# Patient Record
Sex: FEMALE | Race: WHITE | NOT HISPANIC OR LATINO | Employment: FULL TIME | URBAN - METROPOLITAN AREA
[De-identification: names, ages, dates, MRNs, and addresses within clinical notes are randomized per-mention and may not be internally consistent; named-entity substitution may affect disease eponyms.]

---

## 2017-01-17 ENCOUNTER — OFFICE VISIT (OUTPATIENT)
Dept: INTERNAL MEDICINE | Facility: CLINIC | Age: 52
End: 2017-01-17
Payer: COMMERCIAL

## 2017-01-17 ENCOUNTER — HOSPITAL ENCOUNTER (OUTPATIENT)
Dept: RADIOLOGY | Facility: HOSPITAL | Age: 52
Discharge: HOME | End: 2017-01-17
Attending: INTERNAL MEDICINE
Payer: COMMERCIAL

## 2017-01-17 ENCOUNTER — HOSPITAL ENCOUNTER (OUTPATIENT)
Dept: CARDIOLOGY | Facility: HOSPITAL | Age: 52
Discharge: HOME | End: 2017-01-17
Attending: INTERNAL MEDICINE
Payer: COMMERCIAL

## 2017-01-17 VITALS
BODY MASS INDEX: 22.76 KG/M2 | TEMPERATURE: 98 F | OXYGEN SATURATION: 97 % | DIASTOLIC BLOOD PRESSURE: 77 MMHG | WEIGHT: 123.69 LBS | HEART RATE: 79 BPM | SYSTOLIC BLOOD PRESSURE: 119 MMHG | HEIGHT: 62 IN

## 2017-01-17 DIAGNOSIS — Z72.0 TOBACCO ABUSE: ICD-10-CM

## 2017-01-17 DIAGNOSIS — G25.81 RESTLESS LEG SYNDROME: Primary | ICD-10-CM

## 2017-01-17 DIAGNOSIS — G47.00 INSOMNIA, UNSPECIFIED TYPE: ICD-10-CM

## 2017-01-17 DIAGNOSIS — Z00.00 ANNUAL PHYSICAL EXAM: ICD-10-CM

## 2017-01-17 DIAGNOSIS — R53.83 FATIGUE, UNSPECIFIED TYPE: ICD-10-CM

## 2017-01-17 PROCEDURE — 99396 PREV VISIT EST AGE 40-64: CPT | Mod: 25,S$GLB,, | Performed by: INTERNAL MEDICINE

## 2017-01-17 PROCEDURE — 99999 PR PBB SHADOW E&M-EST. PATIENT-LVL III: CPT | Mod: PBBFAC,,, | Performed by: INTERNAL MEDICINE

## 2017-01-17 PROCEDURE — 93005 ELECTROCARDIOGRAM TRACING: CPT

## 2017-01-17 PROCEDURE — 90686 IIV4 VACC NO PRSV 0.5 ML IM: CPT | Mod: S$GLB,,, | Performed by: INTERNAL MEDICINE

## 2017-01-17 PROCEDURE — 90471 IMMUNIZATION ADMIN: CPT | Mod: S$GLB,,, | Performed by: INTERNAL MEDICINE

## 2017-01-17 PROCEDURE — 71020 XR CHEST PA AND LATERAL: CPT | Mod: 26,,, | Performed by: RADIOLOGY

## 2017-01-17 PROCEDURE — 71020 XR CHEST PA AND LATERAL: CPT | Mod: TC

## 2017-01-17 PROCEDURE — 93010 ELECTROCARDIOGRAM REPORT: CPT | Mod: ,,, | Performed by: INTERNAL MEDICINE

## 2017-01-17 RX ORDER — FERROUS SULFATE 325(65) MG
325 TABLET, DELAYED RELEASE (ENTERIC COATED) ORAL
Refills: 0 | COMMUNITY
Start: 2017-01-17 | End: 2017-02-20

## 2017-01-17 RX ORDER — GABAPENTIN 300 MG/1
300 CAPSULE ORAL NIGHTLY
Qty: 30 CAPSULE | Refills: 2 | Status: SHIPPED | OUTPATIENT
Start: 2017-01-17 | End: 2017-05-10 | Stop reason: SDUPTHER

## 2017-01-17 NOTE — MR AVS SNAPSHOT
[facility] Internal Medicine  [address]  [place] [address]  Phone: [phone]  Fax: [phone]                  Carmen Gray   2017 10:20 AM   Office Visit    Description:  Female : 1965   Provider:  Carmen Phan MD   Department:  [facility] Internal Medicine           Reason for Visit     Annual Exam           Diagnoses this Visit        Comments    Restless leg syndrome    -  Primary     Annual physical exam         Insomnia, unspecified type         Fatigue, unspecified type         Tobacco abuse                To Do List           Future Appointments        Provider Department Dept Phone    2017 11:30 AM [facility] XR1 [facility] [phone]    2017 11:45 AM EKG, [facility] [phone]    2017 7:50 AM APPOINTMENT LAB, [facility] [phone]    2017 8:30 AM [facility] MAMMO1 [facility] [phone]      Goals (5 Years of Data)     None      Follow-Up and Disposition     Return in about 2 months (around 3/17/2017).       These Medications        Disp Refills Start End    gabapentin (NEURONTIN) 300 MG capsule 30 capsule 2 2017    Take 1 capsule (300 mg total) by mouth every evening. - Oral    Pharmacy: [facility] [address] [place] [address] [place] [address] [address] AT [facility] [place] Ph #: [phone]         PURCHASE these Medications (No prescription required)        Start End    ferrous sulfate 325 (65 FE) MG EC tablet 2017     Sig - Route: Take 1 tablet (325 mg total) by mouth 3 (three) times daily with meals. - Oral    Class: OTC      [facility]     [facility] Nurse Care Line -  Assistance  Registered nurses in the [facility] provide clinical advisement, health education, appointment booking, and other advisory services.  Call for this free service at [phone].             Medications     "       Message regarding Medications     Verify the changes and/or additions to your medication regime listed below are the same as discussed with your clinician today.  If any of these changes or additions are incorrect, please notify your healthcare provider.        START taking these NEW medications        Refills    gabapentin (NEURONTIN) 300 MG capsule 2    Sig: Take 1 capsule (300 mg total) by mouth every evening.    Class: Normal    Route: Oral    ferrous sulfate 325 (65 FE) MG EC tablet 0    Sig: Take 1 tablet (325 mg total) by mouth 3 (three) times daily with meals.    Class: OTC    Route: Oral      STOP taking these medications     venlafaxine (EFFEXOR-XR) 37.5 MG 24 hr capsule Take 1 capsule (37.5 mg total) by mouth once daily.           Verify that the below list of medications is an accurate representation of the medications you are currently taking.  If none reported, the list may be blank. If incorrect, please contact your healthcare provider. Carry this list with you in case of emergency.           Current Medications     ferrous sulfate 325 (65 FE) MG EC tablet Take 1 tablet (325 mg total) by mouth 3 (three) times daily with meals.    gabapentin (NEURONTIN) 300 MG capsule Take 1 capsule (300 mg total) by mouth every evening.           Clinical Reference Information           Vital Signs - Last Recorded  Most recent update: 1/17/2017 10:31 AM by Yvonne Ureña LPN    BP Pulse Temp Ht Wt SpO2    119/77 (BP Location: Left arm, Patient Position: Sitting, BP Method: Automatic) 79 98.4 °F (36.9 °C) (Oral) 5' 2" (1.575 m) 56.1 kg (123 lb 10.9 oz) 97%    BMI                22.62 kg/m2          Blood Pressure          Most Recent Value    BP  119/77      Allergies as of 1/17/2017     No Known Allergies      Immunizations Administered on Date of Encounter - 1/17/2017     Name Date Dose VIS Date Route    influenza - Quadrivalent - PF (ADULT)  Incomplete 0.5 mL 8/7/2015 Intramuscular      Orders Placed During " Today's Visit      Normal Orders This Visit    Case request GI: COLONOSCOPY     Influenza - Quadrivalent (3 years & older) (PF)     Future Labs/Procedures Expected by Expires    CBC auto differential  1/17/2017 3/18/2018    Comprehensive metabolic panel  1/17/2017 3/18/2018    Lipid panel  1/17/2017 1/17/2018    Mammo Digital Screening Bilat with CAD  1/17/2017 3/20/2018    T4, free  1/17/2017 3/18/2018    TSH  1/17/2017 3/18/2018    X-Ray Chest PA And Lateral  1/17/2017 1/17/2018    EKG 12-lead  As directed 1/17/2018    Hemoglobin A1c  As directed 1/17/2018      [facility] Sign-Up     Activating your [facility] account is as easy as 1-2-3!     1) Visit [email or web address], select Sign Up Now, enter this activation code and your date of birth, then select Next.  [email or web address]  Expires: 3/3/2017 11:19 AM      2) Create a username and password to use when you visit [facility] in the future and select a security question in case you lose your password and select Next.    3) Enter your e-mail address and click Sign Up!    Additional Information  If you have questions, please e-mail [email or web address] or call [phone] to talk to our [facility] staff. Remember, [facility] is NOT to be used for urgent needs. For medical emergencies, dial 911.         Smoking Cessation     If you would like to quit smoking:   You may be eligible for free services if you are a [place] resident and started smoking cigarettes before September 1, 1988.  Call the [employer]oking [employer]essation [employer] (SCT) toll free at (816) 237-5130 or (740) 650-9801.   Call 9-913-QUIT-NOW if you do not meet the above criteria.

## 2017-01-17 NOTE — PROGRESS NOTES
"Portions of this note are generated with voice recognition software. Typographical errors may exist.     Subjective:      Patient ID: Carmen Gray is a 51 y.o. female.    Chief Complaint: Annual Exam    HPI: Patient is a 51-year-old lady here to establish care with me.  She is here for her annual physical examination.  She is just about recovered from her upper respiratory tract illness.  She denies any symptoms of cough or fever or chills at present.  Patient complains of fatigue which has been going on for the past several months.  She also has insomnia.  The insomnia is secondary to restless leg that she has been at night.  The patient says that she has cramping pain and the sensations that are relieved by constant moving movement of her legs.  This disturbs her sleep and also her partner's sleep.  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  Patient is postmenopausal and has had normal Pap smear and is due for her Pap smear in 2 years.  She is status post gastric sleeve surgery for obesity and she's lost about 60 pounds over 2 years.  She denies any history of nausea vomiting or diarrhea.  She has to take multiple small meals 0.2 early satiety.  She is on multivitamin supplements but she is not regular with the intake.  She is status post cholecystectomy and hiatal hernia repair in June 2016 at [facility].  She is a smoker and smokes about half a pack of cigarettes per day for the past 20+ years.  She expresses her intention to quit smoking.  She is anxious about malignancy.  She has occasional alcohol intake.  Patient claims that her mood is mostly ".  She has had depression in the past treated with Effexor at the time of a difficult divorce.  She has no longer taking the medication and denies any complaints of low mood.  She has adequate interest in her day-to-day activities.  She has normal appetite.  Family history is significant for " coronary artery disease in her father who  at the age of 56 from an MI.  History of coronary artery disease in a brother at the age of 65, renal cancer in her brother.    Immunization : She is received DTaP in the last 2 years.  She intends to receive a flu vaccination today.  Screening Tests: Last mammogram was about 5 years back.  She has never had a colonoscopy.      Eye/dental she is due for an eye checkup.  She is myopic and uses glasses for the same.:  The patient denies any chest pain or shortness of breath.  No history of hematemesis or melena.  No history of sore throat or swollen glands.  No history of seizures or syncope.  No history of seizure in any extremity or loss of strength in any extremity.  No history of nausea or vomiting.  No history of recent changes in appetite.  The patient has a history of intended weight loss through gastric sleeve sleeve surgery.  No history of vertigo or ear pain.  No history of acute blurring of vision or diplopia.    Review of patient's allergies indicates:  No Known Allergies  Past Medical History   Diagnosis Date    Anxiety     Depression     Insomnia      Past Surgical History   Procedure Laterality Date    Tubal ligation      Gastric sleeve  2015     Family History   Problem Relation Age of Onset    Hypertension Mother     Hypertension Father     Hypertension Brother     Thyroid disease       Social History     Social History    Marital status:      Spouse name: N/A    Number of children: N/A    Years of education: N/A     Occupational History    Not on file.     Social History Main Topics    Smoking status: Current Every Day Smoker    Smokeless tobacco: Never Used    Alcohol use Yes      Comment: social (history of heavy drinking around the time of her divorce)    Drug use: No    Sexual activity: Not on file     Other Topics Concern    Not on file     Social History Narrative         Review of SystemsAs above   Objective:      Physical Exam   Constitutional: She is oriented to person, place, and time. She appears well-developed and well-nourished. No distress.   HENT:   Head: Normocephalic and atraumatic.   Right Ear: External ear normal.   Left Ear: External ear normal.   Nose: Nose normal.   Mouth/Throat: Oropharynx is clear and moist. No oropharyngeal exudate.   Eyes: EOM are normal. Pupils are equal, round, and reactive to light. Right eye exhibits no discharge. Left eye exhibits no discharge.   Neck: Normal range of motion. Neck supple. No JVD present. No tracheal deviation present. No thyromegaly present.   Cardiovascular: Normal rate and regular rhythm.  Exam reveals no gallop and no friction rub.    No murmur heard.  Pulmonary/Chest: Breath sounds normal. No respiratory distress. She has no wheezes. She has no rales.   Abdominal: Soft. Bowel sounds are normal. She exhibits no distension and no mass. There is no tenderness.   Musculoskeletal: She exhibits no edema or deformity.   Neurological: She is alert and oriented to person, place, and time. She has normal reflexes. She displays normal reflexes. No cranial nerve deficit.   Skin: Skin is warm and dry. She is not diaphoretic.     Assessment:       Labs:  No results found for this or any previous visit (from the past 1008 hour(s)).  1. Restless leg syndrome    2. Annual physical exam    3. Insomnia, unspecified type    4. Fatigue, unspecified type    5. Tobacco abuse      Taught her stretching exercises for restless leg syndrome.  Recommended that she do by cycling and regular walking.  Avoidance of aggravating activities.  Avoidance of caffeine, smoking cessation.  Warm baths before sleeping.  Started her on gabapentin 300 mg daily at bedtime to be advanced to 2 tablets daily at bedtime.  Watch for dizziness/drowsiness.  Also start her on ferrous sulfate  Insomnia secondary to restless leg syndrome.  Will address restless 6 syndrome if required we'll start her on clonazepam  0.1 mg next visit.    Fatigue Will check her lab is.  She had mild anemia with previous years and probably secondary to her surgery.  We'll check her CBC CMP TSH to further evaluate.  We'll start her on iron replacement.  Recommended that she take a multivitamin.     tobacco abuse: The patient said she would call the smoking cessation clinic to help quit smoking.  We'll order a chest x-ray  Annual physical examination: Ordered the following labs.  Will get the mammogram, colonoscopy for screening for cancers.  Will administer flu shot  Follow-up in 2 months or earlier if required    Orders Placed This Encounter   Procedures    Mammo Digital Screening Bilat with CAD    X-Ray Chest PA And Lateral    Influenza - Quadrivalent (3 years & older) (PF)    CBC auto differential    Comprehensive metabolic panel    Lipid panel    Hemoglobin A1c    TSH    T4, free    EKG 12-lead     50 minutes spent during this visit of which greater than 50% devoted to face-face counseling and coordination of care regarding diagnosis and management plan

## 2017-01-21 ENCOUNTER — TELEPHONE (OUTPATIENT)
Dept: GASTROENTEROLOGY | Facility: CLINIC | Age: 52
End: 2017-01-21

## 2017-01-21 DIAGNOSIS — Z12.11 SPECIAL SCREENING FOR MALIGNANT NEOPLASMS, COLON: Primary | ICD-10-CM

## 2017-01-21 NOTE — TELEPHONE ENCOUNTER
Colonoscopy Referral    Referring Physician: Dr. Phan  Date: 1/21/2017    Reason for Referral: screening    Family History of:   Colon polyp: No  Relationship/Age of Onset:     Colon cancer: No  Relationship/Age of Onset:     Patient with:   Hemoccults Done: No  Iron deficient: No  On Blood Thinner: No  Valvular heart disease/valve replacement: No  Anemia Present: No  On NSAID: No  Lung disease: No  Kidney disease: No  Hx of polyps: No  Hx of colon cancer: No    Previous colon evalations: No   None  When:   Where:   Pertinent symptoms:     Current Outpatient Prescriptions   Medication Sig Dispense Refill    ferrous sulfate 325 (65 FE) MG EC tablet Take 1 tablet (325 mg total) by mouth 3 (three) times daily with meals.  0    gabapentin (NEURONTIN) 300 MG capsule Take 1 capsule (300 mg total) by mouth every evening. 30 capsule 2     No current facility-administered medications for this visit.        Review of patient's allergies indicates:  No Known Allergies    Patient was scheduled for colonoscopy on 1/31/2017 with Dr. Mar at [facility]. Split dose instructions were reviewed with patient.

## 2017-01-24 ENCOUNTER — TELEPHONE (OUTPATIENT)
Dept: GASTROENTEROLOGY | Facility: CLINIC | Age: 52
End: 2017-01-24

## 2017-01-24 ENCOUNTER — HOSPITAL ENCOUNTER (OUTPATIENT)
Dept: RADIOLOGY | Facility: HOSPITAL | Age: 52
Discharge: HOME | End: 2017-01-24
Attending: INTERNAL MEDICINE
Payer: COMMERCIAL

## 2017-01-24 ENCOUNTER — TELEPHONE (OUTPATIENT)
Dept: FAMILY MEDICINE | Facility: CLINIC | Age: 52
End: 2017-01-24

## 2017-01-24 DIAGNOSIS — Z00.00 ANNUAL PHYSICAL EXAM: ICD-10-CM

## 2017-01-24 PROCEDURE — 77067 SCR MAMMO BI INCL CAD: CPT | Mod: 26,,, | Performed by: RADIOLOGY

## 2017-01-24 PROCEDURE — 77067 SCR MAMMO BI INCL CAD: CPT | Mod: TC

## 2017-01-24 NOTE — TELEPHONE ENCOUNTER
----- Message from Cheyanne Lemus sent at 1/24/2017  9:16 AM CST -----  Contact: Self [phone]  Patient is calling to reschedule her colonoscopy. Please advice

## 2017-01-24 NOTE — TELEPHONE ENCOUNTER
Pt rescheduled Colonoscopy for Tuesday 2/7/17 at [facility] with Dr Mar. Pt will change dates on instructions accordingly.

## 2017-02-06 ENCOUNTER — TELEPHONE (OUTPATIENT)
Dept: GASTROENTEROLOGY | Facility: CLINIC | Age: 52
End: 2017-02-06

## 2017-02-06 NOTE — TELEPHONE ENCOUNTER
----- Message from Tiki Urbano sent at 2/6/2017  8:46 AM CST -----  Contact: [phone]/self  Pt would like to speak with the nurse about upcoming procedure.   Please advise

## 2017-02-07 ENCOUNTER — ANESTHESIA (OUTPATIENT)
Dept: ENDOSCOPY | Facility: HOSPITAL | Age: 52
End: 2017-02-07
Payer: COMMERCIAL

## 2017-02-07 ENCOUNTER — ANESTHESIA EVENT (OUTPATIENT)
Dept: ENDOSCOPY | Facility: HOSPITAL | Age: 52
End: 2017-02-07
Payer: COMMERCIAL

## 2017-02-07 ENCOUNTER — SURGERY (OUTPATIENT)
Age: 52
End: 2017-02-07

## 2017-02-07 ENCOUNTER — HOSPITAL ENCOUNTER (OUTPATIENT)
Facility: HOSPITAL | Age: 52
Discharge: HOME | End: 2017-02-07
Attending: INTERNAL MEDICINE | Admitting: INTERNAL MEDICINE
Payer: COMMERCIAL

## 2017-02-07 DIAGNOSIS — Z12.11 SCREEN FOR COLON CANCER: Primary | ICD-10-CM

## 2017-02-07 PROCEDURE — 45385 COLONOSCOPY W/LESION REMOVAL: CPT | Mod: 33,,, | Performed by: INTERNAL MEDICINE

## 2017-02-07 PROCEDURE — 25000003 PHARM REV CODE 250: Performed by: INTERNAL MEDICINE

## 2017-02-07 PROCEDURE — 27201089 HC SNARE, DISP (ANY): Performed by: INTERNAL MEDICINE

## 2017-02-07 PROCEDURE — 45390 COLONOSCOPY W/RESECTION: CPT | Performed by: INTERNAL MEDICINE

## 2017-02-07 PROCEDURE — 63600175 PHARM REV CODE 636 W HCPCS: Performed by: NURSE ANESTHETIST, CERTIFIED REGISTERED

## 2017-02-07 PROCEDURE — 25000003 PHARM REV CODE 250: Performed by: NURSE ANESTHETIST, CERTIFIED REGISTERED

## 2017-02-07 PROCEDURE — 88305 TISSUE EXAM BY PATHOLOGIST: CPT | Performed by: PATHOLOGY

## 2017-02-07 PROCEDURE — 88341 IMHCHEM/IMCYTCHM EA ADD ANTB: CPT | Performed by: PATHOLOGY

## 2017-02-07 PROCEDURE — 45381 COLONOSCOPY SUBMUCOUS NJX: CPT | Mod: ,,, | Performed by: INTERNAL MEDICINE

## 2017-02-07 PROCEDURE — 27201028 HC NEEDLE, SCLERO: Performed by: INTERNAL MEDICINE

## 2017-02-07 PROCEDURE — 37000008 HC ANESTHESIA 1ST 15 MINUTES: Performed by: INTERNAL MEDICINE

## 2017-02-07 PROCEDURE — 45385 COLONOSCOPY W/LESION REMOVAL: CPT | Performed by: INTERNAL MEDICINE

## 2017-02-07 PROCEDURE — 88305 TISSUE EXAM BY PATHOLOGIST: CPT | Mod: 26,,, | Performed by: PATHOLOGY

## 2017-02-07 PROCEDURE — 37000009 HC ANESTHESIA EA ADD 15 MINS: Performed by: INTERNAL MEDICINE

## 2017-02-07 RX ORDER — LIDOCAINE HCL/PF 100 MG/5ML
SYRINGE (ML) INTRAVENOUS
Status: DISCONTINUED | OUTPATIENT
Start: 2017-02-07 | End: 2017-02-07

## 2017-02-07 RX ORDER — PROPOFOL 10 MG/ML
VIAL (ML) INTRAVENOUS
Status: DISCONTINUED | OUTPATIENT
Start: 2017-02-07 | End: 2017-02-07

## 2017-02-07 RX ORDER — SODIUM CHLORIDE 9 MG/ML
INJECTION, SOLUTION INTRAVENOUS CONTINUOUS
Status: DISCONTINUED | OUTPATIENT
Start: 2017-02-07 | End: 2017-02-07 | Stop reason: HOSPADM

## 2017-02-07 RX ORDER — PROPOFOL 10 MG/ML
VIAL (ML) INTRAVENOUS CONTINUOUS PRN
Status: DISCONTINUED | OUTPATIENT
Start: 2017-02-07 | End: 2017-02-07

## 2017-02-07 RX ADMIN — PROPOFOL 130 MCG/KG/MIN: 10 INJECTION, EMULSION INTRAVENOUS at 11:02

## 2017-02-07 RX ADMIN — PROPOFOL 100 MG: 10 INJECTION, EMULSION INTRAVENOUS at 11:02

## 2017-02-07 RX ADMIN — LIDOCAINE HYDROCHLORIDE 75 MG: 20 INJECTION, SOLUTION INTRAVENOUS at 11:02

## 2017-02-07 RX ADMIN — SODIUM CHLORIDE: 0.9 INJECTION, SOLUTION INTRAVENOUS at 11:02

## 2017-02-07 RX ADMIN — SODIUM CHLORIDE: 0.9 INJECTION, SOLUTION INTRAVENOUS at 09:02

## 2017-02-07 NOTE — ANESTHESIA POSTPROCEDURE EVALUATION
"Anesthesia Post Evaluation    Patient: Carmen Gray    Procedure(s) Performed: Procedure(s) (LRB):  COLONOSCOPY  split (N/A)    Final Anesthesia Type: MAC  Patient location during evaluation: GI PACU  Patient participation: Yes- Able to Participate  Level of consciousness: awake and alert and oriented  Post-procedure vital signs: reviewed and stable  Pain management: adequate  Airway patency: patent  PONV status at discharge: No PONV  Anesthetic complications: no      Cardiovascular status: blood pressure returned to baseline, hemodynamically stable and stable  Respiratory status: room air, unassisted and spontaneous ventilation  Hydration status: euvolemic  Follow-up not needed.        Visit Vitals    /69 (BP Location: Right arm, Patient Position: Lying, BP Method: Automatic)    Pulse (!) 55    Temp 36.8 °C (98.2 °F) (Oral)    Resp 17    Ht 5' 2" (1.575 m)    Wt 55.8 kg (123 lb)    SpO2 100%    Breastfeeding No    BMI 22.5 kg/m2       Pain/Rafal Score: Pain Assessment Performed: Yes (2/7/2017  9:30 AM)  Presence of Pain: denies (2/7/2017  9:30 AM)      "

## 2017-02-07 NOTE — INTERVAL H&P NOTE
The patient has been examined and the H&P has been reviewed:    I concur with the findings and changes have been noted since the H&P was written: NO prior colonoscopy; NO FH colon polyps / cancer; Screening    Anesthesia/Surgery risks, benefits and alternative options discussed and understood by patient/family.          Active Hospital Problems    Diagnosis  POA    Screen for colon cancer [Z12.11]  Not Applicable      Resolved Hospital Problems    Diagnosis Date Resolved POA   No resolved problems to display.

## 2017-02-07 NOTE — IP AVS SNAPSHOT
[facility]  [address]  [place] [address]  Phone: [phone]           Patient Discharge Instructions     Our goal is to set you up for success. This packet includes information on your condition, medications, and your home care. It will help you to care for yourself so you don't get sicker and need to go back to the hospital.     Please ask your nurse if you have any questions.        There are many details to remember when preparing to leave the hospital. Here is what you will need to do:    1. Take your medicine. If you are prescribed medications, review your Medication List in the following pages. You may have new medications to  at the pharmacy and others that you'll need to stop taking. Review the instructions for how and when to take your medications. Talk with your doctor or nurses if you are unsure of what to do.     2. Go to your follow-up appointments. Specific follow-up information is listed in the following pages. Your may be contacted by a transition nurse or clinical provider about future appointments. Be sure we have all of the phone numbers to reach you, if needed. Please contact your provider's office if you are unable to make an appointment.     3. Watch for warning signs. Your doctor or nurse will give you detailed warning signs to watch for and when to call for assistance. These instructions may also include educational information about your condition. If you experience any of warning signs to your health, call your doctor.               ** Verify the list of medication(s) below is accurate and up to date. Carry this with you in case of emergency. If your medications have changed, please notify your healthcare provider.             Medication List      CONTINUE taking these medications        Additional Info                      gabapentin 300 MG capsule   Commonly known as:  NEURONTIN   Quantity:  30 capsule   Refills:  2   Dose:  300 mg    Instructions:  Take  1 capsule (300 mg total) by mouth every evening.     Begin Date    AM    Noon    PM    Bedtime         ASK your doctor about these medications        Additional Info                      ferrous sulfate 325 (65 FE) MG EC tablet   Refills:  0   Dose:  325 mg    Instructions:  Take 1 tablet (325 mg total) by mouth 3 (three) times daily with meals.     Begin Date    AM    Noon    PM    Bedtime                  Please bring to all follow up appointments:    1. A copy of your discharge instructions.  2. All medicines you are currently taking in their original bottles.  3. Identification and insurance card.    Please arrive 15 minutes ahead of scheduled appointment time.    Please call 24 hours in advance if you must reschedule your appointment and/or time.        Follow-up Information     Follow up with Carmen Phan MD.    Specialty:  Internal Medicine    Why:  As needed    Contact information:    [address]  [address]  [place] [address]  [phone]          Follow up with Dallas Mar MD.    Specialty:  Gastroenterology    Why:  Office will call with biopsy / pathology report, As needed    Contact information:    [address]  [address]  [place] [id number] [phone]          Discharge Instructions     Future Orders    Diet general     Questions:    Total calories:      Fat restriction, if any:      Protein restriction, if any:      Na restriction, if any:      Fluid restriction:      Additional restrictions:          Discharge Instructions       Discharge Summary/Instructions for after Colonoscopy with Biopsy/Polypectomy    Carmen Marina  2/7/2017  Dallas Mar MD    Restrictions on Activity:    - Do not drive car or operate machinery until the day after the procedure.  - The following day: return to full activity including work.  - For 3 days: No heavy lifting, straining or running.  - Diet: Eat and drink normally unless instructed otherwise.    Treatment for Common Side  "Effects:  - Mild abdominal pain and bloating or excessive gas: rest, eat lightly and use a heating pad.     Symptoms to watch for and report to your physician:  1. Severe abdominal pain.  2. Fever within 24 hours after a procedure.  3. A large amount of rectal bleeding. (A small amount of blood from the rectum is not serious, especially if hemorrhoids are present.)  4. Because air was put into your colon during the procedure, expelling large amount of air from your rectum is normal.  5. You may not have a bowel movement for 1-3 days because of the colonoscopy prep. This is normal.  6. Do not take any products containing aspirin for 10 days.  7. Go directly to the emergency room if you notice any of the following:     Chills and/or fever over 101   Persistent vomiting   Severe abdominal pain, other than gas cramps   Severe chest pain   Black, tarry stools   Any bleeding - exceeding one tablespoon    If you have any questions or problems, please call your Physician:    Dallas Mar MD      Lab Results: Contact Physician's Office      If a complication or emergency situation arises and you are unable to reach your Physician - GO TO THE EMERGENCY ROOM.          Primary Diagnosis     Your primary diagnosis was:  Screen For Colon Cancer      Admission Information     Date & Time Provider Department CSN    2/7/2017  8:27 AM Dallas Mar MD [facility] [id number]      Care Providers     Provider Role Specialty Primary office phone    Dallas Mar MD Attending Provider Gastroenterology [phone]    Dallas Mar MD Surgeon  Gastroenterology [phone]      Your Vitals Were     BP Pulse Temp Resp Height Weight    129/83 58 98.2 °F (36.8 °C) (Oral) 18 5' 2" (1.575 m) 55.8 kg (123 lb)    SpO2 BMI             100% 22.5 kg/m2         Recent Lab Values        1/5/2015 1/24/2017                        1:55 PM  7:51 AM          A1C 5.4 5.3          " Comment for A1C at  7:51 AM on 1/24/2017:  According to ADA guidelines, hemoglobin A1C <7.0% represents  optimal control in non-pregnant diabetic patients.  Different  metrics may apply to specific populations.   Standards of Medical Care in Diabetes - 2016.  For the purpose of screening for the presence of diabetes:  <5.7%     Consistent with the absence of diabetes  5.7-6.4%  Consistent with increasing risk for diabetes   (prediabetes)  >or=6.5%  Consistent with diabetes  Currently no consensus exists for use of hemoglobin A1C  for diagnosis of diabetes for children.        Pending Labs     Order Current Status    Specimen to Pathology - Surgery Collected (02/07/17 1207)      Allergies as of 2/7/2017     No Known Allergies      [facility]     [facility] Nurse Care Line - 24/7 Assistance  Unless otherwise directed by your provider, please contact [facility], our nurse care line that is available for 24/7 assistance.     Registered nurses in the [facility] provide clinical advisement, health education, appointment booking, and other advisory services.  Call for this free service at [phone].        Advance Directives     An advance directive is a document which, in the event you are no longer able to make decisions for yourself, tells your healthcare team what kind of treatment you do or do not want to receive, or who you would like to make those decisions for you.  If you do not currently have an advance directive, [facility] encourages you to create one.  For more information call:  [phone]), [phone] ([phone]),  or log on to [email or web address].        Smoking Cessation     If you would like to quit smoking:   You may be eligible for free services if you are a [place] resident and started smoking cigarettes before September 1, 1988.  Call the Smoking Cessation Trust (SCT) toll free at (579) 701-1180 or (681) 424-7720.   Call 3-947-QUIT-NOW if you  do not meet the above criteria.            Language Assistance Services     ATTENTION: Language assistance services are available, free of charge. Please call [phone].      ATENCIÓN: Si raz theodore, tiene a chin disposición servicios gratuitos de asistencia lingüística. [place] al [phone].     CHÚ Ý: N?u b?n nói Ti?ng Vi?t, có các d?ch v? h? tr? ngôn ng? mi?n phí dành cho b?n. G?i s? [phone].        [facility] Sign-Up     Activating your [facility] account is as easy as 1-2-3!     1) Visit [email or web address], select Sign Up Now, enter this activation code and your date of birth, then select Next.  [email or web address]  Expires: 3/3/2017 11:19 AM      2) Create a username and password to use when you visit [facility] in the future and select a security question in case you lose your password and select Next.    3) Enter your e-mail address and click Sign Up!    Additional Information  If you have questions, please e-mail [email or web address] or call [phone] to talk to our [facility] staff. Remember, [facility] is NOT to be used for urgent needs. For medical emergencies, dial 911.          [facility] complies with applicable Federal civil rights laws and does not discriminate on the basis of race, color, national origin, age, disability, or sex.

## 2017-02-07 NOTE — H&P (VIEW-ONLY)
"Portions of this note are generated with voice recognition software. Typographical errors may exist.     Subjective:      Patient ID: Carmen Gray is a 51 y.o. female.    Chief Complaint: Annual Exam    HPI: Patient is a 51-year-old lady here to establish care with me.  She is here for her annual physical examination.  She is just about recovered from her upper respiratory tract illness.  She denies any symptoms of cough or fever or chills at present.  Patient complains of fatigue which has been going on for the past several months.  She also has insomnia.  The insomnia is secondary to restless leg that she has been at night.  The patient says that she has cramping pain and the sensations that are relieved by constant moving movement of her legs.  This disturbs her sleep and also her partner's sleep.  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  Patient is postmenopausal and has had normal Pap smear and is due for her Pap smear in 2 years.  She is status post gastric sleeve surgery for obesity and she's lost about 60 pounds over 2 years.  She denies any history of nausea vomiting or diarrhea.  She has to take multiple small meals 0.2 early satiety.  She is on multivitamin supplements but she is not regular with the intake.  She is status post cholecystectomy and hiatal hernia repair in June 2016 at [facility].  She is a smoker and smokes about half a pack of cigarettes per day for the past 20+ years.  She expresses her intention to quit smoking.  She is anxious about malignancy.  She has occasional alcohol intake.  Patient claims that her mood is mostly ".  She has had depression in the past treated with Effexor at the time of a difficult divorce.  She has no longer taking the medication and denies any complaints of low mood.  She has adequate interest in her day-to-day activities.  She has normal appetite.  Family history is significant for " coronary artery disease in her father who  at the age of 56 from an MI.  History of coronary artery disease in a brother at the age of 65, renal cancer in her brother.    Immunization : She is received DTaP in the last 2 years.  She intends to receive a flu vaccination today.  Screening Tests: Last mammogram was about 5 years back.  She has never had a colonoscopy.      Eye/dental she is due for an eye checkup.  She is myopic and uses glasses for the same.:  The patient denies any chest pain or shortness of breath.  No history of hematemesis or melena.  No history of sore throat or swollen glands.  No history of seizures or syncope.  No history of seizure in any extremity or loss of strength in any extremity.  No history of nausea or vomiting.  No history of recent changes in appetite.  The patient has a history of intended weight loss through gastric sleeve sleeve surgery.  No history of vertigo or ear pain.  No history of acute blurring of vision or diplopia.    Review of patient's allergies indicates:  No Known Allergies  Past Medical History   Diagnosis Date    Anxiety     Depression     Insomnia      Past Surgical History   Procedure Laterality Date    Tubal ligation      Gastric sleeve  2015     Family History   Problem Relation Age of Onset    Hypertension Mother     Hypertension Father     Hypertension Brother     Thyroid disease       Social History     Social History    Marital status:      Spouse name: N/A    Number of children: N/A    Years of education: N/A     Occupational History    Not on file.     Social History Main Topics    Smoking status: Current Every Day Smoker    Smokeless tobacco: Never Used    Alcohol use Yes      Comment: social (history of heavy drinking around the time of her divorce)    Drug use: No    Sexual activity: Not on file     Other Topics Concern    Not on file     Social History Narrative         Review of SystemsAs above   Objective:      Physical Exam   Constitutional: She is oriented to person, place, and time. She appears well-developed and well-nourished. No distress.   HENT:   Head: Normocephalic and atraumatic.   Right Ear: External ear normal.   Left Ear: External ear normal.   Nose: Nose normal.   Mouth/Throat: Oropharynx is clear and moist. No oropharyngeal exudate.   Eyes: EOM are normal. Pupils are equal, round, and reactive to light. Right eye exhibits no discharge. Left eye exhibits no discharge.   Neck: Normal range of motion. Neck supple. No JVD present. No tracheal deviation present. No thyromegaly present.   Cardiovascular: Normal rate and regular rhythm.  Exam reveals no gallop and no friction rub.    No murmur heard.  Pulmonary/Chest: Breath sounds normal. No respiratory distress. She has no wheezes. She has no rales.   Abdominal: Soft. Bowel sounds are normal. She exhibits no distension and no mass. There is no tenderness.   Musculoskeletal: She exhibits no edema or deformity.   Neurological: She is alert and oriented to person, place, and time. She has normal reflexes. She displays normal reflexes. No cranial nerve deficit.   Skin: Skin is warm and dry. She is not diaphoretic.     Assessment:       Labs:  No results found for this or any previous visit (from the past 1008 hour(s)).  1. Restless leg syndrome    2. Annual physical exam    3. Insomnia, unspecified type    4. Fatigue, unspecified type    5. Tobacco abuse      Taught her stretching exercises for restless leg syndrome.  Recommended that she do by cycling and regular walking.  Avoidance of aggravating activities.  Avoidance of caffeine, smoking cessation.  Warm baths before sleeping.  Started her on gabapentin 300 mg daily at bedtime to be advanced to 2 tablets daily at bedtime.  Watch for dizziness/drowsiness.  Also start her on ferrous sulfate  Insomnia secondary to restless leg syndrome.  Will address restless 6 syndrome if required we'll start her on clonazepam  0.1 mg next visit.    Fatigue Will check her lab is.  She had mild anemia with previous years and probably secondary to her surgery.  We'll check her CBC CMP TSH to further evaluate.  We'll start her on iron replacement.  Recommended that she take a multivitamin.     tobacco abuse: The patient said she would call the smoking cessation clinic to help quit smoking.  We'll order a chest x-ray  Annual physical examination: Ordered the following labs.  Will get the mammogram, colonoscopy for screening for cancers.  Will administer flu shot  Follow-up in 2 months or earlier if required    Orders Placed This Encounter   Procedures    Mammo Digital Screening Bilat with CAD    X-Ray Chest PA And Lateral    Influenza - Quadrivalent (3 years & older) (PF)    CBC auto differential    Comprehensive metabolic panel    Lipid panel    Hemoglobin A1c    TSH    T4, free    EKG 12-lead     50 minutes spent during this visit of which greater than 50% devoted to face-face counseling and coordination of care regarding diagnosis and management plan

## 2017-02-07 NOTE — ANESTHESIA PREPROCEDURE EVALUATION
02/07/2017  Carmen Gray is a 51 y.o., female.    [facility] Anesthesia Evaluation     I have reviewed the Nursing Notes.   I have reviewed the Medications.     Review of Systems  Anesthesia Hx:  No problems with previous Anesthesia Denies Hx of Anesthetic complications Denies Family Hx of Anesthesia complications.    Social:  Non-Smoker, No Alcohol Use    Hematology/Oncology:  Hematology Normal   Oncology Normal     EENT/Dental:EENT/Dental Normal   Cardiovascular:  Cardiovascular Normal Exercise tolerance: good   Functional Capacity good / => 4 METS    Pulmonary:  Pulmonary Normal    Renal/:  Renal/ Normal     Hepatic/GI:  Hepatic/GI Normal    Musculoskeletal:  Musculoskeletal Normal    Neurological:  Neurology Normal    Endocrine:  Endocrine Normal    Psych:   Psychiatric History          Physical Exam  General:  Well nourished    Airway/Jaw/Neck:  Airway Findings: Mouth Opening: Normal Tongue: Normal  General Airway Assessment: Adult  Mallampati: II  TM Distance: Normal, at least 6 cm  Jaw/Neck Findings:     Neck ROM: Normal ROM      Dental:  Dental Findings: In tact   Chest/Lungs:  Chest/Lungs Findings: Clear to auscultation     Heart/Vascular:  Heart Findings: Rate: Normal  Rhythm: Regular Rhythm        Mental Status:  Mental Status Findings:  Cooperative, Alert and Oriented         Anesthesia Plan  Type of Anesthesia, risks & benefits discussed:  Anesthesia Type:  MAC  Patient's Preference: MAC  Intra-op Monitoring Plan:   Intra-op Monitoring Plan Comments:   Post Op Pain Control Plan:   Post Op Pain Control Plan Comments:   Induction:   IV  Beta Blocker:         Informed Consent: Patient understands risks and agrees with Anesthesia plan.  Questions answered. Anesthesia consent signed with patient.  ASA Score: 2     Day of Surgery Review of History & Physical:            Ready For Surgery From Anesthesia  Perspective.

## 2017-02-07 NOTE — TRANSFER OF CARE
"Anesthesia Transfer of Care Note    Patient: Carmen Gray    Procedure(s) Performed: Procedure(s) (LRB):  COLONOSCOPY  split (N/A)    Patient location: GI    Anesthesia Type: MAC    Transport from OR: Transported from OR on room air with adequate spontaneous ventilation    Post pain: adequate analgesia    Post assessment: no apparent anesthetic complications    Post vital signs: stable    Level of consciousness: awake, alert and oriented    Nausea/Vomiting: no nausea/vomiting    Complications: none          Last vitals:   Visit Vitals    /69 (BP Location: Right arm, Patient Position: Lying, BP Method: Automatic)    Pulse (!) 55    Temp 36.8 °C (98.2 °F) (Oral)    Resp 17    Ht 5' 2" (1.575 m)    Wt 55.8 kg (123 lb)    SpO2 100%    Breastfeeding No    BMI 22.5 kg/m2     "

## 2017-02-09 ENCOUNTER — TELEPHONE (OUTPATIENT)
Dept: FAMILY MEDICINE | Facility: CLINIC | Age: 52
End: 2017-02-09

## 2017-02-09 VITALS
WEIGHT: 123 LBS | RESPIRATION RATE: 12 BRPM | DIASTOLIC BLOOD PRESSURE: 79 MMHG | BODY MASS INDEX: 22.63 KG/M2 | OXYGEN SATURATION: 100 % | HEART RATE: 56 BPM | SYSTOLIC BLOOD PRESSURE: 129 MMHG | HEIGHT: 62 IN | TEMPERATURE: 98 F

## 2017-02-09 NOTE — TELEPHONE ENCOUNTER
----- Message from Cheyanne Lemus sent at 2/9/2017  8:52 AM CST -----  Contact: Self [phone]  Patient Returning Your Phone Call

## 2017-02-15 ENCOUNTER — TELEPHONE (OUTPATIENT)
Dept: FAMILY MEDICINE | Facility: CLINIC | Age: 52
End: 2017-02-15

## 2017-02-15 DIAGNOSIS — C80.1 ADENOCARCINOMA IN A POLYP: Primary | ICD-10-CM

## 2017-02-15 NOTE — TELEPHONE ENCOUNTER
Spoke with patient and confirmed that she received Dr. Phan's message and will stop by the office tomorrow at about 12:30. Lab scheduled and I informed the patient that someone will call her to schedule the CT once authorization is received, patient verbalized understanding.

## 2017-02-15 NOTE — TELEPHONE ENCOUNTER
----- Message from Abby Torres sent at 2/15/2017  1:33 PM CST -----  Contact: self/[phone]  Patient is returning your call.  Please advise

## 2017-02-16 ENCOUNTER — LAB VISIT (OUTPATIENT)
Dept: LAB | Facility: HOSPITAL | Age: 52
End: 2017-02-16
Attending: INTERNAL MEDICINE
Payer: COMMERCIAL

## 2017-02-16 ENCOUNTER — PATIENT MESSAGE (OUTPATIENT)
Dept: INTERNAL MEDICINE | Facility: CLINIC | Age: 52
End: 2017-02-16

## 2017-02-16 DIAGNOSIS — C80.1 ADENOCARCINOMA IN A POLYP: ICD-10-CM

## 2017-02-16 LAB — CEA SERPL-MCNC: 2.4 NG/ML

## 2017-02-16 PROCEDURE — 36415 COLL VENOUS BLD VENIPUNCTURE: CPT

## 2017-02-16 PROCEDURE — 82378 CARCINOEMBRYONIC ANTIGEN: CPT

## 2017-02-17 ENCOUNTER — TELEPHONE (OUTPATIENT)
Dept: FAMILY MEDICINE | Facility: CLINIC | Age: 52
End: 2017-02-17

## 2017-02-20 ENCOUNTER — OFFICE VISIT (OUTPATIENT)
Dept: SURGERY | Facility: CLINIC | Age: 52
End: 2017-02-20
Payer: COMMERCIAL

## 2017-02-20 VITALS
HEART RATE: 54 BPM | DIASTOLIC BLOOD PRESSURE: 73 MMHG | HEIGHT: 62 IN | SYSTOLIC BLOOD PRESSURE: 111 MMHG | WEIGHT: 126.31 LBS | BODY MASS INDEX: 23.24 KG/M2

## 2017-02-20 DIAGNOSIS — C19 CANCER OF RECTOSIGMOID (COLON): Primary | ICD-10-CM

## 2017-02-20 PROCEDURE — 99999 PR PBB SHADOW E&M-EST. PATIENT-LVL III: CPT | Mod: PBBFAC,,, | Performed by: COLON & RECTAL SURGERY

## 2017-02-20 PROCEDURE — 1160F RVW MEDS BY RX/DR IN RCRD: CPT | Mod: S$GLB,,, | Performed by: COLON & RECTAL SURGERY

## 2017-02-20 PROCEDURE — 45330 DIAGNOSTIC SIGMOIDOSCOPY: CPT | Mod: S$GLB,,, | Performed by: COLON & RECTAL SURGERY

## 2017-02-20 PROCEDURE — 99205 OFFICE O/P NEW HI 60 MIN: CPT | Mod: 25,S$GLB,, | Performed by: COLON & RECTAL SURGERY

## 2017-02-20 RX ORDER — CYCLOSPORINE 0.5 MG/ML
1 EMULSION OPHTHALMIC NIGHTLY
COMMUNITY
Start: 2017-02-14

## 2017-02-21 ENCOUNTER — HOSPITAL ENCOUNTER (OUTPATIENT)
Dept: RADIOLOGY | Facility: HOSPITAL | Age: 52
Discharge: HOME | End: 2017-02-21
Attending: INTERNAL MEDICINE
Payer: COMMERCIAL

## 2017-02-21 DIAGNOSIS — C80.1 ADENOCARCINOMA IN A POLYP: ICD-10-CM

## 2017-02-21 PROCEDURE — 71260 CT THORAX DX C+: CPT | Mod: 26,,, | Performed by: RADIOLOGY

## 2017-02-21 PROCEDURE — 71260 CT THORAX DX C+: CPT | Mod: TC

## 2017-02-21 PROCEDURE — 74178 CT ABD&PLV WO CNTR FLWD CNTR: CPT | Mod: 26,,, | Performed by: RADIOLOGY

## 2017-02-21 PROCEDURE — 74178 CT ABD&PLV WO CNTR FLWD CNTR: CPT | Mod: TC

## 2017-02-21 PROCEDURE — 25500020 PHARM REV CODE 255: Performed by: INTERNAL MEDICINE

## 2017-02-21 RX ADMIN — IOHEXOL 30 ML: 350 INJECTION, SOLUTION INTRAVENOUS at 09:02

## 2017-02-21 RX ADMIN — IOHEXOL 75 ML: 350 INJECTION, SOLUTION INTRAVENOUS at 11:02

## 2017-02-21 NOTE — PROGRESS NOTES
Subjective:       Patient ID: Carmen Gray is a 51 y.o. female.    Chief Complaint: Colon Cancer    HPI New pt.  Underwent screening colonoscopy on 2/ 7/2017 - ([facility]rotter); 2 flat polyps in ascending colon, 7 and 12mminf -path tubular adenoma and hyperplastic.  Another sigmoid colon polyp -  Inflammatory polyp - and a polypoid lesion at about 21 cm - path invasive cancer with positive margin.  No lower GI symptoms. CEA = 2.4    Past Medical History   Diagnosis Date    Anxiety     Depression     Insomnia      Past Surgical History   Procedure Laterality Date    Tubal ligation      Gastric sleeve  2015    Cholecystectomy      Hernia repair       hiatal     Colonoscopy N/A 2/7/2017     Procedure: COLONOSCOPY  split;  Surgeon: Dallas Mar MD;  Location: [facility];  Service: Endoscopy;  Laterality: N/A;       Review of Systems   Constitutional: Negative for chills and fever.   Respiratory: Negative for cough and shortness of breath.    Cardiovascular: Negative for chest pain and palpitations.   Genitourinary: Negative for dysuria and urgency.   Neurological: Negative for seizures and numbness.   Psychiatric/Behavioral: Negative for agitation and behavioral problems.       Objective:      Physical Exam   Constitutional: She is oriented to person, place, and time. She appears well-developed and well-nourished.   Eyes: Conjunctivae and EOM are normal.   Pulmonary/Chest: Effort normal. No respiratory distress.   Abdominal: Soft. She exhibits no distension.   Genitourinary:   Genitourinary Comments: Anorectal: normal tone   Musculoskeletal: Normal range of motion. She exhibits no edema.   Neurological: She is alert and oriented to person, place, and time.   Skin: Skin is warm and dry.   Psychiatric: She has a normal mood and affect. Her behavior is normal.     Flexible Sigmoidoscopy Procedure Note    Procedure: Flexible Sigmoidoscopy    Pre-operative Diagnosis: Rectosigmoid colon  cancer    Post-operative Diagnosis: same    Indications: Recent cscope with colon cancer in partially removed polyp    Procedure Details     Informed consent was obtained for the procedure. Risks of perforation and hemorrhage were discussed. The patient was placed in the left lateral decubitus position, the anal region was examined, a rectal performed, then the 60cm flexible sigmoidoscope was inserted and advanced without difficulty to a distance of 30 cm. The prep was excellent. The instrument was withdrawnwith excellent views throughout.    Findings:  Residual mass at 22cm.  Tattoo was placed 5 cm distal in 3 quadrants.     Specimens: none           Complications:  None; patient tolerated the procedure well.           Disposition: PACU - hemodynamically stable.           Condition: stable    Impression:    no biopsies taken, bowel prep was adequate, procedure well tolerated without complications    Recommendations:  Surgery - sigmoid colectomy    Attending Attestation: I performed the procedure.  Assessment:       1. Cancer of rectosigmoid (colon)        Plan:       MSI testing.  CT chest/abd/pelvis

## 2017-02-23 ENCOUNTER — DOCUMENTATION ONLY (OUTPATIENT)
Dept: NEUROLOGY | Facility: HOSPITAL | Age: 52
End: 2017-02-23

## 2017-02-23 DIAGNOSIS — K63.5 POLYP OF COLON, UNSPECIFIED PART OF COLON, UNSPECIFIED TYPE: Primary | ICD-10-CM

## 2017-02-23 NOTE — PATIENT CARE CONFERENCE
General Tumor Board 2/17/17  52 y/o F who initially presented to her PCP for an annual physical exam. Routine workup with MMG, chest XR and labs were ordered. She was referred to Dr. Mar for screening colonoscopy. She underwent colonoscopy on 2/7/17. Multiple polyps were identified in the colon and were resected. Pathology showed invasive, moderately differentiated adenocarcinoma. - Presenter: Robert Mccartney MD / Carmen Phan MD    Recommendations:  1. CT Abdomen/Pelvis  2. PET Scan  3. Refer to surgery

## 2017-02-24 ENCOUNTER — TELEPHONE (OUTPATIENT)
Dept: GENETICS | Facility: CLINIC | Age: 52
End: 2017-02-24

## 2017-02-24 NOTE — TELEPHONE ENCOUNTER
----- Message from Mago Guerra sent at 2/22/2017  1:49 PM CST -----  Needs to be seen for colon cancer per Polo Abel M.D.

## 2017-03-03 ENCOUNTER — TELEPHONE (OUTPATIENT)
Dept: SURGERY | Facility: CLINIC | Age: 52
End: 2017-03-03

## 2017-03-03 NOTE — TELEPHONE ENCOUNTER
----- Message from Candida Burnette sent at 3/3/2017  8:18 AM CST -----  Contact: self  Pt said she need the dr to call her back to discuss a date for surgery ; call her at [phone]

## 2017-03-06 ENCOUNTER — OFFICE VISIT (OUTPATIENT)
Dept: GENETICS | Facility: CLINIC | Age: 52
End: 2017-03-06
Payer: COMMERCIAL

## 2017-03-06 ENCOUNTER — LAB VISIT (OUTPATIENT)
Dept: LAB | Facility: HOSPITAL | Age: 52
End: 2017-03-06
Attending: MEDICAL GENETICS
Payer: COMMERCIAL

## 2017-03-06 VITALS — BODY MASS INDEX: 23.45 KG/M2 | HEIGHT: 62 IN | WEIGHT: 127.44 LBS

## 2017-03-06 DIAGNOSIS — C18.9 MALIGNANT NEOPLASM OF COLON, UNSPECIFIED PART OF COLON: ICD-10-CM

## 2017-03-06 DIAGNOSIS — K63.5 POLYP OF COLON, UNSPECIFIED PART OF COLON, UNSPECIFIED TYPE: ICD-10-CM

## 2017-03-06 DIAGNOSIS — C18.9 MALIGNANT NEOPLASM OF COLON, UNSPECIFIED PART OF COLON: Primary | ICD-10-CM

## 2017-03-06 PROCEDURE — 99999 PR PBB SHADOW E&M-EST. PATIENT-LVL III: CPT | Mod: PBBFAC,,,

## 2017-03-06 PROCEDURE — 36415 COLL VENOUS BLD VENIPUNCTURE: CPT | Mod: PO

## 2017-03-06 PROCEDURE — 96040 PR GENETIC COUNSELING, EACH 30 MIN: CPT | Mod: S$GLB,,, | Performed by: MEDICAL GENETICS

## 2017-03-06 PROCEDURE — 99499 UNLISTED E&M SERVICE: CPT | Mod: S$GLB,,, | Performed by: MEDICAL GENETICS

## 2017-03-06 NOTE — PROGRESS NOTES
Carmen Gray  DOS: 17  : [birth year]  MRN: [id number]      REFERING MD: Polo Abel MD    REASON FOR CONSULT: Our Medical Genetic Service was asked to evaluate this 51-year-old  female for her recent diagnosis of rectosigmoid colon cancer. Ms. Gray presents to this appointment accompanied by her fiancée Ad.     HISTORY OF PRESENT ILLNESS: On 17, Ms. Gray had her first screening colonoscopy which identified 4 colon polyps. One 15 mm polyp was found to be moderately differentiated invasive adenocarcinoma. Microsatellite instability (MSI) was recommended and is pending.    Ms. Gray reported menarche was at age 10. She has about a 30 year history of OCP use. Her son was born when she was 26. She entered menopause in her 40s and has no history of HRT use. Ms. Gray had her last mammogram on 17 which was normal. She has no history of breast biopsies. Ms. Gray is a smoker.       FAMILY HISTORY: At this visit, a 3 generation pedigree was constructed and will be scanned in the patients chart.  Ms. Gray has a healthy 25-year-old son. The patients 64-year-old brother recently had a kidney removed due to a tumor (type unknown). The remainder of the family was noncontributory. Maternal ancestry is [place] and [place].  Paternal ancestry is Norwegian and [place].  Ashkenazi Alevism ancestry was denied.     IMPRESSION: We reviewed the patients medical and family history and discussed the genetics of cancer, cancer risks associated with a hereditary predisposition to cancer, and the benefits, risks, and limitations of genetic testing. We reviewed that MSI/IHC is a screening test for Cherry syndrome. Abnormal tumor testing (MSI-H) is seen in more than 90% of Cherry associated tumors.     Cherry syndrome, also known as hereditary non polyposis colorectal cancer (HNPCC) is an autosomal dominant disorder which predisposes the patient to cancer in the variety of organs. Cherry associated cancers  include colon (general population 5.5% vs 80% in HNPCC), endometrium (2.7% versus 20-60%), stomach (below 1% versus 11-19%), ovary (1.6% versus 9-12%), hepatobiliary (below 1% versus 2-7%), urinary tract (below 1% versus 4-5%), small bowel (below 1% versus 1-4%) and brain (below 1% versus 1-3%). Cherry syndrome is caused by germline mutations in mismatch repair genes (MLH1, MSH2, MSH6, PMS2, EPCAM).    We also discussed that while the family history is not suggestive of Cherry syndrome, germline testing would be helpful to determine cancer treatment and future screening/ prevention protocol.  [facility] offers a 7 gene Cherry/Colorectal High Risk Panel (APC, EPCAM, MLH1, MSH2, MSH6, MUTYH, and PMS2). We reviewed the various risk estimations and emphasized that there is variable expressivity and incomplete penetrance.  We also reviewed that having a mutation in a gene does not mean that the individual will develop the associated types of cancer. It does, however, mean that the chances of developing cancers are significantly increased.  A negative result certainly does not rule out a hereditary predisposition to cancer. We discussed the risk of findings a variant of uncertain significance as well as the potential insurance implications, limitations and provisions of MARJORIE. If a mutation is found in one these genes, we can offer targeted mutation testing to the patients son/ extended family. Ms. Gray expressed understanding and had all her questions answered to her satisfaction.     RECOMMENDATIONS:   1. Cherry/Colorectal High Risk Panel  7 gene Panel     2. If positive, advise the patient for appropriate healthcare management.  3. If positive, offer testing to appropriate relatives.   4. Follow-up when results are complete.      REFERENCE:  Keshav W, Erum SB. Cherry Syndrome. 2004 Feb 5 [Updated 2012 Sep 20]. In: Flower RA, Abram TD, Mango CR, et al., editors. GeneRAppTapws [Internet]. [place] ([place]): [facility]  [facility]; 1993-. Available from: http://www.ncbi.nlm.nih.gov/books/JOU3515/  Time spent: 60 minutes, more than 50% was spent in counseling. The note is in epic.     SARA Andino., BETO Monsalve, LAURA Brooks, SARA Palma, RELL Christian., Holter, SFauzia, ... & KEISHA Samson. (2012). Identification of individuals at risk for Cherry syndrome using targeted evaluations and genetic testing: National Society of Genetic Counselors and the Collaborative Group of the Americas on Inherited Colorectal Cancer joint practice guideline. Journal of genetic counseling, 21(4), 484-493.      Abraham Ramsay M.D.                                                               Mago Guerra MS  Section Head - Medical Genetics                                                  Genetic Counselor           [facility]                                                                                       [email or web address]

## 2017-03-06 NOTE — LETTER
March 6, 2017      Polo Abel MD  [address]  [facility] [address]           [facility] - Genetics  [address]  [facility] [address]  Phone: [phone]          Patient: Carmen Gray   MR Number: [id number]   YOB: 1965   Date of Visit: 3/6/2017       Dear Dr. Polo Abel:    Thank you for referring Carmen Gray to me for evaluation. Attached you will find relevant portions of my assessment and plan of care.    If you have questions, please do not hesitate to call me. I look forward to following Carmen Gray along with you.    Sincerely,    Mago Guerra    Enclosure  CC:  No Recipients    If you would like to receive this communication electronically, please contact [email or web address] or [phone] to request more information on appweevr Link access.    For providers and/or their staff who would like to refer a patient to [facility], please contact us through our one-stop-shop provider referral line, Elsa Yost, at [phone].    If you feel you have received this communication in error or would no longer like to receive these types of communications, please e-mail [email or web address]

## 2017-03-06 NOTE — MR AVS SNAPSHOT
[facility] - Genetics  [address]  [place] [address]  Phone: [phone]                  Carmen Gray   3/6/2017 8:00 AM   Appointment    Description:  Female : 1965   Provider:  Mago Guerra   Department:  [facility] - Genetics                To Do List           Future Appointments        Provider Department Dept Phone    3/10/2017 10:45 AM Polo Abel MD [facility]-Colon and Rectal Surg [phone]      Your Future Surgeries/Procedures     Mar 28, 2017   Surgery with Polo Abel MD   [facility])    [address] [address] [phone]              Goals (5 Years of Data)     None      [facility]     [facility] Nurse Care Line -  Assistance  Registered nurses in the [facility] provide clinical advisement, health education, appointment booking, and other advisory services.  Call for this free service at [phone].             Medications           Message regarding Medications     Verify the changes and/or additions to your medication regime listed below are the same as discussed with your clinician today.  If any of these changes or additions are incorrect, please notify your healthcare provider.             Verify that the below list of medications is an accurate representation of the medications you are currently taking.  If none reported, the list may be blank. If incorrect, please contact your healthcare provider. Carry this list with you in case of emergency.           Current Medications     gabapentin (NEURONTIN) 300 MG capsule Take 1 capsule (300 mg total) by mouth every evening.    RESTASIS 0.05 % ophthalmic emulsion            Clinical Reference Information           Allergies as of 3/6/2017     No Known Allergies      Immunizations Administered on Date of Encounter - 3/6/2017     None      Smoking Cessation     If you would like to quit smoking:   You may be  eligible for free services if you are a [place] resident and started smoking cigarettes before September 1, 1988.  Call the Smoking Cessation Trust ([facility]) toll free at (757) 677-1749 or (933) 740-1547.   Call 1-800-QUIT-NOW if you do not meet the above criteria.            Language Assistance Services     ATTENTION: Language assistance services are available, free of charge. Please call [phone].      ATENCIÓN: Si habla español, tiene a chin disposición servicios gratuitos de asistencia lingüística. Llame al [phone].     [facility] Ý: N?u b?n nói Ti?ng Vi?t, có các d?ch v? h? tr? ngôn ng? mi?n phí dành cho b?n. G?i s? [phone].         [facility] complies with applicable Federal civil rights laws and does not discriminate on the basis of race, color, national origin, age, disability, or sex.

## 2017-03-10 ENCOUNTER — OFFICE VISIT (OUTPATIENT)
Dept: SURGERY | Facility: CLINIC | Age: 52
End: 2017-03-10
Payer: COMMERCIAL

## 2017-03-10 VITALS
SYSTOLIC BLOOD PRESSURE: 120 MMHG | HEART RATE: 66 BPM | DIASTOLIC BLOOD PRESSURE: 86 MMHG | WEIGHT: 125.44 LBS | BODY MASS INDEX: 23.08 KG/M2 | HEIGHT: 62 IN

## 2017-03-10 DIAGNOSIS — C19 RECTOSIGMOID CANCER: Primary | ICD-10-CM

## 2017-03-10 PROCEDURE — 99499 UNLISTED E&M SERVICE: CPT | Mod: S$GLB,,, | Performed by: COLON & RECTAL SURGERY

## 2017-03-10 PROCEDURE — 99999 PR PBB SHADOW E&M-EST. PATIENT-LVL II: CPT | Mod: PBBFAC,,, | Performed by: COLON & RECTAL SURGERY

## 2017-03-10 RX ORDER — NEOMYCIN SULFATE 500 MG/1
TABLET ORAL
Qty: 6 TABLET | Refills: 0 | Status: ON HOLD | OUTPATIENT
Start: 2017-03-10 | End: 2017-03-30 | Stop reason: HOSPADM

## 2017-03-10 RX ORDER — METRONIDAZOLE 500 MG/1
TABLET ORAL
Qty: 3 TABLET | Refills: 0 | Status: ON HOLD | OUTPATIENT
Start: 2017-03-10 | End: 2017-03-30 | Stop reason: HOSPADM

## 2017-03-12 NOTE — PROGRESS NOTES
Subjective:        Patient ID: Carmen Gray is a 51 y.o. female.     Chief Complaint: Colon Cancer     HPI New pt. Underwent screening colonoscopy on 2/ 7/2017 - ([facility] [place]); 2 flat polyps in ascending colon, 7 and 12mminf -path tubular adenoma and hyperplastic. Another sigmoid colon polyp - Inflammatory polyp - and a polypoid lesion at about 21 cm - path invasive cancer with positive margin.  No lower GI symptoms. CEA = 2.4.  CT chest /abd/ pelvis - no metastatic dz.  FFS by me at last clinic visit - residual at 22cm, additional tattoo placed.           Past Medical History   Diagnosis Date    Anxiety      Depression      Insomnia               Past Surgical History   Procedure Laterality Date    Tubal ligation        Gastric sleeve   2015    Cholecystectomy        Hernia repair           hiatal     Colonoscopy N/A 2/7/2017       Procedure: COLONOSCOPY split; Surgeon: Dallas Mar MD; Location: [facility]; Service: Endoscopy; Laterality: N/A;         Review of Systems   Constitutional: Negative for chills and fever.   Respiratory: Negative for cough and shortness of breath.   Cardiovascular: Negative for chest pain and palpitations.   Genitourinary: Negative for dysuria and urgency.   Neurological: Negative for seizures and numbness.   Psychiatric/Behavioral: Negative for agitation and behavioral problems.       Objective:      Physical Exam   Constitutional: She is oriented to person, place, and time. She appears well-developed and well-nourished.   Eyes: Conjunctivae and EOM are normal.   Pulmonary/Chest: Effort normal. No respiratory distress.   Abdominal: Soft. She exhibits no distension.   Genitourinary:   Genitourinary Comments: Anorectal: normal tone   Musculoskeletal: Normal range of motion. She exhibits no edema.   Neurological: She is alert and oriented to person, place, and time.   Skin: Skin is warm and dry.   Psychiatric: She has a normal mood and affect. Her behavior  is normal.         Assessment:       1. Cancer of rectosigmoid (colon)        Plan:     Lap assisted sigmoid colectomy.  I have explained the risks, benefits, and alternatives of the procedure in detail.  The patient voices understanding and all questions have been answered. The patient agrees to proceed as planned.    Preop teaching done to include:    Mechanical bowel prep instruction sheet provided which includes instructions for prep and pre-op abx schedule, NPO after MN, and Hibiclens baths prior to surgery.     Details of planned surgery reviewed:  · Review of procedure  · Expected LOS.   · Preop process- application of DAVID hose and SCD's, IV and IVF  · Different methods of post op pain control (IV and oral)   · Use of and importance of IS and other prophylaxis  · Expected early ambulation  · Slow advancement of diet  · Ostomy and ostomy care if indicated   · Pathology report generally takes 5 or more working days    Goals that need to be met before discharge:  · No fever  · Functional status at baseline   · Tolerating low fiber diet   · Positive bowel function  · Adequate pain control with oral meds  · Vital signs and labs stable    Home instructions begun during this preop visit:   · May shower (no tub bath),   · No heavy lifting, nothing greater then 5 pounds,  · Small frequent meals, low residue,   · Ostomy care if needed,   · Call for fever above 101, nausea/vomiting, no bm and any increase in pain     All questions answered to pt and family satisfaction.   Polo Abel

## 2017-03-13 ENCOUNTER — TELEPHONE (OUTPATIENT)
Dept: SURGERY | Facility: CLINIC | Age: 52
End: 2017-03-13

## 2017-03-13 NOTE — TELEPHONE ENCOUNTER
----- Message from Kait Shelby sent at 3/13/2017  9:17 AM CDT -----  Contact: Pt# [phone]  Pt states she having some issues with Jury Duty and need some type of paper work

## 2017-03-14 ENCOUNTER — ANESTHESIA EVENT (OUTPATIENT)
Dept: SURGERY | Facility: HOSPITAL | Age: 52
DRG: 331 | End: 2017-03-14
Payer: COMMERCIAL
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
## 2017-03-16 ENCOUNTER — TELEPHONE (OUTPATIENT)
Dept: GASTROENTEROLOGY | Facility: CLINIC | Age: 52
End: 2017-03-16

## 2017-03-16 ENCOUNTER — PATIENT MESSAGE (OUTPATIENT)
Dept: INTERNAL MEDICINE | Facility: CLINIC | Age: 52
End: 2017-03-16

## 2017-03-16 ENCOUNTER — TELEPHONE (OUTPATIENT)
Dept: GENETICS | Facility: CLINIC | Age: 52
End: 2017-03-16

## 2017-03-16 RX ORDER — VARENICLINE TARTRATE 0.5 (11)-1
KIT ORAL
Qty: 1 PACKAGE | Refills: 1 | Status: SHIPPED | OUTPATIENT
Start: 2017-03-16 | End: 2017-05-10

## 2017-03-16 NOTE — TELEPHONE ENCOUNTER
Spoke to Ms. Gray to discuss the results of the Cherry high risk panel which was negative. The patient was confused about how this testing was different from MSI/IHC. I explained that MSI/IHC is a screening test for Cherry but germline testing is a diagnostic test. The family history is not suggestive for a hereditary predisposition to colon cancer which is reassured.

## 2017-03-16 NOTE — TELEPHONE ENCOUNTER
Spoke with patient to inform her that the tumor is not associated with Cherry syndrome abnormalities. Advised patient that chances of this being genetic is lowered. Patient verbalized understanding.  ----- Message from Dallas Mar MD sent at 3/15/2017 11:27 AM CDT -----  Review of additional testing performed on the initial tumor biopsies from the procedure dated 7 February 2017:  -Microsatellite instability testing: The combination of normal protein expression and an INDER/MSI-L phenotype suggests the presence of normal DNA mismatch repair function within the tumor.    Impression: Findings indicate that the tumor IS NOT associated with Cherry syndrome abnormalities.  -This mainly decreases potential impact for the patient's related family members    Dallas Mar MD, FACP, FACG, AGAF [facility] [address], [place] [address]  Phone: [phone] Fax: [phone] [address], [address], [place] [address]  Phone: [phone] Fax: [phone]

## 2017-03-16 NOTE — TELEPHONE ENCOUNTER
chantix ordered  Smoking quit date to be set at 1 week from initiation  My recommendation is as follows  0.5 mg daily for three days, then 0.5 mg twice daily for four days, and then 1 mg twice daily for the remainder of a 12-week course

## 2017-03-17 ENCOUNTER — PATIENT MESSAGE (OUTPATIENT)
Dept: SURGERY | Facility: CLINIC | Age: 52
End: 2017-03-17

## 2017-03-17 LAB
MISCELLANEOUS TEST NAME: NORMAL
REFERENCE LAB: NORMAL
SPECIMEN TYPE: NORMAL
TEST RESULT: NORMAL

## 2017-03-27 ENCOUNTER — HOSPITAL ENCOUNTER (OUTPATIENT)
Dept: PREADMISSION TESTING | Facility: HOSPITAL | Age: 52
Discharge: HOME | DRG: 331 | End: 2017-03-27
Attending: ANESTHESIOLOGY
Payer: COMMERCIAL

## 2017-03-27 VITALS
DIASTOLIC BLOOD PRESSURE: 78 MMHG | RESPIRATION RATE: 18 BRPM | TEMPERATURE: 98 F | HEART RATE: 65 BPM | SYSTOLIC BLOOD PRESSURE: 134 MMHG | WEIGHT: 125 LBS | BODY MASS INDEX: 23 KG/M2 | HEIGHT: 62 IN | OXYGEN SATURATION: 100 %

## 2017-03-27 NOTE — DISCHARGE INSTRUCTIONS
Your surgery has been scheduled for:__________________________________________    You should report to:  ____[facility], located on the [place] side of the first floor of the           [facility] ([phone])  ____[facility], located on the [facility] side of the            Second floor of the [facility] ([phone])  ____3rd Floor [facility]U located on the [facility] side of the [facility] [phone]  Please Note   - Tell your doctor if you take Aspirin, products containing Aspirin, herbal medications  or blood thinners, such as Coumadin, Ticlid, or Plavix.  (Consult your provider regarding holding or stopping before surgery).  - Arrange for someone to drive you home following surgery.  You will not be allowed to leave the surgical facility alone or drive yourself home following sedation and anesthesia.  Before Surgery  - Stop taking all herbal medications 14days prior to surgery  - No Motrin/Advil (Ibuprofen) 7 days before surgery  - No Aleve (Naproxen) 7 days before surgery  - Stop Taking Asprin, products containing Asprin _____days before surgery  - Stop taking blood thinners_______days before surgery  - Refrain from drinking alcoholic beverages for 24hours before and after surgery  - Stop or limit smoking _________days before surgery  Night before Surgery  - DO NOT EAT OR DRINK ANYTHING AFTER MIDNIGHT, INCLUDING GUM, HARD CANDY, MINTS, OR CHEWING TOBACCO.  - Take a shower or bath (shower is recommended).  Bathe with Hibiclens soap or an antibacterial soap from the neck down.  If not supplied by your surgeon, hibiclens soap will need to be purchased over the counter in pharmacy.  Rinse soap off thoroughly.  - Shampoo your hair with your regular shampoo  The Day of Surgery  - Take another bath or shower with hibiclens or any antibacterial soap, to reduce the chance of infection.  - Take heart and blood  pressure medications with a small sip of water, as advised by the perioperative team.  - Do not take fluid pills  - You may brush your teeth and rinse your mouth, but do not swall any additional water.   - Do not apply perfumes, powder, body lotions or deodorant on the day of surgery.  - Nail polish should be removed.  - Do not wear makeup or moisturizer  - Wear comfortable clothes, such as a button front shirt and loose fitting pants.  - Leave all jewelry, including body piercings, and valuables at home.    - Bring any devices you will neeed after surgery such as crutches or canes.  - If you have sleep apnea, please bring your CPAP machine  In the event that your physical condition changes including the onset of a cold or respiratory illness, or if you have to delay or cancel your surgery, please notify your surgeon.Anesthesia: General Anesthesia  Youre due to have surgery. During surgery, youll be given medication called anesthesia. (It is also called anesthetic.) This will keep you comfortable and pain-free. Your surgeon will use general anesthesia. This sheet tells you more about it.    What Is General Anesthesia?  General anesthesia puts you into a state like deep sleep. It goes into the bloodstream (IV anesthetics), into the lungs (gas anesthetics), or both. You feel nothing during the procedure. You will not remember it. During the procedure, the anesthesia provider monitors you. He or she checks your heart rate and rhythm, blood pressure, and blood oxygen.  · IV Anesthetics  IV anesthetics are given through an IV line in your arm. Theyre often given first. This is so you are asleep before a gas anesthetic is started. Some kinds of IV anesthetics relieve pain. Others relax you. Your doctor will decide which kind is best in your case.  · Gas Anesthetics  Gas anesthetics are breathed into the lungs. They are often used to keep you asleep. They can be given through a facemask, an endotracheal tube, or a  laryngeal mask airway.  ¨ If you have a facemask, your anesthesia provider will most likely place it over your nose and mouth while youre still awake. Youll breathe oxygen through the mask as your IV anesthetic is started. Gas anesthetic may be added through the mask.  ¨ If you have an endotracheal tube or a laryngeal mask airway, it will be inserted into your throat after youre asleep.  Anesthesia Tools and Medications  You will likely have:  · IV anesthetics sent through an IV line into your bloodstream.  · Gas anesthetics breathed into your lungs, where they pass into your bloodstream.  · A pulse oximeter on the end of your finger. This measures your blood oxygen level.  · Electrocardiography leads (electrodes) on your chest. These record your heart rate and rhythm.  · A blood pressure cuff. This reads your blood pressure.  Risks and Possible Complications  General anesthesia has some risks. These include:  · Breathing problems  · Nausea and vomiting  · Sore throat or hoarseness  · Allergic reaction to the anesthetic  · Ongoing numbness (rare)  · Irregular heartbeat (rare)  · Cardiac arrest (rare)   Anesthesia Safety  · Follow all instructions you are given for how long not to eat or drink before your procedure.  · Be sure your doctor knows what medications you take. This includes over-the-counter medications, herbs, and supplements.  · Have an adult family member or friend drive you home after the procedure.  · For the first 24 hours after your surgery:  ¨ Do not drive or use heavy equipment.  ¨ Do not make important decisions or sign documents.  ¨ Avoid alcohol.  ¨ Have someone stay with you, if possible. They can watch for problems and help keep you safe.  © 6046-5800 Anam Rodriguez, [address] [place] [address], [place] [address]. All rights reserved. This information is not intended as a substitute for professional medical care. Always follow your healthcare professional's instructions.

## 2017-03-27 NOTE — IP AVS SNAPSHOT
[facility]  [address]  [facility] [address]  Phone: [phone]           Patient Discharge Instructions    Our goal is to set you up for success. This packet includes information on your condition, medications, and your home care. It will help you to care for yourself so you don't get sicker.     Please ask your nurse if you have any questions.        There are many details to remember when preparing for your surgery. Here is what you will need to do, please ask your nurse if there are more specific instructions and if you have any questions:    1. 24 hours before procedure Do not smoke or drink alcoholic beverages 24 hours prior to your procedure    2. Eating before procedure Do not eat or drink anything 8 hours before your procedure - this includes gum, mints, and candy.     3. Day of procedure Please remove all jewelry for the procedure. If you wear contact lenses, dentures, hearing aids or glasses, bring a container to put them in during your surgery and give to a family member for safekeeping.  If your doctor has scheduled you for an overnight stay, bring a small overnight bag with any personal items that you need.    4. After procedure Make arrangements in advance for transportation home by a responsible adult. It is not safe to drive a vehicle during the 24 hours following surgery.     PLEASE NOTE: You may be contacted the day before your surgery to confirm your surgery date and arrival time. The Surgery schedule has many variables which may affect the time of your surgery case. Family members should be available if your surgery time changes.                [facility]  Unless otherwise directed by your provider, please contact [facility], our nurse care line that is available for 24/7 assistance.     [phone] (toll-free)    Registered nurses in the [facility] provide clinical advisement, health education, appointment booking, and other  advisory services.                    ** Verify the list of medication(s) below is accurate and up to date. Carry this with you in case of emergency. If your medications have changed, please notify your healthcare provider.             Medication List      TAKE these medications        Additional Info                      gabapentin 300 MG capsule   Commonly known as:  NEURONTIN   Quantity:  30 capsule   Refills:  2   Dose:  300 mg    Instructions:  Take 1 capsule (300 mg total) by mouth every evening.     Begin Date    AM    Noon    PM    Bedtime       metronidazole 500 MG tablet   Commonly known as:  FLAGYL   Quantity:  3 tablet   Refills:  0    Instructions:  On the day before your surgery, take 1 tablet (500 mg) by mouth at 1pm, 2pm and 11pm.     Begin Date    AM    Noon    PM    Bedtime       neomycin 500 mg Tab   Commonly known as:  MYCIFRADIN   Quantity:  6 tablet   Refills:  0    Instructions:  On the day before your surgery, take 2 tablets (1,000 mg) by mouth at 1pm, 2pm and 11pm.     Begin Date    AM    Noon    PM    Bedtime       RESTASIS 0.05 % ophthalmic emulsion   Refills:  0   Generic drug:  cycloSPORINE      Begin Date    AM    Noon    PM    Bedtime       varenicline 0.5 mg (11)- 1 mg (42) tablet   Commonly known as:  CHANTIX STARTING MONTH PAK   Quantity:  1 Package   Refills:  1    Instructions:  Take one 0.5mg tab by mouth once daily X3 days,then increase to one 0.5mg tab twice daily X4 days,then increase to one 1mg tab twice daily     Begin Date    AM    Noon    PM    Bedtime                  Please bring to all follow up appointments:    1. A copy of your discharge instructions.  2. All medicines you are currently taking in their original bottles.  3. Identification and insurance card.    Please arrive 15 minutes ahead of scheduled appointment time.    Please call 24 hours in advance if you must reschedule your appointment and/or time.        Your Future Surgeries/Procedures     Mar 28, 2017    Surgery with Polo Abel MD   [facility] ([facility])    [address] [facility] [address] [phone]                  Discharge Instructions       Your surgery has been scheduled for:__________________________________________    You should report to:  ____[facility], located on the [place] side of the first floor of the           [facility] ([phone])  ____[facility], located on the [facility] side of the            Second floor of the [facility] ([phone])  ____3rd Floor [facility]U located on the [facility] side of the [facility] [phone]  Please Note   - Tell your doctor if you take Aspirin, products containing Aspirin, herbal medications  or blood thinners, such as Coumadin, Ticlid, or Plavix.  (Consult your provider regarding holding or stopping before surgery).  - Arrange for someone to drive you home following surgery.  You will not be allowed to leave the surgical facility alone or drive yourself home following sedation and anesthesia.  Before Surgery  - Stop taking all herbal medications 14days prior to surgery  - No Motrin/Advil (Ibuprofen) 7 days before surgery  - No Aleve (Naproxen) 7 days before surgery  - Stop Taking Asprin, products containing Asprin _____days before surgery  - Stop taking blood thinners_______days before surgery  - Refrain from drinking alcoholic beverages for 24hours before and after surgery  - Stop or limit smoking _________days before surgery  Night before Surgery  - DO NOT EAT OR DRINK ANYTHING AFTER MIDNIGHT, INCLUDING GUM, HARD CANDY, MINTS, OR CHEWING TOBACCO.  - Take a shower or bath (shower is recommended).  Bathe with Hibiclens soap or an antibacterial soap from the neck down.  If not supplied by your surgeon, hibiclens soap will need to be purchased over the counter in pharmacy.  Rinse soap off  thoroughly.  - Shampoo your hair with your regular shampoo  The Day of Surgery  - Take another bath or shower with hibiclens or any antibacterial soap, to reduce the chance of infection.  - Take heart and blood pressure medications with a small sip of water, as advised by the perioperative team.  - Do not take fluid pills  - You may brush your teeth and rinse your mouth, but do not swall any additional water.   - Do not apply perfumes, powder, body lotions or deodorant on the day of surgery.  - Nail polish should be removed.  - Do not wear makeup or moisturizer  - Wear comfortable clothes, such as a button front shirt and loose fitting pants.  - Leave all jewelry, including body piercings, and valuables at home.    - Bring any devices you will neeed after surgery such as crutches or canes.  - If you have sleep apnea, please bring your CPAP machine  In the event that your physical condition changes including the onset of a cold or respiratory illness, or if you have to delay or cancel your surgery, please notify your surgeon.Anesthesia: General Anesthesia  Youre due to have surgery. During surgery, youll be given medication called anesthesia. (It is also called anesthetic.) This will keep you comfortable and pain-free. Your surgeon will use general anesthesia. This sheet tells you more about it.    What Is General Anesthesia?  General anesthesia puts you into a state like deep sleep. It goes into the bloodstream (IV anesthetics), into the lungs (gas anesthetics), or both. You feel nothing during the procedure. You will not remember it. During the procedure, the anesthesia provider monitors you. He or she checks your heart rate and rhythm, blood pressure, and blood oxygen.  · IV Anesthetics  IV anesthetics are given through an IV line in your arm. Theyre often given first. This is so you are asleep before a gas anesthetic is started. Some kinds of IV anesthetics relieve pain. Others relax you. Your doctor will  decide which kind is best in your case.  · Gas Anesthetics  Gas anesthetics are breathed into the lungs. They are often used to keep you asleep. They can be given through a facemask, an endotracheal tube, or a laryngeal mask airway.  ¨ If you have a facemask, your anesthesia provider will most likely place it over your nose and mouth while youre still awake. Youll breathe oxygen through the mask as your IV anesthetic is started. Gas anesthetic may be added through the mask.  ¨ If you have an endotracheal tube or a laryngeal mask airway, it will be inserted into your throat after youre asleep.  Anesthesia Tools and Medications  You will likely have:  · IV anesthetics sent through an IV line into your bloodstream.  · Gas anesthetics breathed into your lungs, where they pass into your bloodstream.  · A pulse oximeter on the end of your finger. This measures your blood oxygen level.  · Electrocardiography leads (electrodes) on your chest. These record your heart rate and rhythm.  · A blood pressure cuff. This reads your blood pressure.  Risks and Possible Complications  General anesthesia has some risks. These include:  · Breathing problems  · Nausea and vomiting  · Sore throat or hoarseness  · Allergic reaction to the anesthetic  · Ongoing numbness (rare)  · Irregular heartbeat (rare)  · Cardiac arrest (rare)   Anesthesia Safety  · Follow all instructions you are given for how long not to eat or drink before your procedure.  · Be sure your doctor knows what medications you take. This includes over-the-counter medications, herbs, and supplements.  · Have an adult family member or friend drive you home after the procedure.  · For the first 24 hours after your surgery:  ¨ Do not drive or use heavy equipment.  ¨ Do not make important decisions or sign documents.  ¨ Avoid alcohol.  ¨ Have someone stay with you, if possible. They can watch for problems and help keep you safe.  © 8193-5259 Anam Rodriguez, [address] [place] [address]  [place] [address]. All rights reserved. This information is not intended as a substitute for professional medical care. Always follow your healthcare professional's instructions.      Admission Information     Date & Time Provider Department CSN    3/27/2017 11:00 AM Rhonda G Leopold, MD [facility] [id number]      Care Providers     Provider Role Specialty Primary office phone    Rhonda G Leopold, MD Attending Provider Anesthesiology [phone]      Recent Lab Values        1/5/2015 1/24/2017                        1:55 PM  7:51 AM          A1C 5.4 5.3          Comment for A1C at  7:51 AM on 1/24/2017:  According to ADA guidelines, hemoglobin A1C <7.0% represents  optimal control in non-pregnant diabetic patients.  Different  metrics may apply to specific populations.   Standards of Medical Care in Diabetes - 2016.  For the purpose of screening for the presence of diabetes:  <5.7%     Consistent with the absence of diabetes  5.7-6.4%  Consistent with increasing risk for diabetes   (prediabetes)  >or=6.5%  Consistent with diabetes  Currently no consensus exists for use of hemoglobin A1C  for diagnosis of diabetes for children.        Allergies as of 3/27/2017        Reactions    Codeine Itching    Hydrocodone Itching      Advance Directives     An advance directive is a document which, in the event you are no longer able to make decisions for yourself, tells your healthcare team what kind of treatment you do or do not want to receive, or who you would like to make those decisions for you.  If you do not currently have an advance directive, [facility] encourages you to create one.  For more information call:  [phone]), [phone] ([phone]),  or log on to [email or web address].        Language Assistance Services     ATTENTION: Language assistance services are available, free of charge. Please call [phone].      ATENCIÓN: krsitina Gaffney  disposición servicios gratuitos de asistencia lingüística. Chadwick al [phone].     IBRAHIMA Ý: N?u b?n nói Ti?ng Vi?t, có các d?ch v? h? tr? ngôn ng? mi?n phí dành cho b?n. G?i s? [phone].         [facility] complies with applicable Federal civil rights laws and does not discriminate on the basis of race, color, national origin, age, disability, or sex.

## 2017-03-28 ENCOUNTER — ANESTHESIA (OUTPATIENT)
Dept: SURGERY | Facility: HOSPITAL | Age: 52
DRG: 331 | End: 2017-03-28
Payer: COMMERCIAL

## 2017-03-28 ENCOUNTER — HOSPITAL ENCOUNTER (INPATIENT)
Facility: HOSPITAL | Age: 52
LOS: 2 days | Discharge: HOME | DRG: 331 | End: 2017-03-30
Attending: COLON & RECTAL SURGERY | Admitting: COLON & RECTAL SURGERY
Payer: COMMERCIAL

## 2017-03-28 DIAGNOSIS — Z12.11 SCREEN FOR COLON CANCER: ICD-10-CM

## 2017-03-28 DIAGNOSIS — G25.81 RESTLESS LEG SYNDROME: ICD-10-CM

## 2017-03-28 DIAGNOSIS — Z72.0 TOBACCO ABUSE: ICD-10-CM

## 2017-03-28 DIAGNOSIS — K63.5 COLON POLYP: ICD-10-CM

## 2017-03-28 DIAGNOSIS — G47.00 INSOMNIA, UNSPECIFIED TYPE: Primary | ICD-10-CM

## 2017-03-28 PROCEDURE — 36000710: Performed by: COLON & RECTAL SURGERY

## 2017-03-28 PROCEDURE — 88302 TISSUE EXAM BY PATHOLOGIST: CPT | Mod: 26,,, | Performed by: PATHOLOGY

## 2017-03-28 PROCEDURE — 63600175 PHARM REV CODE 636 W HCPCS: Performed by: NURSE PRACTITIONER

## 2017-03-28 PROCEDURE — 94760 N-INVAS EAR/PLS OXIMETRY 1: CPT

## 2017-03-28 PROCEDURE — 37000008 HC ANESTHESIA 1ST 15 MINUTES: Performed by: COLON & RECTAL SURGERY

## 2017-03-28 PROCEDURE — 27000221 HC OXYGEN, UP TO 24 HOURS

## 2017-03-28 PROCEDURE — D9220A PRA ANESTHESIA: Mod: ,,, | Performed by: ANESTHESIOLOGY

## 2017-03-28 PROCEDURE — 94799 UNLISTED PULMONARY SVC/PX: CPT

## 2017-03-28 PROCEDURE — 27201423 OPTIME MED/SURG SUP & DEVICES STERILE SUPPLY: Performed by: COLON & RECTAL SURGERY

## 2017-03-28 PROCEDURE — 20600001 HC STEP DOWN PRIVATE ROOM

## 2017-03-28 PROCEDURE — 37000009 HC ANESTHESIA EA ADD 15 MINS: Performed by: COLON & RECTAL SURGERY

## 2017-03-28 PROCEDURE — 63600175 PHARM REV CODE 636 W HCPCS: Performed by: STUDENT IN AN ORGANIZED HEALTH CARE EDUCATION/TRAINING PROGRAM

## 2017-03-28 PROCEDURE — 25000003 PHARM REV CODE 250: Performed by: STUDENT IN AN ORGANIZED HEALTH CARE EDUCATION/TRAINING PROGRAM

## 2017-03-28 PROCEDURE — 25000003 PHARM REV CODE 250: Performed by: SURGERY

## 2017-03-28 PROCEDURE — 88307 TISSUE EXAM BY PATHOLOGIST: CPT | Performed by: PATHOLOGY

## 2017-03-28 PROCEDURE — 25000003 PHARM REV CODE 250: Performed by: NURSE PRACTITIONER

## 2017-03-28 PROCEDURE — 71000039 HC RECOVERY, EACH ADD'L HOUR: Performed by: COLON & RECTAL SURGERY

## 2017-03-28 PROCEDURE — 63600175 PHARM REV CODE 636 W HCPCS: Performed by: SURGERY

## 2017-03-28 PROCEDURE — 0DTN4ZZ RESECTION OF SIGMOID COLON, PERCUTANEOUS ENDOSCOPIC APPROACH: ICD-10-PCS | Performed by: COLON & RECTAL SURGERY

## 2017-03-28 PROCEDURE — 71000033 HC RECOVERY, INTIAL HOUR: Performed by: COLON & RECTAL SURGERY

## 2017-03-28 PROCEDURE — 36000711: Performed by: COLON & RECTAL SURGERY

## 2017-03-28 PROCEDURE — 44207 L COLECTOMY/COLOPROCTOSTOMY: CPT | Mod: ,,, | Performed by: COLON & RECTAL SURGERY

## 2017-03-28 PROCEDURE — 25000003 PHARM REV CODE 250: Performed by: COLON & RECTAL SURGERY

## 2017-03-28 RX ORDER — SODIUM CHLORIDE 0.9 % (FLUSH) 0.9 %
3 SYRINGE (ML) INJECTION EVERY 8 HOURS
Status: DISCONTINUED | OUTPATIENT
Start: 2017-03-28 | End: 2017-03-28 | Stop reason: HOSPADM

## 2017-03-28 RX ORDER — KETAMINE HCL IN 0.9 % NACL 50 MG/5 ML
SYRINGE (ML) INTRAVENOUS
Status: DISCONTINUED | OUTPATIENT
Start: 2017-03-28 | End: 2017-03-28

## 2017-03-28 RX ORDER — LIDOCAINE HYDROCHLORIDE 10 MG/ML
1 INJECTION, SOLUTION EPIDURAL; INFILTRATION; INTRACAUDAL; PERINEURAL ONCE
Status: COMPLETED | OUTPATIENT
Start: 2017-03-28 | End: 2017-03-28

## 2017-03-28 RX ORDER — ACETAMINOPHEN 10 MG/ML
1000 INJECTION, SOLUTION INTRAVENOUS EVERY 8 HOURS
Status: DISPENSED | OUTPATIENT
Start: 2017-03-28 | End: 2017-03-29

## 2017-03-28 RX ORDER — MIDAZOLAM HYDROCHLORIDE 1 MG/ML
INJECTION, SOLUTION INTRAMUSCULAR; INTRAVENOUS
Status: DISCONTINUED | OUTPATIENT
Start: 2017-03-28 | End: 2017-03-28

## 2017-03-28 RX ORDER — ACETAMINOPHEN 10 MG/ML
1000 INJECTION, SOLUTION INTRAVENOUS
Status: COMPLETED | OUTPATIENT
Start: 2017-03-28 | End: 2017-03-28

## 2017-03-28 RX ORDER — PHENYLEPHRINE HYDROCHLORIDE 10 MG/ML
INJECTION INTRAVENOUS
Status: DISCONTINUED | OUTPATIENT
Start: 2017-03-28 | End: 2017-03-28

## 2017-03-28 RX ORDER — DIPHENHYDRAMINE HYDROCHLORIDE 50 MG/ML
12.5 INJECTION INTRAMUSCULAR; INTRAVENOUS EVERY 6 HOURS PRN
Status: DISCONTINUED | OUTPATIENT
Start: 2017-03-28 | End: 2017-03-30 | Stop reason: HOSPADM

## 2017-03-28 RX ORDER — HYDROMORPHONE HYDROCHLORIDE 1 MG/ML
1 INJECTION, SOLUTION INTRAMUSCULAR; INTRAVENOUS; SUBCUTANEOUS EVERY 4 HOURS PRN
Status: DISCONTINUED | OUTPATIENT
Start: 2017-03-28 | End: 2017-03-30

## 2017-03-28 RX ORDER — METRONIDAZOLE 500 MG/100ML
500 INJECTION, SOLUTION INTRAVENOUS
Status: COMPLETED | OUTPATIENT
Start: 2017-03-28 | End: 2017-03-28

## 2017-03-28 RX ORDER — NALOXONE HCL 0.4 MG/ML
0.02 VIAL (ML) INJECTION
Status: DISCONTINUED | OUTPATIENT
Start: 2017-03-28 | End: 2017-03-30 | Stop reason: HOSPADM

## 2017-03-28 RX ORDER — NEOSTIGMINE METHYLSULFATE 1 MG/ML
INJECTION, SOLUTION INTRAVENOUS
Status: DISCONTINUED | OUTPATIENT
Start: 2017-03-28 | End: 2017-03-28

## 2017-03-28 RX ORDER — ALVIMOPAN 12 MG/1
12 CAPSULE ORAL
Status: COMPLETED | OUTPATIENT
Start: 2017-03-28 | End: 2017-03-28

## 2017-03-28 RX ORDER — GLYCOPYRROLATE 0.2 MG/ML
INJECTION INTRAMUSCULAR; INTRAVENOUS
Status: DISCONTINUED | OUTPATIENT
Start: 2017-03-28 | End: 2017-03-28

## 2017-03-28 RX ORDER — HEPARIN SODIUM 5000 [USP'U]/ML
5000 INJECTION, SOLUTION INTRAVENOUS; SUBCUTANEOUS
Status: COMPLETED | OUTPATIENT
Start: 2017-03-28 | End: 2017-03-28

## 2017-03-28 RX ORDER — ALVIMOPAN 12 MG/1
12 CAPSULE ORAL 2 TIMES DAILY
Status: DISCONTINUED | OUTPATIENT
Start: 2017-03-28 | End: 2017-03-30 | Stop reason: HOSPADM

## 2017-03-28 RX ORDER — ONDANSETRON 2 MG/ML
4 INJECTION INTRAMUSCULAR; INTRAVENOUS ONCE AS NEEDED
Status: DISCONTINUED | OUTPATIENT
Start: 2017-03-28 | End: 2017-03-28 | Stop reason: HOSPADM

## 2017-03-28 RX ORDER — LIDOCAINE HYDROCHLORIDE ANHYDROUS AND DEXTROSE MONOHYDRATE .8; 5 G/100ML; G/100ML
INJECTION, SOLUTION INTRAVENOUS CONTINUOUS PRN
Status: DISCONTINUED | OUTPATIENT
Start: 2017-03-28 | End: 2017-03-28

## 2017-03-28 RX ORDER — ONDANSETRON 2 MG/ML
INJECTION INTRAMUSCULAR; INTRAVENOUS
Status: DISCONTINUED | OUTPATIENT
Start: 2017-03-28 | End: 2017-03-28

## 2017-03-28 RX ORDER — DEXAMETHASONE SODIUM PHOSPHATE 4 MG/ML
INJECTION, SOLUTION INTRA-ARTICULAR; INTRALESIONAL; INTRAMUSCULAR; INTRAVENOUS; SOFT TISSUE
Status: DISCONTINUED | OUTPATIENT
Start: 2017-03-28 | End: 2017-03-28

## 2017-03-28 RX ORDER — PROPOFOL 10 MG/ML
VIAL (ML) INTRAVENOUS
Status: DISCONTINUED | OUTPATIENT
Start: 2017-03-28 | End: 2017-03-28

## 2017-03-28 RX ORDER — HYDROCODONE BITARTRATE AND ACETAMINOPHEN 5; 325 MG/1; MG/1
1 TABLET ORAL EVERY 4 HOURS PRN
Status: DISCONTINUED | OUTPATIENT
Start: 2017-03-28 | End: 2017-03-29

## 2017-03-28 RX ORDER — ONDANSETRON 2 MG/ML
4 INJECTION INTRAMUSCULAR; INTRAVENOUS EVERY 12 HOURS PRN
Status: DISCONTINUED | OUTPATIENT
Start: 2017-03-28 | End: 2017-03-30 | Stop reason: HOSPADM

## 2017-03-28 RX ORDER — SODIUM CHLORIDE, SODIUM LACTATE, POTASSIUM CHLORIDE, CALCIUM CHLORIDE 600; 310; 30; 20 MG/100ML; MG/100ML; MG/100ML; MG/100ML
INJECTION, SOLUTION INTRAVENOUS CONTINUOUS
Status: DISCONTINUED | OUTPATIENT
Start: 2017-03-28 | End: 2017-03-30

## 2017-03-28 RX ORDER — SODIUM CHLORIDE 0.9 % (FLUSH) 0.9 %
3 SYRINGE (ML) INJECTION
Status: DISCONTINUED | OUTPATIENT
Start: 2017-03-28 | End: 2017-03-28

## 2017-03-28 RX ORDER — SODIUM CHLORIDE 0.9 % (FLUSH) 0.9 %
3 SYRINGE (ML) INJECTION
Status: DISCONTINUED | OUTPATIENT
Start: 2017-03-28 | End: 2017-03-28 | Stop reason: HOSPADM

## 2017-03-28 RX ORDER — FENTANYL CITRATE 50 UG/ML
25 INJECTION, SOLUTION INTRAMUSCULAR; INTRAVENOUS EVERY 5 MIN PRN
Status: COMPLETED | OUTPATIENT
Start: 2017-03-28 | End: 2017-03-28

## 2017-03-28 RX ORDER — LIDOCAINE HCL/PF 100 MG/5ML
SYRINGE (ML) INTRAVENOUS
Status: DISCONTINUED | OUTPATIENT
Start: 2017-03-28 | End: 2017-03-28

## 2017-03-28 RX ORDER — SODIUM CHLORIDE 9 MG/ML
INJECTION, SOLUTION INTRAVENOUS CONTINUOUS
Status: DISCONTINUED | OUTPATIENT
Start: 2017-03-28 | End: 2017-03-28

## 2017-03-28 RX ORDER — ROCURONIUM BROMIDE 10 MG/ML
INJECTION, SOLUTION INTRAVENOUS
Status: DISCONTINUED | OUTPATIENT
Start: 2017-03-28 | End: 2017-03-28

## 2017-03-28 RX ADMIN — PHENYLEPHRINE HYDROCHLORIDE 100 MCG: 10 INJECTION INTRAVENOUS at 09:03

## 2017-03-28 RX ADMIN — ACETAMINOPHEN 1000 MG: 10 INJECTION, SOLUTION INTRAVENOUS at 01:03

## 2017-03-28 RX ADMIN — FENTANYL CITRATE 25 MCG: 50 INJECTION INTRAMUSCULAR; INTRAVENOUS at 11:03

## 2017-03-28 RX ADMIN — SODIUM CHLORIDE, SODIUM GLUCONATE, SODIUM ACETATE, POTASSIUM CHLORIDE, MAGNESIUM CHLORIDE, SODIUM PHOSPHATE, DIBASIC, AND POTASSIUM PHOSPHATE: .53; .5; .37; .037; .03; .012; .00082 INJECTION, SOLUTION INTRAVENOUS at 08:03

## 2017-03-28 RX ADMIN — HYDROCODONE BITARTRATE AND ACETAMINOPHEN 1 TABLET: 5; 325 TABLET ORAL at 10:03

## 2017-03-28 RX ADMIN — METRONIDAZOLE 500 MG: 500 SOLUTION INTRAVENOUS at 07:03

## 2017-03-28 RX ADMIN — ONDANSETRON 4 MG: 2 INJECTION INTRAMUSCULAR; INTRAVENOUS at 11:03

## 2017-03-28 RX ADMIN — ONDANSETRON 4 MG: 2 INJECTION INTRAMUSCULAR; INTRAVENOUS at 09:03

## 2017-03-28 RX ADMIN — PROPOFOL 130 MG: 10 INJECTION, EMULSION INTRAVENOUS at 07:03

## 2017-03-28 RX ADMIN — SODIUM CHLORIDE, SODIUM LACTATE, POTASSIUM CHLORIDE, AND CALCIUM CHLORIDE: .6; .31; .03; .02 INJECTION, SOLUTION INTRAVENOUS at 11:03

## 2017-03-28 RX ADMIN — FENTANYL CITRATE 25 MCG: 50 INJECTION INTRAMUSCULAR; INTRAVENOUS at 10:03

## 2017-03-28 RX ADMIN — DEXAMETHASONE SODIUM PHOSPHATE 8 MG: 4 INJECTION, SOLUTION INTRAMUSCULAR; INTRAVENOUS at 07:03

## 2017-03-28 RX ADMIN — PHENYLEPHRINE HYDROCHLORIDE 100 MCG: 10 INJECTION INTRAVENOUS at 08:03

## 2017-03-28 RX ADMIN — NEOSTIGMINE METHYLSULFATE 5 MG: 1 INJECTION INTRAVENOUS at 09:03

## 2017-03-28 RX ADMIN — LIDOCAINE HYDROCHLORIDE 2 MG: 10 INJECTION, SOLUTION EPIDURAL; INFILTRATION; INTRACAUDAL; PERINEURAL at 06:03

## 2017-03-28 RX ADMIN — DIPHENHYDRAMINE HYDROCHLORIDE 12.5 MG: 50 INJECTION, SOLUTION INTRAMUSCULAR; INTRAVENOUS at 10:03

## 2017-03-28 RX ADMIN — Medication 30 MG: at 07:03

## 2017-03-28 RX ADMIN — ACETAMINOPHEN 1000 MG: 10 INJECTION, SOLUTION INTRAVENOUS at 06:03

## 2017-03-28 RX ADMIN — PHENYLEPHRINE HYDROCHLORIDE 100 MCG: 10 INJECTION INTRAVENOUS at 07:03

## 2017-03-28 RX ADMIN — CEFTRIAXONE 2 G: 2 INJECTION, SOLUTION INTRAVENOUS at 07:03

## 2017-03-28 RX ADMIN — DEXMEDETOMIDINE HYDROCHLORIDE 0.5 MCG/KG/HR: 100 INJECTION, SOLUTION, CONCENTRATE INTRAVENOUS at 07:03

## 2017-03-28 RX ADMIN — GLYCOPYRROLATE 0.6 MG: 0.2 INJECTION, SOLUTION INTRAMUSCULAR; INTRAVENOUS at 09:03

## 2017-03-28 RX ADMIN — DIPHENHYDRAMINE HYDROCHLORIDE 12.5 MG: 50 INJECTION, SOLUTION INTRAMUSCULAR; INTRAVENOUS at 04:03

## 2017-03-28 RX ADMIN — ACETAMINOPHEN 1000 MG: 10 INJECTION, SOLUTION INTRAVENOUS at 09:03

## 2017-03-28 RX ADMIN — ROCURONIUM BROMIDE 40 MG: 10 INJECTION, SOLUTION INTRAVENOUS at 07:03

## 2017-03-28 RX ADMIN — MIDAZOLAM HYDROCHLORIDE 2 MG: 1 INJECTION, SOLUTION INTRAMUSCULAR; INTRAVENOUS at 07:03

## 2017-03-28 RX ADMIN — HYDROMORPHONE HYDROCHLORIDE 1 MG: 1 INJECTION, SOLUTION INTRAMUSCULAR; INTRAVENOUS; SUBCUTANEOUS at 01:03

## 2017-03-28 RX ADMIN — SODIUM CHLORIDE: 0.9 INJECTION, SOLUTION INTRAVENOUS at 06:03

## 2017-03-28 RX ADMIN — IBUPROFEN 400 MG: 100 INJECTION INTRAVENOUS at 06:03

## 2017-03-28 RX ADMIN — Medication 10 MG: at 08:03

## 2017-03-28 RX ADMIN — IBUPROFEN 800 MG: 800 INJECTION INTRAVENOUS at 09:03

## 2017-03-28 RX ADMIN — LIDOCAINE HYDROCHLORIDE 4 MG/MIN: 8 INJECTION, SOLUTION INTRAVENOUS at 07:03

## 2017-03-28 RX ADMIN — LIDOCAINE HYDROCHLORIDE 50 MG: 20 INJECTION, SOLUTION INTRAVENOUS at 07:03

## 2017-03-28 RX ADMIN — HYDROCODONE BITARTRATE AND ACETAMINOPHEN 1 TABLET: 5; 325 TABLET ORAL at 02:03

## 2017-03-28 RX ADMIN — SODIUM CHLORIDE, SODIUM LACTATE, POTASSIUM CHLORIDE, AND CALCIUM CHLORIDE: .6; .31; .03; .02 INJECTION, SOLUTION INTRAVENOUS at 10:03

## 2017-03-28 RX ADMIN — SODIUM CHLORIDE, SODIUM GLUCONATE, SODIUM ACETATE, POTASSIUM CHLORIDE, MAGNESIUM CHLORIDE, SODIUM PHOSPHATE, DIBASIC, AND POTASSIUM PHOSPHATE: .53; .5; .37; .037; .03; .012; .00082 INJECTION, SOLUTION INTRAVENOUS at 07:03

## 2017-03-28 RX ADMIN — HYDROMORPHONE HYDROCHLORIDE 1 MG: 1 INJECTION, SOLUTION INTRAMUSCULAR; INTRAVENOUS; SUBCUTANEOUS at 11:03

## 2017-03-28 RX ADMIN — HEPARIN SODIUM 5000 UNITS: 5000 INJECTION, SOLUTION INTRAVENOUS; SUBCUTANEOUS at 06:03

## 2017-03-28 RX ADMIN — IBUPROFEN 800 MG: 800 INJECTION INTRAVENOUS at 02:03

## 2017-03-28 RX ADMIN — ALVIMOPAN 12 MG: 12 CAPSULE ORAL at 09:03

## 2017-03-28 RX ADMIN — ALVIMOPAN 12 MG: 12 CAPSULE ORAL at 06:03

## 2017-03-28 RX ADMIN — HYDROMORPHONE HYDROCHLORIDE 1 MG: 1 INJECTION, SOLUTION INTRAMUSCULAR; INTRAVENOUS; SUBCUTANEOUS at 07:03

## 2017-03-28 NOTE — TRANSFER OF CARE
"Anesthesia Transfer of Care Note    Patient: Carmen Gray    Procedure(s) Performed: Procedure(s) (LRB):  SIGMOIDECTOMY-LAPAROSCOPIC (N/A)    Patient location: PACU    Anesthesia Type: general    Transport from OR: Transported from OR on 6-10 L/min O2 by face mask with adequate spontaneous ventilation    Post pain: adequate analgesia    Post assessment: no apparent anesthetic complications    Post vital signs: stable    Level of consciousness: awake and alert    Nausea/Vomiting: no nausea/vomiting    Complications: none          Last vitals:   Visit Vitals    /62 (BP Location: Right arm, Patient Position: Lying, BP Method: Automatic)    Pulse 61    Temp 36.9 °C (98.5 °F) (Oral)    Resp 16    Ht 5' 2" (1.575 m)    Wt 55.8 kg (123 lb)    SpO2 100%    Breastfeeding No    BMI 22.5 kg/m2     "

## 2017-03-28 NOTE — PLAN OF CARE
VSS. Respirations even and unlabored. Pt reports pain is tolerable. Plan of care discussed with patient. Pt verbalized understanding.

## 2017-03-28 NOTE — OP NOTE
[facility]#  [id number]    Gordon An MD  Colon and Rectal Surgery Fellow  [phone]

## 2017-03-28 NOTE — INTERVAL H&P NOTE
The patient has been examined and the H&P has been reviewed:    I concur with the findings and no changes have occurred since H&P was written.  Anesthesia/Surgery risks, benefits and alternative options discussed and understood by patient/family.          Active Hospital Problems    Diagnosis  POA    Colon polyp [K63.5]  Yes      Resolved Hospital Problems    Diagnosis Date Resolved POA   No resolved problems to display.

## 2017-03-28 NOTE — BRIEF OP NOTE
[facility]  Brief Operative Note    SUMMARY     Surgery Date: 3/28/2017     Surgeon(s) and Role:     * Gordon An MD - Fellow     * Polo Abel MD - Primary    Assisting Surgeon: None    Pre-Operative Care:  Pre-operative bowel prep Mechanical and PO antibiotics  IV antibiotics given within 1 hour of incision? Yes  Preoperative multimodal pain control? Orfirmev, Calador and TAP    Pre-op Diagnosis:  Polyp of colon, unspecified part of colon, unspecified type [K63.5]    Operative Care:  Post-op Diagnosis: Post-Op Diagnosis Codes:     * Polyp of colon, unspecified part of colon, unspecified type [K63.5]    Procedure(s) (LRB):  SIGMOIDECTOMY-LAPAROSCOPIC (N/A)    Anesthesia: General  Total volume administered 2.5L   Total UOP: 65    Technical Procedures Used: Laparoscopic Sigmoidectomy    Use of closing instrument setup? Yes  Gown/Glove Change @ time of closing? Yes  Suction tip change at time of closing? Yes  Wound Protector used if open case? Yes    Description of the findings of the procedure: Redundant sigmoid colon, divided below tattoo distally, negative leak test   Wound Class (Clean-contaminated    Complications: No     Estimated Blood Loss: 20          Specimens:   Specimen (12h ago through future)    Start     Ordered    03/28/17 0956  Specimen to Pathology - Surgery  Once     Comments:  Specimen to Pathology: 1) sigmoid colon   - permanent , no preservatives , to refrigerator  2) proximal anastomotic ring  - permanent , formalin , to refrigerator  3) distal anastomotic ring  - permanent , formalin , to refrigerator    03/28/17 0956    03/28/17 0935  Specimen to Pathology - Surgery  Once     Comments:  1) sigmoid colon - Permanent  2) proximal anastamotic ring - Permanent  3) distal anastamotic ring - Permanent    03/28/17 0937          Implants: * No implants in log *    Post-Operative Care:         Disposition: PACU - hemodynamically stable.           Condition: Good  PT Temp >36  upon leaving the OR? Yes    Gordon An MD  Colon and Rectal Surgery Fellow  [phone]

## 2017-03-28 NOTE — PROGRESS NOTES
Notified Verna Saenz, and anesthesia regarding pt fractured left wrist and left thumb in splint now that has metal in splint; anesthesia requesting limb alert bracelet be placed on left arm; limb alert bracelet placed. Dr An stated splint can stay on left wrist for now.

## 2017-03-28 NOTE — ANESTHESIA POSTPROCEDURE EVALUATION
"Anesthesia Post Evaluation    Patient: Carmen rGay    Procedure(s) Performed: Procedure(s) (LRB):  SIGMOIDECTOMY-LAPAROSCOPIC (N/A)    Final Anesthesia Type: general  Patient location during evaluation: PACU  Patient participation: Yes- Able to Participate  Level of consciousness: awake and alert  Post-procedure vital signs: reviewed and stable  Pain management: adequate  Airway patency: patent  PONV status at discharge: No PONV  Anesthetic complications: no      Cardiovascular status: blood pressure returned to baseline  Respiratory status: unassisted  Hydration status: euvolemic  Follow-up not needed.        Visit Vitals    /78    Pulse (!) 56    Temp 36.1 °C (97 °F) (Temporal)    Resp 16    Ht 5' 2" (1.575 m)    Wt 55.8 kg (123 lb)    SpO2 100%    Breastfeeding No    BMI 22.5 kg/m2       Pain/Rafal Score: Pain Assessment Performed: Yes (3/28/2017 11:30 AM)  Presence of Pain: complains of pain/discomfort (3/28/2017 11:30 AM)  Pain Rating Prior to Med Admin: 5 (3/28/2017 11:05 AM)  Rafal Score: 9 (3/28/2017 11:30 AM)      "

## 2017-03-28 NOTE — IP AVS SNAPSHOT
[facility]  [address]  [facility] [address]  Phone: [phone]           Patient Discharge Instructions   Our goal is to set you up for success. This packet includes information on your condition, medications, and your home care.  It will help you care for yourself to prevent having to return to the hospital.     Please ask your nurse if you have any questions.      There are many details to remember when preparing to leave the hospital. Here is what you will need to do:    1. Take your medicine. If you are prescribed medications, review your Medication List on the following pages. You may have new medications to  at the pharmacy and others that you'll need to stop taking. Review the instructions for how and when to take your medications. Talk with your doctor or nurses if you are unsure of what to do.     2. Go to your follow-up appointments. Specific follow-up information is listed in the following pages. Your may be contacted by a nurse or clinical provider about future appointments. Be sure we have all of the phone numbers to reach you. Please contact your provider's office if you are unable to make an appointment.     3. Watch for warning signs. Your doctor or nurse will give you detailed warning signs to watch for and when to call for assistance. These instructions may also include educational information about your condition. If you experience any of warning signs to your health, call your doctor.           [facility]  Unless otherwise directed by your provider, please   contact [facility], our nurse care line   that is available for 24/7 assistance.     [phone] (toll-free)     Registered nurses in the [facility]   provide: appointment scheduling, clinical advisement, health education, and other advisory services.                  ** Verify the list of medication(s) below is accurate and up to date. Carry this with you in case of  emergency. If your medications have changed, please notify your healthcare provider.             Medication List      START taking these medications        Additional Info                      HYDROmorphone 2 MG tablet   Commonly known as:  DILAUDID   Quantity:  30 tablet   Refills:  0   Dose:  2 mg    Last time this was given:  2 mg on 3/30/2017  9:02 AM   Instructions:  Take 1 tablet (2 mg total) by mouth every 4 (four) hours as needed for Pain.     Begin Date    AM    Noon    PM    Bedtime       ibuprofen 800 MG tablet   Commonly known as:  ADVIL,MOTRIN   Quantity:  30 tablet   Refills:  0   Dose:  800 mg    Instructions:  Take 1 tablet (800 mg total) by mouth every 6 (six) hours as needed for Pain.     Begin Date    AM    Noon    PM    Bedtime       tramadol 50 mg tablet   Commonly known as:  ULTRAM   Quantity:  60 tablet   Refills:  0   Dose:  100 mg    Last time this was given:  100 mg on 3/30/2017  6:42 AM   Instructions:  Take 2 tablets (100 mg total) by mouth every 6 (six) hours as needed for Pain.     Begin Date    AM    Noon    PM    Bedtime         CONTINUE taking these medications        Additional Info                      gabapentin 300 MG capsule   Commonly known as:  NEURONTIN   Quantity:  30 capsule   Refills:  2   Dose:  300 mg    Instructions:  Take 1 capsule (300 mg total) by mouth every evening.     Begin Date    AM    Noon    PM    Bedtime       RESTASIS 0.05 % ophthalmic emulsion   Refills:  0   Dose:  1 drop   Generic drug:  cycloSPORINE    Instructions:  Place 1 drop into both eyes every evening.     Begin Date    AM    Noon    PM    Bedtime       varenicline 0.5 mg (11)- 1 mg (42) tablet   Commonly known as:  CHANTIX STARTING MONTH PAK   Quantity:  1 Package   Refills:  1    Instructions:  Take one 0.5mg tab by mouth once daily X3 days,then increase to one 0.5mg tab twice daily X4 days,then increase to one 1mg tab twice daily     Begin Date    AM    Noon    PM    Bedtime         STOP  taking these medications     metronidazole 500 MG tablet   Commonly known as:  FLAGYL       neomycin 500 mg Tab   Commonly known as:  MYCIFRADIN            Where to Get Your Medications      You can get these medications from any pharmacy     Bring a paper prescription for each of these medications     HYDROmorphone 2 MG tablet    ibuprofen 800 MG tablet    tramadol 50 mg tablet                  Please bring to all follow up appointments:    1. A copy of your discharge instructions.  2. All medicines you are currently taking in their original bottles.  3. Identification and insurance card.    Please arrive 15 minutes ahead of scheduled appointment time.    Please call 24 hours in advance if you must reschedule your appointment and/or time.        Follow-up Information     Follow up with Polo Abel MD In 2 weeks.    Specialty:  Colon and Rectal Surgery    Contact information:    [address] [place] [address]  [phone]          Discharge Instructions     Future Orders    Call MD for:  persistent nausea and vomiting or diarrhea     Call MD for:  severe uncontrolled pain     Call MD for:  temperature >100.4     Diet general     Comments:    Low Fiber/ Low Residue Diet for 4-5 days then advance as tolerated.    Questions:    Total calories:      Fat restriction, if any:      Protein restriction, if any:      Na restriction, if any:      Fluid restriction:      Additional restrictions:      Other restrictions (specify):     Comments:    No lifting greater than 5lbs        Primary Diagnosis     Your primary diagnosis was:  Colon Polyp      Admission Information     Date & Time Provider Department [facility]    3/28/2017  5:19 AM Polo Abel MD [facility] [id number]      Care Providers     Provider Role Specialty Primary office phone    Polo Abel MD Attending Provider Colon and Rectal Surgery [phone]    Polo Abel MD Surgeon  Colon and Rectal Surgery  "[phone]      Your Vitals Were     BP Pulse Temp Resp Height Weight    115/80 72 98.3 °F (36.8 °C) (Oral) 20 5' 2" (1.575 m) 55.8 kg (123 lb)    SpO2 BMI             95% 22.5 kg/m2         Recent Lab Values        1/5/2015 1/24/2017                        1:55 PM  7:51 AM          A1C 5.4 5.3          Comment for A1C at  7:51 AM on 1/24/2017:  According to ADA guidelines, hemoglobin A1C <7.0% represents  optimal control in non-pregnant diabetic patients.  Different  metrics may apply to specific populations.   Standards of Medical Care in Diabetes - 2016.  For the purpose of screening for the presence of diabetes:  <5.7%     Consistent with the absence of diabetes  5.7-6.4%  Consistent with increasing risk for diabetes   (prediabetes)  >or=6.5%  Consistent with diabetes  Currently no consensus exists for use of hemoglobin A1C  for diagnosis of diabetes for children.        Pending Labs     Order Current Status    Specimen to Pathology - Surgery Collected (03/28/17 0937)    Type & Screen Collected (03/28/17 0541)    Specimen to Pathology - Surgery In process      Allergies as of 3/30/2017        Reactions    Codeine Itching    Hydrocodone Itching      Advance Directives     An advance directive is a document which, in the event you are no longer able to make decisions for yourself, tells your healthcare team what kind of treatment you do or do not want to receive, or who you would like to make those decisions for you.  If you do not currently have an advance directive, [facility] encourages you to create one.  For more information call:  [phone]), [phone] ([phone]),  or log on to [email or web address].        Smoking Cessation     If you would like to quit smoking:   You may be eligible for free services if you are a [place] resident and started smoking cigarettes before September 1, 1988.  Call the Smoking Cessation Trust (SCT) toll free at (340) 057-6202 or (202) " 959-6892.   Call 1-800-QUIT-NOW if you do not meet the above criteria.   Contact us via email: [email or web address]   View our website for more information: [email or web address]        Language Assistance Services     ATTENTION: Language assistance services are available, free of charge. Please call [phone].      ATENCIÓN: Si habla español, tiene a chin disposición servicios gratuitos de asistencia lingüística. Llame al [phone].     CHÚ Ý: N?u b?n nói Ti?ng Vi?t, có các d?ch v? h? tr? ngôn ng? mi?n phí dành cho b?n. G?i s? [phone].         [facility] complies with applicable Federal civil rights laws and does not discriminate on the basis of race, color, national origin, age, disability, or sex.

## 2017-03-28 NOTE — NURSING TRANSFER
Nursing Transfer Note      3/28/2017     Transfer From: PACU    Transfer via stretcher    Transfer with IVF,s, Doty    Transported by PCT    Medicines sent: N/A    Chart send with patient: Yes    Notified:     Patient reassessed at: 3/28/17 1449    Upon arrival to floor: patient oriented to room, call bell in reach and bed in lowest position

## 2017-03-28 NOTE — NURSING TRANSFER
Nursing Transfer Note      3/28/2017     Transfer To: 654    Transfer via stretcher    Transfer with ivf    Transported by pct    Medicines sent: yes    Chart send with patient: Yes    Notified: boyfriend

## 2017-03-28 NOTE — OP NOTE
DATE OF PROCEDURE:  03/28/2017.    ATTENDING SURGEON OF RECORD:  Polo Abel M.D.    FELLOW OF RECORD:  Gordon An M.D. (RES).    PREOPERATIVE DIAGNOSIS:  Colon polyp in the sigmoid colon.    POSTOPERATIVE DIAGNOSIS:  Colon polyp in the sigmoid colon.    PROCEDURE PERFORMED:  Laparoscopic sigmoidectomy.    INTRAVENOUS FLUIDS:  The patient had 2.5 liters of IV fluids administered.    URINE OUTPUT:  65 mL.    TECHNICAL PROCEDURE PERFORMED:  Laparoscopic sigmoidectomy.    OPERATIVE FINDINGS:  A redundant sigmoid was appreciated.  The WILBERT was divided   at its origin.  The colon was divided below the tattoo distally and there was a   negative leak test after the anastomosis was created.    COMPLICATIONS:  None.    ESTIMATED BLOOD LOSS:  20 mL.    SPECIMENS:  Three specimens sent to the Pathologist.  1.  Sigmoid colon.  2.  Proximal anastomotic ring.  3.  Distal anastomotic ring.    INDICATIONS FOR PROCEDURE:  This is a 51-year-old female who underwent a   screening colonoscopy in February and was found to have two flat polyps in the   ascending colon, measuring 7 and 12 mm.  Path returned with tubular adenomas and   a hyperplastic polyp for those two.  There was a sigmoid colon polyp as well.    This was noted to be an inflammatory polyp, noted at approximately 21 cm.  Path   returned as invasive adenocarcinoma with a positive margin.  Metastatic workup   was negative.  A flexible sigmoidoscopy was performed in the clinic and found   the residual polyp at 22 cm and a tattoo was placed, and she was recommended to   undergo a laparoscopic sigmoidectomy.    DESCRIPTION OF PROCEDURE:  The patient was met in the preoperative holding area   where her consent and site verification forms were reviewed.  Questions were   answered.  Risks and benefits of this procedure were explained to include   bleeding, infection, damage to surrounding structures, need for repeat   procedures as well as risks of anesthesia.  She agreed  and wished to proceed.    She was taken to the Operating Room and placed on the operating room table in   the lithotomy position where general anesthesia was induced.  A Doty catheter   was placed, rectal washout was performed, and an operative timeout was then   performed after she had been prepped and draped in the usual sterile fashion.    We then began the procedure with a supraumbilical incision, placing the balloon   trocar under direct vision into the abdomen and insufflating the abdomen.  We   placed a 12 mm trocar in the right lower quadrant and a 12-mm trocar in the   right upper quadrant.  We instilled local anesthetic in the transverse abdominis   plane as a TAP block and placed the 5 mm trocar in the subxiphoid region, just   to the left of the falciform.  We began our dissection distally along the   mesentery of the sigmoid colon by dividing the peritoneum at the sacral   promontory and directing our dissection distally down into the pelvis.  We then   changed our direction cephalad and divided the peritoneum.  We then dissected   medially to laterally and identified the ureter and protected this throughout   the case.  We then directed our attention to the takeoff of the WILBERT at the   lateral border of the fourth portion of the duodenum.  We divided the peritoneum   under the IMV and began our medial to lateral dissection under the IMV.  We   were able to isolate the WILBERT at this point by cleaning off the peritoneal   attachments as it originates from the aorta and divided the WILBERT using the EnSeal   device.  We continued our dissection out laterally until we reached the lateral   abdominal wall and then divided the lateral attachments using Bovie   electrocautery.  Once we divided the lateral attachments, the sigmoid colon was   easily mobilized medially and again the ureter was identified and protected.  We   dissected down distally along the sigmoid colon until we reached the rectum and   this  concluded our lateral dissection.  We then divided the mesentery of the   sigmoid colon using the EnSeal device and determined an area distal to the   previously placed tattoo to transect our sigmoid colon.  We used a 60 mm blue   loaded stapler through the 12 mm trocar and divided the sigmoid colon with one   staple load of the green load stapler.  We then brought the colon out through   the midline incision after extending our camera port approximately 3 cm and   brought this end of the colon out and identified an area proximal to our   rectosigmoid transection to divide the colon.  We used another 60 mm blue loaded   stapler to divide the colon after dividing the mesentery up to that point of   the descending colon.  We then checked for a good blood supply by dividing the   marginal artery and there was good bleeding.  The bowel was divided and we then   divided the staple line from the proximal end of the colon using Bovie   electrocautery.  We placed a pursestring suture of 2-0 Prolene and placed the   anvil into the colon.  We secured the anvil by tying down the pursestring.  We   placed the descending colon back into the abdomen, reinsufflated, and Dr. Abel then proceeded down below and placed a circular stapler through the anus   and into the residual rectum and deployed the spike.  The anvil was  to   the spike and the anastomosis was created.  An air leak test was performed using   the colonoscope and there were no bubbles appreciated and the anastomosis was   intact via the colonoscopic image.  We then aspirated any residual fluid and   concluded that portion of the procedure.  We then removed all trocars under   direct vision.  There was no bleeding.  We closed the midline fascia with a #1   PDS suture in a running fashion and closed the skin with running 4-0 Monocryl   sutures for all small incisions.  This concluded our procedure.  The patient   Tolerated the procedure well and went to the  Postanesthesia Care Unit in stable condition. Dr. Polo Abel was present for the entire procedure.      DICTATED BY:  Gordon An M.D. (RES).      DEVIKA/PHYLLIS  dd: 03/28/2017 10:25:03 (CDT)  td: 03/28/2017 14:33:00 (CDT)  Doc ID   #[id number]  Job ID #[id number]    CC:

## 2017-03-28 NOTE — H&P (VIEW-ONLY)
Subjective:        Patient ID: Carmen Gray is a 51 y.o. female.     Chief Complaint: Colon Cancer     HPI New pt. Underwent screening colonoscopy on 2/ 7/2017 - ([facility] Trotter); 2 flat polyps in ascending colon, 7 and 12mminf -path tubular adenoma and hyperplastic. Another sigmoid colon polyp - Inflammatory polyp - and a polypoid lesion at about 21 cm - path invasive cancer with positive margin.  No lower GI symptoms. CEA = 2.4.  CT chest /abd/ pelvis - no metastatic dz.  FFS by me at last clinic visit - residual at 22cm, additional tattoo placed.           Past Medical History   Diagnosis Date    Anxiety      Depression      Insomnia               Past Surgical History   Procedure Laterality Date    Tubal ligation        Gastric sleeve   2015    Cholecystectomy        Hernia repair           hiatal     Colonoscopy N/A 2/7/2017       Procedure: COLONOSCOPY split; Surgeon: Dallas Mar MD; Location: [facility]; Service: Endoscopy; Laterality: N/A;         Review of Systems   Constitutional: Negative for chills and fever.   Respiratory: Negative for cough and shortness of breath.   Cardiovascular: Negative for chest pain and palpitations.   Genitourinary: Negative for dysuria and urgency.   Neurological: Negative for seizures and numbness.   Psychiatric/Behavioral: Negative for agitation and behavioral problems.       Objective:      Physical Exam   Constitutional: She is oriented to person, place, and time. She appears well-developed and well-nourished.   Eyes: Conjunctivae and EOM are normal.   Pulmonary/Chest: Effort normal. No respiratory distress.   Abdominal: Soft. She exhibits no distension.   Genitourinary:   Genitourinary Comments: Anorectal: normal tone   Musculoskeletal: Normal range of motion. She exhibits no edema.   Neurological: She is alert and oriented to person, place, and time.   Skin: Skin is warm and dry.   Psychiatric: She has a normal mood and affect. Her behavior  is normal.         Assessment:       1. Cancer of rectosigmoid (colon)        Plan:     Lap assisted sigmoid colectomy.  I have explained the risks, benefits, and alternatives of the procedure in detail.  The patient voices understanding and all questions have been answered. The patient agrees to proceed as planned.    Preop teaching done to include:    Mechanical bowel prep instruction sheet provided which includes instructions for prep and pre-op abx schedule, NPO after MN, and Hibiclens baths prior to surgery.     Details of planned surgery reviewed:  · Review of procedure  · Expected LOS.   · Preop process- application of DAVID hose and SCD's, IV and IVF  · Different methods of post op pain control (IV and oral)   · Use of and importance of IS and other prophylaxis  · Expected early ambulation  · Slow advancement of diet  · Ostomy and ostomy care if indicated   · Pathology report generally takes 5 or more working days    Goals that need to be met before discharge:  · No fever  · Functional status at baseline   · Tolerating low fiber diet   · Positive bowel function  · Adequate pain control with oral meds  · Vital signs and labs stable    Home instructions begun during this preop visit:   · May shower (no tub bath),   · No heavy lifting, nothing greater then 5 pounds,  · Small frequent meals, low residue,   · Ostomy care if needed,   · Call for fever above 101, nausea/vomiting, no bm and any increase in pain     All questions answered to pt and family satisfaction.   Polo Abel

## 2017-03-29 LAB
ANION GAP SERPL CALC-SCNC: 7 MMOL/L
BASOPHILS # BLD AUTO: 0.01 K/UL
BASOPHILS NFR BLD: 0.1 %
BUN SERPL-MCNC: 7 MG/DL
CALCIUM SERPL-MCNC: 8.3 MG/DL
CHLORIDE SERPL-SCNC: 107 MMOL/L
CO2 SERPL-SCNC: 24 MMOL/L
CREAT SERPL-MCNC: 0.6 MG/DL
DIFFERENTIAL METHOD: ABNORMAL
EOSINOPHIL # BLD AUTO: 0 K/UL
EOSINOPHIL NFR BLD: 0.1 %
ERYTHROCYTE [DISTWIDTH] IN BLOOD BY AUTOMATED COUNT: 13.1 %
EST. GFR  (AFRICAN AMERICAN): >60 ML/MIN/1.73 M^2
EST. GFR  (NON AFRICAN AMERICAN): >60 ML/MIN/1.73 M^2
GLUCOSE SERPL-MCNC: 97 MG/DL
HCT VFR BLD AUTO: 31 %
HGB BLD-MCNC: 10.6 G/DL
LYMPHOCYTES # BLD AUTO: 3.3 K/UL
LYMPHOCYTES NFR BLD: 29.3 %
MCH RBC QN AUTO: 31.6 PG
MCHC RBC AUTO-ENTMCNC: 34.2 %
MCV RBC AUTO: 93 FL
MONOCYTES # BLD AUTO: 0.7 K/UL
MONOCYTES NFR BLD: 6.4 %
NEUTROPHILS # BLD AUTO: 7.2 K/UL
NEUTROPHILS NFR BLD: 63.8 %
PLATELET # BLD AUTO: 184 K/UL
PMV BLD AUTO: 10.6 FL
POTASSIUM SERPL-SCNC: 4.2 MMOL/L
RBC # BLD AUTO: 3.35 M/UL
SODIUM SERPL-SCNC: 138 MMOL/L
WBC # BLD AUTO: 11.28 K/UL

## 2017-03-29 PROCEDURE — 63600175 PHARM REV CODE 636 W HCPCS: Performed by: SURGERY

## 2017-03-29 PROCEDURE — C9113 INJ PANTOPRAZOLE SODIUM, VIA: HCPCS | Performed by: STUDENT IN AN ORGANIZED HEALTH CARE EDUCATION/TRAINING PROGRAM

## 2017-03-29 PROCEDURE — 85025 COMPLETE CBC W/AUTO DIFF WBC: CPT

## 2017-03-29 PROCEDURE — 27201037 HC PRESSURE MONITORING SET UP

## 2017-03-29 PROCEDURE — 25000003 PHARM REV CODE 250: Performed by: SURGERY

## 2017-03-29 PROCEDURE — 20600001 HC STEP DOWN PRIVATE ROOM

## 2017-03-29 PROCEDURE — 25000003 PHARM REV CODE 250: Performed by: NURSE PRACTITIONER

## 2017-03-29 PROCEDURE — 80048 BASIC METABOLIC PNL TOTAL CA: CPT

## 2017-03-29 PROCEDURE — 63600175 PHARM REV CODE 636 W HCPCS: Performed by: STUDENT IN AN ORGANIZED HEALTH CARE EDUCATION/TRAINING PROGRAM

## 2017-03-29 PROCEDURE — 36415 COLL VENOUS BLD VENIPUNCTURE: CPT

## 2017-03-29 RX ORDER — TRAMADOL HYDROCHLORIDE 50 MG/1
50 TABLET ORAL EVERY 4 HOURS PRN
Status: DISCONTINUED | OUTPATIENT
Start: 2017-03-29 | End: 2017-03-29

## 2017-03-29 RX ORDER — PANTOPRAZOLE SODIUM 40 MG/10ML
40 INJECTION, POWDER, LYOPHILIZED, FOR SOLUTION INTRAVENOUS ONCE
Status: COMPLETED | OUTPATIENT
Start: 2017-03-29 | End: 2017-03-29

## 2017-03-29 RX ORDER — IBUPROFEN 200 MG
1 TABLET ORAL DAILY
Status: DISCONTINUED | OUTPATIENT
Start: 2017-03-29 | End: 2017-03-30 | Stop reason: HOSPADM

## 2017-03-29 RX ORDER — TRAMADOL HYDROCHLORIDE 50 MG/1
100 TABLET ORAL EVERY 6 HOURS PRN
Status: DISCONTINUED | OUTPATIENT
Start: 2017-03-29 | End: 2017-03-30 | Stop reason: HOSPADM

## 2017-03-29 RX ORDER — OXYCODONE HYDROCHLORIDE 5 MG/1
5 TABLET ORAL EVERY 4 HOURS PRN
Status: DISCONTINUED | OUTPATIENT
Start: 2017-03-29 | End: 2017-03-29

## 2017-03-29 RX ADMIN — IBUPROFEN 800 MG: 800 INJECTION INTRAVENOUS at 09:03

## 2017-03-29 RX ADMIN — IBUPROFEN 800 MG: 800 INJECTION INTRAVENOUS at 06:03

## 2017-03-29 RX ADMIN — SODIUM CHLORIDE, SODIUM LACTATE, POTASSIUM CHLORIDE, AND CALCIUM CHLORIDE: .6; .31; .03; .02 INJECTION, SOLUTION INTRAVENOUS at 08:03

## 2017-03-29 RX ADMIN — IBUPROFEN 800 MG: 800 INJECTION INTRAVENOUS at 02:03

## 2017-03-29 RX ADMIN — HYDROMORPHONE HYDROCHLORIDE 1 MG: 1 INJECTION, SOLUTION INTRAMUSCULAR; INTRAVENOUS; SUBCUTANEOUS at 08:03

## 2017-03-29 RX ADMIN — TRAMADOL HYDROCHLORIDE 100 MG: 50 TABLET, FILM COATED ORAL at 04:03

## 2017-03-29 RX ADMIN — ALVIMOPAN 12 MG: 12 CAPSULE ORAL at 09:03

## 2017-03-29 RX ADMIN — HYDROMORPHONE HYDROCHLORIDE 1 MG: 1 INJECTION, SOLUTION INTRAMUSCULAR; INTRAVENOUS; SUBCUTANEOUS at 04:03

## 2017-03-29 RX ADMIN — HYDROMORPHONE HYDROCHLORIDE 1 MG: 1 INJECTION, SOLUTION INTRAMUSCULAR; INTRAVENOUS; SUBCUTANEOUS at 10:03

## 2017-03-29 RX ADMIN — PANTOPRAZOLE SODIUM 40 MG: 40 INJECTION, POWDER, FOR SOLUTION INTRAVENOUS at 06:03

## 2017-03-29 RX ADMIN — NICOTINE 1 PATCH: 14 PATCH, EXTENDED RELEASE TRANSDERMAL at 12:03

## 2017-03-29 RX ADMIN — ALVIMOPAN 12 MG: 12 CAPSULE ORAL at 12:03

## 2017-03-29 RX ADMIN — HYDROMORPHONE HYDROCHLORIDE 1 MG: 1 INJECTION, SOLUTION INTRAMUSCULAR; INTRAVENOUS; SUBCUTANEOUS at 02:03

## 2017-03-29 RX ADMIN — DIPHENHYDRAMINE HYDROCHLORIDE 12.5 MG: 50 INJECTION, SOLUTION INTRAMUSCULAR; INTRAVENOUS at 04:03

## 2017-03-29 RX ADMIN — TRAMADOL HYDROCHLORIDE 50 MG: 50 TABLET, FILM COATED ORAL at 12:03

## 2017-03-29 NOTE — PLAN OF CARE
Pt is postop day #1, AA&Ox4, resting in bed,c/o itching, has received Benadryl.   obtained assessment information from patient.  Educated pt on goals of the day, pt voiced understanding.  Anticipated discharge plan is for home with family support.  CM will continue to follow.    Pt's PCP is Carmen Phan MD    [facility] [address] [place] [address] [place] [address] AT [facility] [address]  Phone: [phone] Fax: [phone]    Payor - Payor: [employer] / Plan: [facility] CHOICE PLUS / Product Type: Commercial /         03/29/17 0913   Discharge Assessment   Assessment Type Discharge Planning Assessment   Confirmed/corrected address and phone number on facesheet? Yes   Assessment information obtained from? Patient;Medical Record   Expected Length of Stay (days) 3   Prior to hospitilization cognitive status: Alert/Oriented   Prior to hospitalization functional status: Independent   Current cognitive status: Alert/Oriented   Current Functional Status: Independent   Arrived From home or self-care   Lives With significant other   Able to Return to Prior Arrangements yes   Is patient able to care for self after discharge? Yes   How many people do you have in your home that can help with your care after discharge? 1   Who are your caregiver(s) and their phone number(s)? SisterHerminio  [phone]  and Significant other, Troy Schumert [phone]   Patient's perception of discharge disposition home or selfcare   Readmission Within The Last 30 Days no previous admission in last 30 days   Patient currently receives home health services? No   Does the patient currently use HME? No   Equipment Currently Used at Home none   Do you have any problems affording any of your prescribed medications? No   Is the patient taking medications as prescribed? yes   Do you have any financial concerns preventing you from receiving the healthcare  you need? No   Does the patient have transportation to healthcare appointments? Yes   Transportation Available family or friend will provide   On Dialysis? No   Does the patient receive services at the Coumadin Clinic? No   Discharge Plan A Home with family   Discharge Plan B Home with family;Home Health   Patient/Family In Agreement With Plan yes

## 2017-03-29 NOTE — PROGRESS NOTES
COLON RECTAL SURGERY  PROGRESS NOTE    LENGTH OF STAY: 1  POST OPERATIVE DAY: 1 Day Post-Op for Procedure(s) (LRB):  SIGMOIDECTOMY-LAPAROSCOPIC (N/A)     Subjective     Daily HPI: No acute events overnight, pain minimal, passing flatus, tolerating clears and advanced this afternoon to low residue, no nausea or vomiting. Ambulating.  No fevers      Objective     Vitals:    03/29/17 1643   BP: 112/64   Pulse: 72   Resp: 16   Temp: 97.7 °F (36.5 °C)         Intake/Output Summary (Last 24 hours) at 03/29/17 1708  Last data filed at 03/29/17 1446   Gross per 24 hour   Intake           2381.5 ml   Output             3550 ml   Net          -1168.5 ml       General:  female in nad  Neuro: AAO x4 MAEx4 PERRL  Chest: resps even unlabored, cap refill <2 sec  Abd: soft, non-tender, without masses or organomegaly  Wound (if present): wound margins intact and healing well.  No signs of infection.  Stoma (if present): n/a    Recent Results (from the past 24 hour(s))   CBC auto differential    Collection Time: 03/29/17  7:44 AM   Result Value Ref Range    WBC 11.28 3.90 - 12.70 K/uL    RBC 3.35 (L) 4.00 - 5.40 M/uL    Hemoglobin 10.6 (L) 12.0 - 16.0 g/dL    Hematocrit 31.0 (L) 37.0 - 48.5 %    MCV 93 82 - 98 fL    MCH 31.6 (H) 27.0 - 31.0 pg    MCHC 34.2 32.0 - 36.0 %    RDW 13.1 11.5 - 14.5 %    Platelets 184 150 - 350 K/uL    MPV 10.6 9.2 - 12.9 fL    Gran # 7.2 1.8 - 7.7 K/uL    Lymph # 3.3 1.0 - 4.8 K/uL    Mono # 0.7 0.3 - 1.0 K/uL    Eos # 0.0 0.0 - 0.5 K/uL    Baso # 0.01 0.00 - 0.20 K/uL    Gran% 63.8 38.0 - 73.0 %    Lymph% 29.3 18.0 - 48.0 %    Mono% 6.4 4.0 - 15.0 %    Eosinophil% 0.1 0.0 - 8.0 %    Basophil% 0.1 0.0 - 1.9 %    Differential Method Automated    Basic metabolic panel    Collection Time: 03/29/17  7:44 AM   Result Value Ref Range    Sodium 138 136 - 145 mmol/L    Potassium 4.2 3.5 - 5.1 mmol/L    Chloride 107 95 - 110 mmol/L    CO2 24 23 - 29 mmol/L    Glucose 97 70 - 110 mg/dL    BUN, Bld 7 6 - 20  mg/dL    Creatinine 0.6 0.5 - 1.4 mg/dL    Calcium 8.3 (L) 8.7 - 10.5 mg/dL    Anion Gap 7 (L) 8 - 16 mmol/L    eGFR if African American >60.0 >60 mL/min/1.73 m^2    eGFR if non African American >60.0 >60 mL/min/1.73 m^2       Assessment and Plan       POD 1 lap sigmoidectomy    Continue low residue Diet  Pain control  Ambulate and IS use  Discharge in AM if continues to progress well    Gordon An MD  Colon and Rectal Surgery Fellow  [phone]

## 2017-03-29 NOTE — PLAN OF CARE
Problem: Patient Care Overview  Goal: Plan of Care Review  Outcome: Ongoing (interventions implemented as appropriate)  Plan of care reviewed with pt. Pt verbalized understanding. AAOx4. VSS on room air. Pain managed with prn pain medication. Tolerating diet. Denied n/v. Passing gas. Itching poorly controlled with prn benadryl. Doty intact. Remained free from falls or injuries. Call light within reach. Will call for assistance. No distress noted. Will continue to monitor.

## 2017-03-29 NOTE — PLAN OF CARE
Problem: Patient Care Overview  Goal: Plan of Care Review  Outcome: Revised  Care plan reviewed and understood by patient. Resp rate even and unlabored. VSS. Doty removed and patient has voided adequately. Pt tolerating diet with no reports of nausea. Pt remain free of falls. No acute pain or distress noted. Will continue to monitor.

## 2017-03-30 VITALS
DIASTOLIC BLOOD PRESSURE: 80 MMHG | SYSTOLIC BLOOD PRESSURE: 115 MMHG | OXYGEN SATURATION: 95 % | HEIGHT: 62 IN | TEMPERATURE: 98 F | BODY MASS INDEX: 22.63 KG/M2 | HEART RATE: 72 BPM | RESPIRATION RATE: 20 BRPM | WEIGHT: 123 LBS

## 2017-03-30 LAB
ANION GAP SERPL CALC-SCNC: 6 MMOL/L
BASOPHILS # BLD AUTO: 0.01 K/UL
BASOPHILS NFR BLD: 0.1 %
BUN SERPL-MCNC: 5 MG/DL
CALCIUM SERPL-MCNC: 8.3 MG/DL
CHLORIDE SERPL-SCNC: 107 MMOL/L
CO2 SERPL-SCNC: 28 MMOL/L
CREAT SERPL-MCNC: 0.6 MG/DL
DIFFERENTIAL METHOD: ABNORMAL
EOSINOPHIL # BLD AUTO: 0.3 K/UL
EOSINOPHIL NFR BLD: 3.4 %
ERYTHROCYTE [DISTWIDTH] IN BLOOD BY AUTOMATED COUNT: 13.3 %
EST. GFR  (AFRICAN AMERICAN): >60 ML/MIN/1.73 M^2
EST. GFR  (NON AFRICAN AMERICAN): >60 ML/MIN/1.73 M^2
GLUCOSE SERPL-MCNC: 82 MG/DL
HCT VFR BLD AUTO: 27.8 %
HGB BLD-MCNC: 9.6 G/DL
LYMPHOCYTES # BLD AUTO: 3.2 K/UL
LYMPHOCYTES NFR BLD: 38.2 %
MCH RBC QN AUTO: 32 PG
MCHC RBC AUTO-ENTMCNC: 34.5 %
MCV RBC AUTO: 93 FL
MONOCYTES # BLD AUTO: 0.6 K/UL
MONOCYTES NFR BLD: 7.1 %
NEUTROPHILS # BLD AUTO: 4.3 K/UL
NEUTROPHILS NFR BLD: 51.1 %
PLATELET # BLD AUTO: 154 K/UL
PMV BLD AUTO: 10.6 FL
POTASSIUM SERPL-SCNC: 3.7 MMOL/L
RBC # BLD AUTO: 3 M/UL
SODIUM SERPL-SCNC: 141 MMOL/L
WBC # BLD AUTO: 8.35 K/UL

## 2017-03-30 PROCEDURE — 80048 BASIC METABOLIC PNL TOTAL CA: CPT

## 2017-03-30 PROCEDURE — 25000003 PHARM REV CODE 250: Performed by: NURSE PRACTITIONER

## 2017-03-30 PROCEDURE — 36415 COLL VENOUS BLD VENIPUNCTURE: CPT

## 2017-03-30 PROCEDURE — 85025 COMPLETE CBC W/AUTO DIFF WBC: CPT

## 2017-03-30 PROCEDURE — 25000003 PHARM REV CODE 250: Performed by: SURGERY

## 2017-03-30 RX ORDER — IBUPROFEN 800 MG/1
800 TABLET ORAL EVERY 6 HOURS PRN
Qty: 30 TABLET | Refills: 0 | Status: SHIPPED | OUTPATIENT
Start: 2017-03-30 | End: 2017-04-10

## 2017-03-30 RX ORDER — HYDROMORPHONE HYDROCHLORIDE 2 MG/1
2 TABLET ORAL EVERY 4 HOURS PRN
Qty: 30 TABLET | Refills: 0 | Status: SHIPPED | OUTPATIENT
Start: 2017-03-30 | End: 2017-04-10

## 2017-03-30 RX ORDER — TRAMADOL HYDROCHLORIDE 50 MG/1
100 TABLET ORAL EVERY 6 HOURS PRN
Qty: 60 TABLET | Refills: 0 | Status: SHIPPED | OUTPATIENT
Start: 2017-03-30 | End: 2017-04-10

## 2017-03-30 RX ORDER — HYDROMORPHONE HYDROCHLORIDE 2 MG/1
2 TABLET ORAL EVERY 4 HOURS PRN
Status: DISCONTINUED | OUTPATIENT
Start: 2017-03-30 | End: 2017-03-30 | Stop reason: HOSPADM

## 2017-03-30 RX ORDER — HYDROMORPHONE HYDROCHLORIDE 1 MG/ML
1 INJECTION, SOLUTION INTRAMUSCULAR; INTRAVENOUS; SUBCUTANEOUS EVERY 4 HOURS PRN
Status: DISCONTINUED | OUTPATIENT
Start: 2017-03-30 | End: 2017-03-30

## 2017-03-30 RX ADMIN — TRAMADOL HYDROCHLORIDE 100 MG: 50 TABLET, FILM COATED ORAL at 12:03

## 2017-03-30 RX ADMIN — IBUPROFEN 800 MG: 800 INJECTION INTRAVENOUS at 05:03

## 2017-03-30 RX ADMIN — ALVIMOPAN 12 MG: 12 CAPSULE ORAL at 09:03

## 2017-03-30 RX ADMIN — HYDROMORPHONE HYDROCHLORIDE 2 MG: 2 TABLET ORAL at 09:03

## 2017-03-30 RX ADMIN — NICOTINE 1 PATCH: 14 PATCH, EXTENDED RELEASE TRANSDERMAL at 09:03

## 2017-03-30 RX ADMIN — TRAMADOL HYDROCHLORIDE 100 MG: 50 TABLET, FILM COATED ORAL at 06:03

## 2017-03-30 RX ADMIN — HYDROMORPHONE HYDROCHLORIDE 2 MG: 2 TABLET ORAL at 12:03

## 2017-03-30 NOTE — DISCHARGE SUMMARY
[facility]  Discharge Summary  Colorectal Surgery      Admit Date: 3/28/2017    Discharge Date and Time: 3/30/2017 12:47 PM     Attending Physician: No att. providers found     Discharge Provider: Jazmine Delgado    Reason for Admission:     Procedures Performed: Procedure(s) (LRB):  SIGMOIDECTOMY-LAPAROSCOPIC (N/A)    Hospital Course (synopsis of major diagnoses, care, treatment, and services provided during the course of the hospital stay): Ms Gray underwent a lap sigmoidectomy 3/30 for colon CA. She had an uncomplicated post operative course. By discharge day, she was tolerating a low fiber diet, ambulatory, VSS/afebrile, pain was controlled with oral pain medication. She did not tolerate percocet due to severe itching. She was sent home with oral pain medication (ibuprofen and dilaudid) and a follow up with Dr. Abel in 2 weeks.     Consults: none    Significant Diagnostic Studies: Labs:   BMP:   Recent Labs  Lab 03/29/17  0744 03/30/17  0348   GLU 97 82    141   K 4.2 3.7    107   CO2 24 28   BUN 7 5*   CREATININE 0.6 0.6   CALCIUM 8.3* 8.3*   , CMP   Recent Labs  Lab 03/29/17  0744 03/30/17  0348    141   K 4.2 3.7    107   CO2 24 28   GLU 97 82   BUN 7 5*   CREATININE 0.6 0.6   CALCIUM 8.3* 8.3*   ANIONGAP 7* 6*   ESTGFRAFRICA >60.0 >60.0   EGFRNONAA >60.0 >60.0    and CBC   Recent Labs  Lab 03/29/17  0744 03/30/17  0348   WBC 11.28 8.35   HGB 10.6* 9.6*   HCT 31.0* 27.8*    154       Final Diagnoses:    Principal Problem: Colon polyp   Secondary Diagnoses:   Active Hospital Problems    Diagnosis  POA    *Colon polyp [K63.5]  Yes    Tobacco abuse [Z72.0]  Yes      Resolved Hospital Problems    Diagnosis Date Resolved POA   No resolved problems to display.    and     Discharged Condition: good    Disposition: Home or Self Care    Follow Up/Patient Instructions:     Medications:  Reconciled Home Medications:   * Pt was not given script for  tramadol  Discharge Medication List as of 3/30/2017 12:26 PM      START taking these medications    Details   HYDROmorphone (DILAUDID) 2 MG tablet Take 1 tablet (2 mg total) by mouth every 4 (four) hours as needed for Pain., Starting 3/30/2017, Until Discontinued, Print      ibuprofen (ADVIL,MOTRIN) 800 MG tablet Take 1 tablet (800 mg total) by mouth every 6 (six) hours as needed for Pain., Starting 3/30/2017, Until Discontinued, Print      tramadol (ULTRAM) 50 mg tablet Take 2 tablets (100 mg total) by mouth every 6 (six) hours as needed for Pain., Starting 3/30/2017, Until Sun 4/9/17, Print         CONTINUE these medications which have NOT CHANGED    Details   gabapentin (NEURONTIN) 300 MG capsule Take 1 capsule (300 mg total) by mouth every evening., Starting 1/17/2017, Until Wed 1/17/18, Normal      RESTASIS 0.05 % ophthalmic emulsion Place 1 drop into both eyes every evening. , Starting 2/14/2017, Until Discontinued, Historical Med      varenicline (CHANTIX STARTING MONTH BERNICE) 0.5 mg (11)- 1 mg (42) tablet Take one 0.5mg tab by mouth once daily X3 days,then increase to one 0.5mg tab twice daily X4 days,then increase to one 1mg tab twice daily, Normal         STOP taking these medications       metronidazole (FLAGYL) 500 MG tablet Comments:   Reason for Stopping:         neomycin (MYCIFRADIN) 500 mg Tab Comments:   Reason for Stopping:               Discharge Procedure Orders  Diet general   Order Comments: Low Fiber/ Low Residue Diet for 4-5 days then advance as tolerated.     Other restrictions (specify):   Order Comments: No lifting greater than 5lbs     Call MD for:  temperature >100.4     Call MD for:  persistent nausea and vomiting or diarrhea     Call MD for:  severe uncontrolled pain       Follow-up Information     Follow up with Polo Abel MD. Go on 4/10/2017.    Specialty:  Colon and Rectal Surgery    Why:  Postop follow-up    Contact information:    [address]  [facility]  [id number]  [phone]

## 2017-03-30 NOTE — PLAN OF CARE
Patient discharged home with family support.  No  care or HME needs.    Future Appointments  Date Time Provider Department Center   4/10/2017 9:15 AM Polo Abel MD [facility]        03/30/17 [id number]   Final Note   Assessment Type Final Discharge Note   Discharge Disposition Home   Discharge planning education complete? Yes   Hospital Follow Up  Appt(s) scheduled? Yes   Discharge plans and expectations educations in teach back method with documentation complete? Yes   Offered [facility] Bedside Delivery? n/a   Referral to Outpatient Case Management complete? n/a   Referral to / orders for Home Health Complete? n/a   30 day supply of medicines given at discharge, if documented non-compliance / non-adherence? n/a   Any social issues identified prior to discharge? No   Did you assess the readiness or willingness of the family or caregiver to support self management of care? n/a

## 2017-03-30 NOTE — PLAN OF CARE
Problem: Patient Care Overview  Goal: Plan of Care Review  Outcome: Ongoing (interventions implemented as appropriate)  POC reviewed with patient, who verbalized understanding. AAOx4. Remains free of falls and injury. VSS. X4 lap sites INEZ. Tolerating Low fiber/ low res diet. Denies nausea and has pain controlled with PRN meds. IVF infusing, voiding per commode/hat. No BM but passing gas. Up ad burke. TEDS and  SCDS in place. No acute events. No distress noted.  Possible DC today. Call bell in reach. Will continue to monitor.

## 2017-03-30 NOTE — NURSING
Discharge instructions given to pt, pt verbalized understanding. Prescriptions given to pt. PIV removed. Pt leaving in wheelchair with spouse.

## 2017-04-03 ENCOUNTER — PATIENT OUTREACH (OUTPATIENT)
Dept: ADMINISTRATIVE | Facility: CLINIC | Age: 52
End: 2017-04-03
Payer: COMMERCIAL

## 2017-04-03 NOTE — PATIENT INSTRUCTIONS
Having Rectal Surgery  During rectal surgery, the rectum is surgically removed. You will be given instructions on how to prepare for your surgery. Follow these instructions carefully. You will likely be admitted to the hospital on the day of your surgery. In certain cases, admission to the hospital the day before is needed. To help prepare your body, you will be instructed on what to do before surgery. Follow these instructions carefully. Ask questions if something is unclear.  Preparing a few weeks before surgery  Recommendations for preparing ahead of time for surgery include:   · Have a thorough physical exam as instructed by your healthcare provider.  · Ask about medicines. Tell your surgeon about all medicines you take, and ask whether you should stop taking them. This includes prescription medicines, aspirin, nonsteriodal anti-inflammatory (NSAIDs), other blood thinners like warfarin, and other over-the-counter medicines. Mention any herbs or supplements you take.  · Quit smoking. Smoking increases surgery risks and slows healing.  Preparing the day before surgery  Recommendations for the day before surgery include:   · Have only clear liquids. For 12 to 24 hours before your surgery, you will be told not to eat any solid foods and to drink only clear liquids. These liquids include broth, plain coffee, gelatin, and clear fruit juice.  · Do your bowel prep. To be sure your colon and rectum are clear of stool, you may be asked to do a bowel prep for 12 to 24 hours before surgery. This involves drinking a liquid laxative, using enemas, or both.  · Make sure your stomach is empty. Do not have anything to eat or drink, including water and chewing gum, after midnight the night before surgery. (Your bowel prep liquid is OK to drink during this time.) If you are asked to take antibiotic pills before surgery, take them with small sips of water.  The day of surgery  When you arrive at the hospital, you will be asked  fill out certain forms. You will then change into a gown. An intravenous (IV) line will be inserted into your arm. This provides fluids and medicines. Youll meet with your anesthesiologist or nurse anesthetist to discuss the medicine that helps you sleep during surgery. Ask any questions you have at this time. Before surgery begins, youll be given general anesthesia to put you into a deep sleep. A soft tube called a catheter may be placed into your bladder to drain urine.  During your surgery  What to expect during your surgery:  · Your surgery may be done through one incision in your abdomen. This is called open surgery. The incision may be several inches long. Or, the surgery may be done laparoscopically. This means the surgeon makes several small incisions. A laparoscope (a thin telescope-like tube) is then placed into one of the small incisions. This allows your healthcare provider to view the surgery on a video monitor, and to operate through the small incisions.  · Part or all of the rectum is resected (removed). Part or all of the sigmoid colon may also be removed. The anus may be removed as well.  · If some of the rectum or anus remains, the colon may be reconnected to it. This is called an anastomosis.  · If the rectum and anus are removed, a colostomy is done. This creates a new opening in the abdomen so waste can leave the body.  · Once surgery is done, youll be taken to a recovery room.  Types of rectal resection  Below are the two types of rectal surgery. Your healthcare provider can discuss these with you.    Low anterior resection. The sigmoid colon and a portion of the rectum are removed. The descending colon is reconnected to the remaining rectum.   Abdominal perineal resection. Part or all of the sigmoid colon and the entire rectum and anus are removed. A colostomy is then performed.      If you have a colostomy  During a colostomy, part of the colon is connected to an opening in the abdominal  wall. This new opening is called a stoma. This is where stool now leaves the body. Stool passes through the stoma into a special bag or appliance. Your healthcare team will help you learn how to care for your stoma. Adjusting to having a colostomy can seem overwhelming. But you will get lots of support. In time, caring for your stoma will become part of your daily routine.     Risks and possible complications   Risks and possible complications of rectal surgery include the following:  · Infection  · Injury to nearby organs, such as the kidneys  · Leaking or separation of anastomosis  · Blood clots  · Internal bleeding   · Adhesions or bowel obstruction   · Risks of anesthesia   Date Last Reviewed: 8/1/2016  © 1796-9717 [employer]. [address] [place] [address], [place] [address]. All rights reserved. This information is not intended as a substitute for professional medical care. Always follow your healthcare professional's instructions.        Having Bowel Surgery: Limited Bowel Resection  This surgery is done to treat diseases of the digestive tract. It removes part of the large and small intestines. When healed, bowel movements still occur through the anus.  Preparing for surgery  Preparation may begin a few weeks before surgery and can include the following:  · If you smoke, try to quit.  · Tell your doctor about any medications (including aspirin, NSAIDs, and blood thinners), herbs, or supplements you take. Ask whether you should stop any of them before surgery.  · If you will have a stoma, a specially trained healthcare provider called an enterostomal therapy (ET) nurse will meet with you. The ET nurse will help you determine the optimal location for the stoma on the abdominal wall.  · If instructed, stop eating solid food a day or two before surgery. Switch to a diet of clear liquids such as broth.  · You may receive instructions for bowel prep, which helps ensure that your digestive tract is ready for  surgery.  · Dont eat or drink anything after midnight the night before surgery. This includes water, gum, and breath mints.  · If your doctor tells you to take medication the morning of surgery, swallow it with only small sips of water.      The procedure  · The diseased portion of the intestine is removed (resected).  · If there is an abscess (infected area), the abscess is drained or removed.  · The ends of the intestine are sewn together. This connection is called an anastomosis.  Risks and complications  Bowel surgery has certain risks and possible complications. Your health care provider can discuss them with you. They may include:  · Infection  · Injury to nearby organs  · An anastomosis that leaks  · Blood clots  · Risks related to anesthesia   Date Last Reviewed: 3/14/2013  © 5438-2855 [employer]. [address] [place] [address], [place] [address]. All rights reserved. This information is not intended as a substitute for professional medical care. Always follow your healthcare professional's instructions.

## 2017-04-07 ENCOUNTER — TELEPHONE (OUTPATIENT)
Dept: SURGERY | Facility: CLINIC | Age: 52
End: 2017-04-07

## 2017-04-07 NOTE — TELEPHONE ENCOUNTER
----- Message from Siria Noel sent at 4/7/2017  9:06 AM CDT -----  Contact: PT #[phone]  Pt states that she has flu like symptoms and wants to speak with you. Please call

## 2017-04-07 NOTE — TELEPHONE ENCOUNTER
Spoke with patient this am. States had two days off fever-100.0. 99.2 today. Denies ant other signs of infection. Instructed to call clinic  fever escalates or has any other signs of infection, issues or concerns.

## 2017-04-10 ENCOUNTER — LAB VISIT (OUTPATIENT)
Dept: LAB | Facility: HOSPITAL | Age: 52
End: 2017-04-10
Payer: COMMERCIAL

## 2017-04-10 ENCOUNTER — OFFICE VISIT (OUTPATIENT)
Dept: SURGERY | Facility: CLINIC | Age: 52
End: 2017-04-10
Payer: COMMERCIAL

## 2017-04-10 VITALS
DIASTOLIC BLOOD PRESSURE: 74 MMHG | HEART RATE: 71 BPM | HEIGHT: 62 IN | WEIGHT: 120.38 LBS | SYSTOLIC BLOOD PRESSURE: 100 MMHG | BODY MASS INDEX: 22.15 KG/M2

## 2017-04-10 DIAGNOSIS — R19.7 DIARRHEA, UNSPECIFIED TYPE: ICD-10-CM

## 2017-04-10 DIAGNOSIS — Z09 POSTOP CHECK: ICD-10-CM

## 2017-04-10 DIAGNOSIS — R19.7 DIARRHEA, UNSPECIFIED TYPE: Primary | ICD-10-CM

## 2017-04-10 LAB
BASOPHILS # BLD AUTO: 0.03 K/UL
BASOPHILS NFR BLD: 0.3 %
DIFFERENTIAL METHOD: ABNORMAL
EOSINOPHIL # BLD AUTO: 0.2 K/UL
EOSINOPHIL NFR BLD: 2.1 %
ERYTHROCYTE [DISTWIDTH] IN BLOOD BY AUTOMATED COUNT: 13.1 %
HCT VFR BLD AUTO: 38.8 %
HGB BLD-MCNC: 13.1 G/DL
LYMPHOCYTES # BLD AUTO: 2.8 K/UL
LYMPHOCYTES NFR BLD: 31 %
MCH RBC QN AUTO: 31.5 PG
MCHC RBC AUTO-ENTMCNC: 33.8 %
MCV RBC AUTO: 93 FL
MONOCYTES # BLD AUTO: 0.5 K/UL
MONOCYTES NFR BLD: 5.3 %
NEUTROPHILS # BLD AUTO: 5.6 K/UL
NEUTROPHILS NFR BLD: 61.1 %
PLATELET # BLD AUTO: 351 K/UL
PMV BLD AUTO: 10.9 FL
RBC # BLD AUTO: 4.16 M/UL
WBC # BLD AUTO: 9.13 K/UL

## 2017-04-10 PROCEDURE — 99024 POSTOP FOLLOW-UP VISIT: CPT | Mod: S$GLB,,, | Performed by: COLON & RECTAL SURGERY

## 2017-04-10 PROCEDURE — 99999 PR PBB SHADOW E&M-EST. PATIENT-LVL III: CPT | Mod: PBBFAC,,, | Performed by: COLON & RECTAL SURGERY

## 2017-04-10 PROCEDURE — 36415 COLL VENOUS BLD VENIPUNCTURE: CPT

## 2017-04-10 PROCEDURE — 85025 COMPLETE CBC W/AUTO DIFF WBC: CPT

## 2017-04-10 NOTE — PROGRESS NOTES
Subjective:       Patient ID: Carmen Gray is a 51 y.o. female.    Chief Complaint: Post-op Evaluation    HPI   51 F s/p lap sigmoidectomy 3/28/17. She was doing well until Friday when she began to have low grade fevers, body aches, chills and diarrhea. Fever resolved, but diarrhea persisted over the weekend, over 10 episodes on Saturday. Frequency of bowel movements decreasing. Fever resolved, denies blood in stool abdominal pain and n/v    Review of Systems   Constitutional: Positive for appetite change. Negative for fatigue, fever and unexpected weight change.   Respiratory: Negative for cough and shortness of breath.    Cardiovascular: Negative for chest pain and leg swelling.   Gastrointestinal: Positive for diarrhea. Negative for abdominal distention, abdominal pain, anal bleeding, blood in stool, constipation, nausea, rectal pain and vomiting.       Objective:      Physical Exam   Constitutional: She is oriented to person, place, and time. She appears well-developed and well-nourished. No distress.   Eyes: Conjunctivae and EOM are normal.   Pulmonary/Chest: Effort normal. No respiratory distress.   Abdominal: Soft. She exhibits no distension. There is no tenderness.   Musculoskeletal: Normal range of motion.   Neurological: She is alert and oriented to person, place, and time.   Skin: Skin is warm and dry.   Psychiatric: She has a normal mood and affect. Her behavior is normal.       Assessment:       1. Diarrhea, unspecified type      2. Postop sigmoid colectomy - path pending  Plan:     Check c.diff  F/u 1 month  Call when final pathology available.

## 2017-04-21 ENCOUNTER — OFFICE VISIT (OUTPATIENT)
Dept: INTERNAL MEDICINE | Facility: CLINIC | Age: 52
End: 2017-04-21
Payer: COMMERCIAL

## 2017-04-21 VITALS
HEART RATE: 75 BPM | OXYGEN SATURATION: 96 % | WEIGHT: 123.25 LBS | BODY MASS INDEX: 22.68 KG/M2 | HEIGHT: 62 IN | DIASTOLIC BLOOD PRESSURE: 74 MMHG | SYSTOLIC BLOOD PRESSURE: 104 MMHG

## 2017-04-21 DIAGNOSIS — Z85.038 HISTORY OF COLON CANCER: ICD-10-CM

## 2017-04-21 DIAGNOSIS — F51.01 PRIMARY INSOMNIA: Primary | ICD-10-CM

## 2017-04-21 DIAGNOSIS — Z90.49 STATUS POST PARTIAL COLECTOMY: ICD-10-CM

## 2017-04-21 PROCEDURE — 99214 OFFICE O/P EST MOD 30 MIN: CPT | Mod: S$GLB,,, | Performed by: INTERNAL MEDICINE

## 2017-04-21 PROCEDURE — 99999 PR PBB SHADOW E&M-EST. PATIENT-LVL III: CPT | Mod: PBBFAC,,, | Performed by: INTERNAL MEDICINE

## 2017-04-21 PROCEDURE — 1160F RVW MEDS BY RX/DR IN RCRD: CPT | Mod: S$GLB,,, | Performed by: INTERNAL MEDICINE

## 2017-04-21 RX ORDER — LORAZEPAM 0.5 MG/1
0.5 TABLET ORAL NIGHTLY PRN
Qty: 30 TABLET | Refills: 0 | Status: SHIPPED | OUTPATIENT
Start: 2017-04-21 | End: 2017-05-10 | Stop reason: SDUPTHER

## 2017-04-21 NOTE — PROGRESS NOTES
Subjective:    Portions of this note are generated with voice recognition software. Typographical errors may exist.   Patient ID: Carmen Gray is a 51 y.o. female.    Chief Complaint: Insomnia; Follow-up; and Medication Refill (wants to discuss different gabapentin)    HPI: 51-year-old lady here for follow-up visit.  She was diagnosed with rectal cancer had a laparoscopic sigmoid colectomy.  The patient had short episode of diarrhea lasting for about 4 days and is now resolved.  She had associated fever with that episode..  Now her bowel movements are back to normal for the past 2 days.  No history of abdominal pain nausea or vomiting.  No history of fever or chills.  She complains of insomnia.  Difficulty with the onset of sleep and also remaining asleep through the entire night.  She is on Gabapentin for restless leg syndrome.  Denies any anxiety.  She has a change in her routine after his surgery because she has not gone back to work.  She tends to delay her bedtime.  She has also been watching a lot of TV.  She denies any intake of caffeine.    Review of patient's allergies indicates:   Allergen Reactions    Codeine Itching    Hydrocodone Itching     Past Medical History:   Diagnosis Date    Anxiety     Depression     Insomnia      Past Surgical History:   Procedure Laterality Date    CHOLECYSTECTOMY      COLONOSCOPY N/A 2/7/2017    Procedure: COLONOSCOPY  split;  Surgeon: Dallas Mar MD;  Location: [facility];  Service: Endoscopy;  Laterality: N/A;    gastric sleeve  2015    HERNIA REPAIR      hiatal     TUBAL LIGATION       Family History   Problem Relation Age of Onset    Hypertension Mother     Hypertension Father     Hypertension Brother     Thyroid disease      Anesthesia problems Neg Hx      Social History     Social History    Marital status:      Spouse name: N/A    Number of children: N/A    Years of education: N/A     Occupational History    Not on file.      Social History Main Topics    Smoking status: Current Every Day Smoker     Packs/day: 0.50     Years: 30.00     Types: Cigarettes    Smokeless tobacco: Never Used    Alcohol use Yes      Comment: social (history of heavy drinking around the time of her divorce)    Drug use: No    Sexual activity: Not on file     Other Topics Concern    Not on file     Social History Narrative     ROS:  GENERAL:   SKIN:   HEAD: No headaches.  EYES: No Blurriing of vision  EARS: No ear pain or changes in hearing.  NOSE: No congestion or rhinorrhea.  MOUTH & THROAT: No hoarseness, change in voice, or sore throat.  NODES: Denies swollen glands.  CHEST: Does chronically get some slight shortness of breath with going up her stairs. No chest pain. No acute worsening recently.  CARDIOVASCULAR: Denies chest pain, PND, orthopnea.  ABDOMEN: No nausea, vomiting, or changes in bowel function.  URINARY: No flank pain, dysuria or hematuria.  PERIPHERAL VASCULAR: No claudication or cyanosis.  MUSCULOSKELETAL: No joint stiffness or swelling. Denies back pain.  NEUROLOGIC: No weakness or numbness.    Physical Exam       Vital signs reviewed  PE:   APPEARANCE: Well nourished, well developed, in no acute distress.   HEAD: Normocephalic, atraumatic.  EYES: PERRL. EOMI. Conjunctivae noninjected.  EARS: TM's intact. Light reflex normal. No retraction or perforation  NOSE: Mucosa pink. Airway clear.  MOUTH & THROAT: No tonsillar enlargement. No pharyngeal erythema or exudate.   NECK: Supple with no cervical lymphadenopathy. No carotid bruits. No thyromegaly  CHEST: Good inspiratory effort. Lungs clear to auscultation with no wheezes or crackles.  CARDIOVASCULAR: Normal S1, S2. No rubs, murmurs, or gallops.  ABDOMEN: Bowel sounds normal. Not distended.  Not tender  Laparoscopic incision sites well-healed.  EXTREMITIES: No edema, cyanosis, or clubbing.     Assessment:     Labs:    Recent Results (from the past 1008 hour(s))   TYPE AND SCREEN PREOP     Collection Time: 03/27/17 10:30 AM   Result Value Ref Range    Group & Rh O POS     Indirect Tylor NEG    CBC auto differential    Collection Time: 03/29/17  7:44 AM   Result Value Ref Range    WBC 11.28 3.90 - 12.70 K/uL    RBC 3.35 (L) 4.00 - 5.40 M/uL    Hemoglobin 10.6 (L) 12.0 - 16.0 g/dL    Hematocrit 31.0 (L) 37.0 - 48.5 %    MCV 93 82 - 98 fL    MCH 31.6 (H) 27.0 - 31.0 pg    MCHC 34.2 32.0 - 36.0 %    RDW 13.1 11.5 - 14.5 %    Platelets 184 150 - 350 K/uL    MPV 10.6 9.2 - 12.9 fL    Gran # 7.2 1.8 - 7.7 K/uL    Lymph # 3.3 1.0 - 4.8 K/uL    Mono # 0.7 0.3 - 1.0 K/uL    Eos # 0.0 0.0 - 0.5 K/uL    Baso # 0.01 0.00 - 0.20 K/uL    Gran% 63.8 38.0 - 73.0 %    Lymph% 29.3 18.0 - 48.0 %    Mono% 6.4 4.0 - 15.0 %    Eosinophil% 0.1 0.0 - 8.0 %    Basophil% 0.1 0.0 - 1.9 %    Differential Method Automated    Basic metabolic panel    Collection Time: 03/29/17  7:44 AM   Result Value Ref Range    Sodium 138 136 - 145 mmol/L    Potassium 4.2 3.5 - 5.1 mmol/L    Chloride 107 95 - 110 mmol/L    CO2 24 23 - 29 mmol/L    Glucose 97 70 - 110 mg/dL    BUN, Bld 7 6 - 20 mg/dL    Creatinine 0.6 0.5 - 1.4 mg/dL    Calcium 8.3 (L) 8.7 - 10.5 mg/dL    Anion Gap 7 (L) 8 - 16 mmol/L    eGFR if African American >60.0 >60 mL/min/1.73 m^2    eGFR if non African American >60.0 >60 mL/min/1.73 m^2   CBC auto differential    Collection Time: 03/30/17  3:48 AM   Result Value Ref Range    WBC 8.35 3.90 - 12.70 K/uL    RBC 3.00 (L) 4.00 - 5.40 M/uL    Hemoglobin 9.6 (L) 12.0 - 16.0 g/dL    Hematocrit 27.8 (L) 37.0 - 48.5 %    MCV 93 82 - 98 fL    MCH 32.0 (H) 27.0 - 31.0 pg    MCHC 34.5 32.0 - 36.0 %    RDW 13.3 11.5 - 14.5 %    Platelets 154 150 - 350 K/uL    MPV 10.6 9.2 - 12.9 fL    Gran # 4.3 1.8 - 7.7 K/uL    Lymph # 3.2 1.0 - 4.8 K/uL    Mono # 0.6 0.3 - 1.0 K/uL    Eos # 0.3 0.0 - 0.5 K/uL    Baso # 0.01 0.00 - 0.20 K/uL    Gran% 51.1 38.0 - 73.0 %    Lymph% 38.2 18.0 - 48.0 %    Mono% 7.1 4.0 - 15.0 %    Eosinophil% 3.4 0.0 -  8.0 %    Basophil% 0.1 0.0 - 1.9 %    Differential Method Automated    Basic metabolic panel    Collection Time: 03/30/17  3:48 AM   Result Value Ref Range    Sodium 141 136 - 145 mmol/L    Potassium 3.7 3.5 - 5.1 mmol/L    Chloride 107 95 - 110 mmol/L    CO2 28 23 - 29 mmol/L    Glucose 82 70 - 110 mg/dL    BUN, Bld 5 (L) 6 - 20 mg/dL    Creatinine 0.6 0.5 - 1.4 mg/dL    Calcium 8.3 (L) 8.7 - 10.5 mg/dL    Anion Gap 6 (L) 8 - 16 mmol/L    eGFR if African American >60.0 >60 mL/min/1.73 m^2    eGFR if non African American >60.0 >60 mL/min/1.73 m^2   CBC auto differential    Collection Time: 04/10/17 10:05 AM   Result Value Ref Range    WBC 9.13 3.90 - 12.70 K/uL    RBC 4.16 4.00 - 5.40 M/uL    Hemoglobin 13.1 12.0 - 16.0 g/dL    Hematocrit 38.8 37.0 - 48.5 %    MCV 93 82 - 98 fL    MCH 31.5 (H) 27.0 - 31.0 pg    MCHC 33.8 32.0 - 36.0 %    RDW 13.1 11.5 - 14.5 %    Platelets 351 (H) 150 - 350 K/uL    MPV 10.9 9.2 - 12.9 fL    Gran # 5.6 1.8 - 7.7 K/uL    Lymph # 2.8 1.0 - 4.8 K/uL    Mono # 0.5 0.3 - 1.0 K/uL    Eos # 0.2 0.0 - 0.5 K/uL    Baso # 0.03 0.00 - 0.20 K/uL    Gran% 61.1 38.0 - 73.0 %    Lymph% 31.0 18.0 - 48.0 %    Mono% 5.3 4.0 - 15.0 %    Eosinophil% 2.1 0.0 - 8.0 %    Basophil% 0.3 0.0 - 1.9 %    Differential Method Automated      1. Primary insomnia    2. Status post partial colectomy    3. History of colon cancer        Plan:   Educated her about sleep hygiene.  Started her on lorazepam 0.5 mg 1-2 tablets daily at bedtime when necessary sleep.  Follow-up in 3 weeks.  Patient is stable.  Has no diarrhea at present.  Started having normal bowel movements for the past 2-3 days.  No abdominal pain.  No history of fever or chills.  Follow-up in 3 weeks or earlier if required

## 2017-04-21 NOTE — PATIENT INSTRUCTIONS
"From Up To Date      Sleep hygiene: Basic rules for a good night's sleep  Sleep only as much as you need to feel rested and then get out of bed   Keep a regular sleep schedule   Avoid forcing sleep   Exercise regularly for at least 20 minutes, preferably 4 to 5 hours before bedtime   Avoid caffeinated beverages after lunch   Avoid alcohol near bedtime: no "night cap"   Avoid smoking, especially in the evening   Do not go to bed hungry   Adjust bedroom environment   Avoid prolonged use of light-emitting screens before bedtime    Deal with your worries before bedtime       "

## 2017-04-21 NOTE — MR AVS SNAPSHOT
[facility]  [address] [place] [address] [address]  [place] [address]  Phone: [phone]  Fax: [phone]                  Carmen Gray   2017 10:40 AM   Office Visit    Description:  Female : 1965   Provider:  Carmen Phan MD   Department:  [facility]           Reason for Visit     Insomnia     Follow-up     Medication Refill           Diagnoses this Visit        Comments    Primary insomnia    -  Primary     Status post partial colectomy         History of colon cancer                To Do List           Future Appointments        Provider Department Dept Phone    2017 11:20 AM Carmen Phan MD [facility] [phone]    2017 10:45 AM Polo Abel MD [facility] [phone]      Goals (5 Years of Data)     None      Follow-Up and Disposition     Return in about 3 weeks (around 2017).       These Medications        Disp Refills Start End    lorazepam (ATIVAN) 0.5 MG tablet 30 tablet 0 2017    Take 1 tablet (0.5 mg total) by mouth nightly as needed for Anxiety. - Oral    [facility] [address] [place] [address] [place] [address] [address] AT [facility] Ph #: [phone]         [facility]     [facility] Nurse Care Line - 24/ Assistance  Unless otherwise directed by your provider, please contact [facility], our nurse care line that is available for 24/7 assistance.     Registered nurses in the [facility] provide: appointment scheduling, clinical advisement, health education, and other advisory services.  Call: [phone] (toll free)               Medications           Message regarding Medications     Verify the changes and/or additions to your medication regime listed below are the same as discussed with your clinician today.  If any of these changes or additions are incorrect, please notify your healthcare provider.       "  START taking these NEW medications        Refills    lorazepam (ATIVAN) 0.5 MG tablet 0    Sig: Take 1 tablet (0.5 mg total) by mouth nightly as needed for Anxiety.    Class: Print    Route: Oral           Verify that the below list of medications is an accurate representation of the medications you are currently taking.  If none reported, the list may be blank. If incorrect, please contact your healthcare provider. Carry this list with you in case of emergency.           Current Medications     gabapentin (NEURONTIN) 300 MG capsule Take 1 capsule (300 mg total) by mouth every evening.    RESTASIS 0.05 % ophthalmic emulsion Place 1 drop into both eyes every evening.     varenicline (CHANTIX STARTING MONTH BERNICE) 0.5 mg (11)- 1 mg (42) tablet Take one 0.5mg tab by mouth once daily X3 days,then increase to one 0.5mg tab twice daily X4 days,then increase to one 1mg tab twice daily    lorazepam (ATIVAN) 0.5 MG tablet Take 1 tablet (0.5 mg total) by mouth nightly as needed for Anxiety.           Clinical Reference Information           Your Vitals Were     BP Pulse Height Weight SpO2 BMI    104/74 75 5' 2" (1.575 m) 55.9 kg (123 lb 3.8 oz) 96% 22.54 kg/m2      Blood Pressure          Most Recent Value    BP  104/74      Allergies as of 4/21/2017     Codeine    Hydrocodone      Immunizations Administered on Date of Encounter - 4/21/2017     None      Instructions    From Up To Date      Sleep hygiene: Basic rules for a good night's sleep  Sleep only as much as you need to feel rested and then get out of bed   Keep a regular sleep schedule   Avoid forcing sleep   Exercise regularly for at least 20 minutes, preferably 4 to 5 hours before bedtime   Avoid caffeinated beverages after lunch   Avoid alcohol near bedtime: no "night cap"   Avoid smoking, especially in the evening   Do not go to bed hungry   Adjust bedroom environment   Avoid prolonged use of light-emitting screens before bedtime    Deal with your worries before " bedtime            Smoking Cessation     If you would like to quit smoking:   You may be eligible for free services if you are a [place] resident and started smoking cigarettes before September 1, 1988.  Call the Smoking Cessation Trust (SCT) toll free at (332) 794-8432 or (229) 633-3083.   Call 1-800-QUIT-NOW if you do not meet the above criteria.   Contact us via email: [email or web address]   View our website for more information: [email or web address]        Language Assistance Services     ATTENTION: Language assistance services are available, free of charge. Please call [phone].      ATENCIÓN: Si habla español, tiene a chin disposición servicios gratuitos de asistencia lingüística. Llame al [phone].     CHÚ Ý: N?u b?n nói Ti?ng Vi?t, có các d?ch v? h? tr? ngôn ng? mi?n phí dành cho b?n. G?i s? [phone].         [facility] complies with applicable Federal civil rights laws and does not discriminate on the basis of race, color, national origin, age, disability, or sex.

## 2017-05-10 ENCOUNTER — OFFICE VISIT (OUTPATIENT)
Dept: INTERNAL MEDICINE | Facility: CLINIC | Age: 52
End: 2017-05-10
Payer: COMMERCIAL

## 2017-05-10 VITALS
SYSTOLIC BLOOD PRESSURE: 106 MMHG | HEIGHT: 62 IN | DIASTOLIC BLOOD PRESSURE: 69 MMHG | WEIGHT: 120.81 LBS | OXYGEN SATURATION: 98 % | BODY MASS INDEX: 22.23 KG/M2 | HEART RATE: 71 BPM

## 2017-05-10 DIAGNOSIS — Z90.49 STATUS POST PARTIAL COLECTOMY: ICD-10-CM

## 2017-05-10 DIAGNOSIS — F51.01 PRIMARY INSOMNIA: ICD-10-CM

## 2017-05-10 DIAGNOSIS — Z72.0 TOBACCO ABUSE: Primary | ICD-10-CM

## 2017-05-10 DIAGNOSIS — G25.81 RESTLESS LEG SYNDROME: ICD-10-CM

## 2017-05-10 PROCEDURE — 99999 PR PBB SHADOW E&M-EST. PATIENT-LVL III: CPT | Mod: PBBFAC,,, | Performed by: INTERNAL MEDICINE

## 2017-05-10 PROCEDURE — 99214 OFFICE O/P EST MOD 30 MIN: CPT | Mod: S$GLB,,, | Performed by: INTERNAL MEDICINE

## 2017-05-10 PROCEDURE — 1160F RVW MEDS BY RX/DR IN RCRD: CPT | Mod: S$GLB,,, | Performed by: INTERNAL MEDICINE

## 2017-05-10 RX ORDER — LORAZEPAM 0.5 MG/1
0.5 TABLET ORAL NIGHTLY PRN
Qty: 30 TABLET | Refills: 0 | Status: SHIPPED | OUTPATIENT
Start: 2017-05-10 | End: 2018-01-31 | Stop reason: SDUPTHER

## 2017-05-10 RX ORDER — IBUPROFEN 200 MG
1 TABLET ORAL DAILY
Qty: 42 PATCH | Refills: 0 | COMMUNITY
Start: 2017-05-10 | End: 2017-06-21

## 2017-05-10 RX ORDER — GABAPENTIN 300 MG/1
300 CAPSULE ORAL NIGHTLY
Qty: 30 CAPSULE | Refills: 2 | Status: SHIPPED | OUTPATIENT
Start: 2017-05-10 | End: 2017-05-31

## 2017-05-10 RX ORDER — NICOTINE 7MG/24HR
1 PATCH, TRANSDERMAL 24 HOURS TRANSDERMAL DAILY
Qty: 14 PATCH | Refills: 0 | COMMUNITY
Start: 2017-05-10 | End: 2017-05-24

## 2017-05-10 NOTE — MR AVS SNAPSHOT
[facility]  [address]  [place] [address]  Phone: [phone]  Fax: [phone]                  Carmen Gray   5/10/2017 1:20 PM   Office Visit    Description:  Female : 1965   Provider:  Carmen Phan MD   Department:  [facility]           Reason for Visit     Follow-up           Diagnoses this Visit        Comments    Tobacco abuse    -  Primary     Primary insomnia         Status post partial colectomy                To Do List           Future Appointments        Provider Department Dept Phone    2017 10:45 AM MD [facility] [phone]    2017 10:40 AM Nimisha Sawyer MD [facility] [phone]      Goals (5 Years of Data)     None      Follow-Up and Disposition     Return in about 1 month (around 6/10/2017).       These Medications        Disp Refills Start End    gabapentin (NEURONTIN) 300 MG capsule 30 capsule 2 5/10/2017 5/10/2018    Take 1 capsule (300 mg total) by mouth every evening. - Oral    Pharmacy: [facility] [address] [place] [address] [place] [address] [address] [facility] [address] Ph #: [phone]       lorazepam (ATIVAN) 0.5 MG tablet 30 tablet 0 5/10/2017 2017    Take 1 tablet (0.5 mg total) by mouth nightly as needed for Anxiety. - Oral    Pharmacy: [facility] [id number] [address] [place] [address] [address] [facility] [address] Ph #: [phone]         PURCHASE these Medications (No prescription required)        Start End    nicotine (NICODERM CQ) 14 mg/24 hr 5/10/2017 2017    Sig - Route: Place 1 patch onto the skin once daily. - Transdermal    Class: OTC    nicotine (NICODERM CQ) 7 mg/24 hr 5/10/2017 2017    Sig - Route: Place 1 patch onto the skin once daily. - Transdermal    Class: OTC      [facility]     [facility] Care [facility] -  Assistance  Unless otherwise directed by your provider, please  contact [facility], our nurse care line that is available for 24/7 assistance.     Registered nurses in the [facility] provide: appointment scheduling, clinical advisement, health education, and other advisory services.  Call: [phone] (toll free)               Medications           Message regarding Medications     Verify the changes and/or additions to your medication regime listed below are the same as discussed with your clinician today.  If any of these changes or additions are incorrect, please notify your healthcare provider.        START taking these NEW medications        Refills    nicotine (NICODERM CQ) 14 mg/24 hr 0    Sig: Place 1 patch onto the skin once daily.    Class: OTC    Route: Transdermal    nicotine (NICODERM CQ) 7 mg/24 hr 0    Sig: Place 1 patch onto the skin once daily.    Class: OTC    Route: Transdermal      STOP taking these medications     varenicline (CHANTIX STARTING MONTH BERNICE) 0.5 mg (11)- 1 mg (42) tablet Take one 0.5mg tab by mouth once daily X3 days,then increase to one 0.5mg tab twice daily X4 days,then increase to one 1mg tab twice daily           Verify that the below list of medications is an accurate representation of the medications you are currently taking.  If none reported, the list may be blank. If incorrect, please contact your healthcare provider. Carry this list with you in case of emergency.           Current Medications     gabapentin (NEURONTIN) 300 MG capsule Take 1 capsule (300 mg total) by mouth every evening.    lorazepam (ATIVAN) 0.5 MG tablet Take 1 tablet (0.5 mg total) by mouth nightly as needed for Anxiety.    RESTASIS 0.05 % ophthalmic emulsion Place 1 drop into both eyes every evening.     nicotine (NICODERM CQ) 14 mg/24 hr Place 1 patch onto the skin once daily.    nicotine (NICODERM CQ) 7 mg/24 hr Place 1 patch onto the skin once daily.           Clinical Reference Information           Your Vitals Were     BP Pulse Height Weight  "SpO2 BMI    106/69 71 5' 2" (1.575 m) 54.8 kg (120 lb 13 oz) 98% 22.1 kg/m2      Blood Pressure          Most Recent Value    BP  106/69      Allergies as of 5/10/2017     Codeine    Hydrocodone      Immunizations Administered on Date of Encounter - 5/10/2017     None      Smoking Cessation     If you would like to quit smoking:   You may be eligible for free services if you are a [place] resident and started smoking cigarettes before September 1, 1988.  Call the Smoking Cessation Trust ([facility]) toll free at (826) 587-2501 or (091) 066-9252.   Call 5-032-QUIT-NOW if you do not meet the above criteria.   Contact us via email: [email or web address]   View our website for more information: [email or web address]        Language Assistance Services     ATTENTION: Language assistance services are available, free of charge. Please call [phone].      ATENCIÓN: Si habla español, tiene a chin disposición servicios gratuitos de asistencia lingüística. Llame al [phone].     [facility] Ý: N?u b?n nói Ti?ng Vi?t, có các d?ch v? h? tr? ngôn ng? mi?n phí dành cho b?n. G?i s? [phone].         [facility] complies with applicable Federal civil rights laws and does not discriminate on the basis of race, color, national origin, age, disability, or sex.        "

## 2017-05-10 NOTE — PROGRESS NOTES
Subjective:    Portions of this note are generated with voice recognition software. Typographical errors may exist.   Patient ID: Carmen Gray is a 51 y.o. female.    Chief Complaint: Follow-up    HPI: sleeping well . Using Lorazepam every other night. Following sleep hygeine  Gabapentin working for restless leg syndrome.   back to smoking 1/2 PPD.  Has multiple small meals; BM okay but less frequent. No abdominal pain.  Due to follow up with  tomorrow.    Review of patient's allergies indicates:   Allergen Reactions    Codeine Itching    Hydrocodone Itching     Past Medical History:   Diagnosis Date    Anxiety     Depression     Insomnia      Past Surgical History:   Procedure Laterality Date    CHOLECYSTECTOMY      COLONOSCOPY N/A 2/7/2017    Procedure: COLONOSCOPY  split;  Surgeon: Dallas Mar MD;  [facility];  Service: Endoscopy;  Laterality: N/A;    gastric sleeve  2015    HERNIA REPAIR      hiatal     TUBAL LIGATION       Family History   Problem Relation Age of Onset    Hypertension Mother     Hypertension Father     Hypertension Brother     Thyroid disease      Anesthesia problems Neg Hx      Social History     Social History    Marital status:      Spouse name: N/A    Number of children: N/A    Years of education: N/A     Occupational History    Not on file.     Social History Main Topics    Smoking status: Current Every Day Smoker     Packs/day: 0.50     Years: 30.00     Types: Cigarettes    Smokeless tobacco: Never Used    Alcohol use Yes      Comment: social (history of heavy drinking around the time of her divorce)    Drug use: No    Sexual activity: Not on file     Other Topics Concern    Not on file     Social History Narrative     ROS:  GENERAL:   SKIN:   HEAD: No headaches.  EYES: No Blurriing of vision  EARS: No ear pain or changes in hearing.  NOSE: No congestion or rhinorrhea.  MOUTH & THROAT: No hoarseness, change in voice, or  sore throat.  NODES: Denies swollen glands.  CHEST: Does chronically get some slight shortness of breath with going up her stairs. No chest pain. No acute worsening recently.  CARDIOVASCULAR: Denies chest pain, PND, orthopnea.  ABDOMEN: No nausea, vomiting, or changes in bowel function.  URINARY: No flank pain, dysuria or hematuria.  PERIPHERAL VASCULAR: No claudication or cyanosis.  MUSCULOSKELETAL: No joint stiffness or swelling. Denies back pain.  NEUROLOGIC: No weakness or numbness.    Physical Exam       Vital signs reviewed  PE:   APPEARANCE: Well nourished, well developed, in no acute distress.   HEAD: Normocephalic, atraumatic.  EYES: PERRL. EOMI. Conjunctivae noninjected.  EARS: TM's intact. Light reflex normal. No retraction or perforation  NOSE: Mucosa pink. Airway clear.  MOUTH & THROAT: No tonsillar enlargement. No pharyngeal erythema or exudate.   NECK: Supple with no cervical lymphadenopathy. No carotid bruits. No thyromegaly  CHEST: Good inspiratory effort. Lungs clear to auscultation with no wheezes or crackles.  CARDIOVASCULAR: Normal S1, S2. No rubs, murmurs, or gallops.  ABDOMEN: Bowel sounds normal. Not distended. Soft. No tenderness or masses. No organomegaly.  EXTREMITIES: No edema, cyanosis, or clubbing.     Assessment:     Labs:    Recent Results (from the past 1008 hour(s))   CBC auto differential    Collection Time: 03/30/17  3:48 AM   Result Value Ref Range    WBC 8.35 3.90 - 12.70 K/uL    RBC 3.00 (L) 4.00 - 5.40 M/uL    Hemoglobin 9.6 (L) 12.0 - 16.0 g/dL    Hematocrit 27.8 (L) 37.0 - 48.5 %    MCV 93 82 - 98 fL    MCH 32.0 (H) 27.0 - 31.0 pg    MCHC 34.5 32.0 - 36.0 %    RDW 13.3 11.5 - 14.5 %    Platelets 154 150 - 350 K/uL    MPV 10.6 9.2 - 12.9 fL    Gran # 4.3 1.8 - 7.7 K/uL    Lymph # 3.2 1.0 - 4.8 K/uL    Mono # 0.6 0.3 - 1.0 K/uL    Eos # 0.3 0.0 - 0.5 K/uL    Baso # 0.01 0.00 - 0.20 K/uL    Gran% 51.1 38.0 - 73.0 %    Lymph% 38.2 18.0 - 48.0 %    Mono% 7.1 4.0 - 15.0 %     Eosinophil% 3.4 0.0 - 8.0 %    Basophil% 0.1 0.0 - 1.9 %    Differential Method Automated    Basic metabolic panel    Collection Time: 03/30/17  3:48 AM   Result Value Ref Range    Sodium 141 136 - 145 mmol/L    Potassium 3.7 3.5 - 5.1 mmol/L    Chloride 107 95 - 110 mmol/L    CO2 28 23 - 29 mmol/L    Glucose 82 70 - 110 mg/dL    BUN, Bld 5 (L) 6 - 20 mg/dL    Creatinine 0.6 0.5 - 1.4 mg/dL    Calcium 8.3 (L) 8.7 - 10.5 mg/dL    Anion Gap 6 (L) 8 - 16 mmol/L    eGFR if African American >60.0 >60 mL/min/1.73 m^2    eGFR if non African American >60.0 >60 mL/min/1.73 m^2   CBC auto differential    Collection Time: 04/10/17 10:05 AM   Result Value Ref Range    WBC 9.13 3.90 - 12.70 K/uL    RBC 4.16 4.00 - 5.40 M/uL    Hemoglobin 13.1 12.0 - 16.0 g/dL    Hematocrit 38.8 37.0 - 48.5 %    MCV 93 82 - 98 fL    MCH 31.5 (H) 27.0 - 31.0 pg    MCHC 33.8 32.0 - 36.0 %    RDW 13.1 11.5 - 14.5 %    Platelets 351 (H) 150 - 350 K/uL    MPV 10.9 9.2 - 12.9 fL    Gran # 5.6 1.8 - 7.7 K/uL    Lymph # 2.8 1.0 - 4.8 K/uL    Mono # 0.5 0.3 - 1.0 K/uL    Eos # 0.2 0.0 - 0.5 K/uL    Baso # 0.03 0.00 - 0.20 K/uL    Gran% 61.1 38.0 - 73.0 %    Lymph% 31.0 18.0 - 48.0 %    Mono% 5.3 4.0 - 15.0 %    Eosinophil% 2.1 0.0 - 8.0 %    Basophil% 0.3 0.0 - 1.9 %    Differential Method Automated      1. Tobacco abuse    2. Primary insomnia    3. Status post partial colectomy    4. Restless leg syndrome    COLON CANCER    Plan:   Nicotine patches  Sleep hygiene; lorazepam PRN  Stable followed up by Dr. GA  FOLLOW UP 1 MONTH.

## 2017-05-12 ENCOUNTER — OFFICE VISIT (OUTPATIENT)
Dept: SURGERY | Facility: CLINIC | Age: 52
End: 2017-05-12
Payer: COMMERCIAL

## 2017-05-12 VITALS
WEIGHT: 121.25 LBS | DIASTOLIC BLOOD PRESSURE: 75 MMHG | HEART RATE: 56 BPM | BODY MASS INDEX: 22.18 KG/M2 | SYSTOLIC BLOOD PRESSURE: 119 MMHG

## 2017-05-12 DIAGNOSIS — C18.7 CANCER OF SIGMOID COLON: Primary | ICD-10-CM

## 2017-05-12 PROCEDURE — 99999 PR PBB SHADOW E&M-EST. PATIENT-LVL II: CPT | Mod: PBBFAC,,, | Performed by: COLON & RECTAL SURGERY

## 2017-05-12 PROCEDURE — 99024 POSTOP FOLLOW-UP VISIT: CPT | Mod: S$GLB,,, | Performed by: COLON & RECTAL SURGERY

## 2017-05-12 NOTE — PROGRESS NOTES
S/p sigmoid colectomy on 3/28/2017 - T1 sigmoid colon cancer - no residual neoplasia in resection specimen.    Doing well - normal bowel function for her - q 3-4 days, formed.      Exam: /75 (BP Location: Left arm, Patient Position: Sitting, BP Method: Automatic)  Pulse (!) 56  Wt 55 kg (121 lb 4.1 oz)  BMI 22.18 kg/m2  Wd female NAD    A/p F/u scope in 1 yr. Stage 1 (T1) sigmoid colon cancer - removed endoscopically with no residual at segmental resection - extremely low risk for recurrence no surveillance other than cscope is warranted.

## 2017-05-12 NOTE — LETTER
May 12, 2017      [facility]  [address]  [facility] [address]  Phone: [phone]       Patient: Carmen Gray   YOB: 1965  Date of Visit: 05/12/2017    To Whom It May Concern:    Carmen Garcia was at [facility] on 05/12/2017. She may return to work/school on 05/22/2017 with no restrictions. If you have any questions or concerns, or if I can be of further assistance, please do not hesitate to contact me.    Sincerely,    Jessika Figueredo MA

## 2017-05-16 ENCOUNTER — TELEPHONE (OUTPATIENT)
Dept: SURGERY | Facility: CLINIC | Age: 52
End: 2017-05-16

## 2017-05-16 NOTE — TELEPHONE ENCOUNTER
----- Message from Sheela Saini sent at 5/16/2017  2:34 PM CDT -----  Contact: [facility] Stephanie:[phone]  [facility] Stephanie called and states she would like to know the the pt form that was faxed for dental clearance was received. Stephanie would like to know if it could be faxed.

## 2017-05-31 RX ORDER — GABAPENTIN 300 MG/1
CAPSULE ORAL
Qty: 30 CAPSULE | Refills: 0 | Status: SHIPPED | OUTPATIENT
Start: 2017-05-31 | End: 2018-01-31 | Stop reason: SDUPTHER

## 2018-01-23 ENCOUNTER — TELEPHONE (OUTPATIENT)
Dept: SURGERY | Facility: CLINIC | Age: 53
End: 2018-01-23

## 2018-01-23 DIAGNOSIS — C18.7 CANCER OF SIGMOID: Primary | ICD-10-CM

## 2018-01-23 NOTE — TELEPHONE ENCOUNTER
----- Message from My [place] MA sent at 1/23/2018  8:37 AM CST -----  Contact: self  [phone]  States she is scheduled this Friday, 1-26-18 for an appointment but states she actually needs a colonoscopy.  States this is scheduled incorrectly!  No order for a colonoscopy is in epic.

## 2018-01-23 NOTE — TELEPHONE ENCOUNTER
Spoke with patient regarding a colonoscopy.  It was ordered and patient was given number and transferred to the endoscopy department.

## 2018-01-24 DIAGNOSIS — C18.7 CANCER OF SIGMOID: Primary | ICD-10-CM

## 2018-01-24 RX ORDER — POLYETHYLENE GLYCOL 3350, SODIUM SULFATE ANHYDROUS, SODIUM BICARBONATE, SODIUM CHLORIDE, POTASSIUM CHLORIDE 236; 22.74; 6.74; 5.86; 2.97 G/4L; G/4L; G/4L; G/4L; G/4L
4 POWDER, FOR SOLUTION ORAL ONCE
Qty: 4000 ML | Refills: 0 | Status: SHIPPED | OUTPATIENT
Start: 2018-01-24 | End: 2018-01-24

## 2018-01-31 ENCOUNTER — OFFICE VISIT (OUTPATIENT)
Dept: INTERNAL MEDICINE | Facility: CLINIC | Age: 53
End: 2018-01-31
Payer: COMMERCIAL

## 2018-01-31 VITALS
BODY MASS INDEX: 23.98 KG/M2 | HEART RATE: 80 BPM | SYSTOLIC BLOOD PRESSURE: 118 MMHG | DIASTOLIC BLOOD PRESSURE: 80 MMHG | WEIGHT: 130.31 LBS | HEIGHT: 62 IN | OXYGEN SATURATION: 97 %

## 2018-01-31 DIAGNOSIS — F41.9 ANXIETY: ICD-10-CM

## 2018-01-31 DIAGNOSIS — Z23 NEED FOR PNEUMOCOCCAL VACCINATION: ICD-10-CM

## 2018-01-31 DIAGNOSIS — Z72.0 TOBACCO ABUSE: ICD-10-CM

## 2018-01-31 DIAGNOSIS — G47.00 INSOMNIA, UNSPECIFIED TYPE: ICD-10-CM

## 2018-01-31 DIAGNOSIS — Z76.89 ENCOUNTER TO ESTABLISH CARE: ICD-10-CM

## 2018-01-31 DIAGNOSIS — G25.81 RESTLESS LEG SYNDROME: ICD-10-CM

## 2018-01-31 DIAGNOSIS — Z00.00 ROUTINE HEALTH MAINTENANCE: Primary | ICD-10-CM

## 2018-01-31 DIAGNOSIS — Z23 NEED FOR INFLUENZA VACCINATION: ICD-10-CM

## 2018-01-31 PROCEDURE — 90471 IMMUNIZATION ADMIN: CPT | Mod: S$GLB,,, | Performed by: INTERNAL MEDICINE

## 2018-01-31 PROCEDURE — 90688 IIV4 VACCINE SPLT 0.5 ML IM: CPT | Mod: S$GLB,,, | Performed by: INTERNAL MEDICINE

## 2018-01-31 PROCEDURE — 90732 PPSV23 VACC 2 YRS+ SUBQ/IM: CPT | Mod: S$GLB,,, | Performed by: INTERNAL MEDICINE

## 2018-01-31 PROCEDURE — 90472 IMMUNIZATION ADMIN EACH ADD: CPT | Mod: S$GLB,,, | Performed by: INTERNAL MEDICINE

## 2018-01-31 PROCEDURE — 99396 PREV VISIT EST AGE 40-64: CPT | Mod: 25,S$GLB,, | Performed by: INTERNAL MEDICINE

## 2018-01-31 PROCEDURE — 99999 PR PBB SHADOW E&M-EST. PATIENT-LVL IV: CPT | Mod: PBBFAC,,, | Performed by: INTERNAL MEDICINE

## 2018-01-31 RX ORDER — VARENICLINE TARTRATE 0.5 (11)-1
KIT ORAL
Qty: 1 PACKAGE | Refills: 0 | Status: SHIPPED | OUTPATIENT
Start: 2018-01-31 | End: 2018-05-07 | Stop reason: SDUPTHER

## 2018-01-31 RX ORDER — VARENICLINE TARTRATE 1 MG/1
1 TABLET, FILM COATED ORAL 2 TIMES DAILY
Qty: 60 TABLET | Refills: 0 | Status: SHIPPED | OUTPATIENT
Start: 2018-02-28 | End: 2018-03-30

## 2018-01-31 RX ORDER — LORAZEPAM 0.5 MG/1
0.5 TABLET ORAL NIGHTLY PRN
Qty: 30 TABLET | Refills: 0 | Status: SHIPPED | OUTPATIENT
Start: 2018-01-31 | End: 2018-05-07 | Stop reason: SDUPTHER

## 2018-01-31 RX ORDER — GABAPENTIN 300 MG/1
CAPSULE ORAL
Qty: 30 CAPSULE | Refills: 6 | Status: SHIPPED | OUTPATIENT
Start: 2018-01-31 | End: 2018-05-07 | Stop reason: SDUPTHER

## 2018-01-31 NOTE — PROGRESS NOTES
Subjective:       Patient ID: Carmen Gray is a 52 y.o. female.    Chief Complaint: Establish [facility]    HPI Mrs. Gray is a 52-year-old female with anxiety, depression, insomnia, and restless leg syndrome and history of colon cancer who presents to establish care.  She is a former patient of Dr. Phan.  She states that Dr. Phan has left her practice and she needs to find another primary care doctor and that is why she is here today.  She is taking Neurontin for treatment of restless leg.  She also takes Ativan which she says was prescribed by her former PCPs for treatment of insomnia.  She says she often needs this medication to go to sleep at night. She has no complaints today.  She is requesting refills of her medications.  She would like to quit smoking.  She has tried Chantix in the past which worked well for her.  She has also tried nicotine replacement in the past but did not get much benefit from this.    Health maintenance: Patient is doing mammograms every 2 years.  She will be due for next mammogram in 1 year.  She has a history of colon cancer and had removal of the sigmoid colon performed in March 2017.  She is getting a repeat colonoscopy on Thursday.  She had her last Pap smear with Dr. Almazan.  We have looked at her Pap smear results together.  She had a normal Pap and was negative for HPV.  She has no history of abnormal Pap smears in the past.  Therefore she is not due for repeat Pap for 2 more years.    Past medical history: Anxiety, depression, insomnia, restless leg syndrome, history of colon cancer status post partial colectomy.  Past surgical history: Removal of sigmoid colon due to colon cancer, cholecystectomy, tubal ligation, hernia repair, gastric sleeve  Family history: Family history of thyroid disease.  Mother father and sibling with hypertension.  Social history: She is a current smoker but she is ready to quit.  She rarely uses alcohol.  No history of illegal drug  use.  ALLERGIES: Codeine and hydrocodone which causes itching.  Medications gabapentin, Ativan    Review of Systems    Objective:      Physical Exam   Constitutional: She is oriented to person, place, and time. She appears well-developed and well-nourished. No distress.   HENT:   Head: Normocephalic and atraumatic.   Cardiovascular: Normal rate, regular rhythm, normal heart sounds and intact distal pulses.  Exam reveals no gallop and no friction rub.    No murmur heard.  Pulmonary/Chest: Effort normal and breath sounds normal. No respiratory distress. She has no wheezes. She has no rales.   Abdominal: Soft. Bowel sounds are normal. She exhibits no distension and no mass. There is no tenderness. There is no rebound and no guarding.   Musculoskeletal: She exhibits no edema or deformity.   Neurological: She is alert and oriented to person, place, and time.   Skin: Skin is warm and dry. She is not diaphoretic.   Psychiatric: She has a normal mood and affect. Her behavior is normal. Judgment and thought content normal.   Vitals reviewed.      Assessment:       1. Routine health maintenance    2. Encounter to establish care    3. Tobacco abuse    4. Restless leg syndrome    5. Anxiety    6. Insomnia, unspecified type    7. Need for influenza vaccination    8. Need for pneumococcal vaccination        Plan:     #1 routine health maintenance, encounter to establish care  -Checking CMP, CBC, lipids, A1C  -Will order mammogram next year   -Getting colonoscopy on Thurs (is s/p sigmoid resection due to colon cancer)  -Getting flu and pneumonia vaccines today    #2 Tobacco abuse  -Referring to smoking cessation  -Prescribing chantix    #3Restless leg syndrome  -Taking gabapentin, Continue current medication    #4 Anxiety, Insomnia  -Discussed with patient that I do not prescribe benzos long term/chronically. I can prescribe a one month supply so that she is not without her medication well she waits for a psychiatry appointment.   I will not however prescribe more than 1 month supply.  And she is aware of this.  -Referral to psychiatry placed    RTC 6 months

## 2018-02-08 ENCOUNTER — ANESTHESIA EVENT (OUTPATIENT)
Dept: ENDOSCOPY | Facility: HOSPITAL | Age: 53
End: 2018-02-08
Payer: COMMERCIAL

## 2018-02-08 ENCOUNTER — HOSPITAL ENCOUNTER (OUTPATIENT)
Facility: HOSPITAL | Age: 53
Discharge: HOME | End: 2018-02-08
Attending: COLON & RECTAL SURGERY | Admitting: COLON & RECTAL SURGERY
Payer: COMMERCIAL

## 2018-02-08 ENCOUNTER — SURGERY (OUTPATIENT)
Age: 53
End: 2018-02-08

## 2018-02-08 ENCOUNTER — ANESTHESIA (OUTPATIENT)
Dept: ENDOSCOPY | Facility: HOSPITAL | Age: 53
End: 2018-02-08
Payer: COMMERCIAL

## 2018-02-08 VITALS
WEIGHT: 127 LBS | HEIGHT: 62 IN | RESPIRATION RATE: 14 BRPM | TEMPERATURE: 98 F | BODY MASS INDEX: 23.37 KG/M2 | SYSTOLIC BLOOD PRESSURE: 112 MMHG | DIASTOLIC BLOOD PRESSURE: 60 MMHG | HEART RATE: 54 BPM | OXYGEN SATURATION: 100 %

## 2018-02-08 DIAGNOSIS — Z12.11 SCREENING FOR COLON CANCER: ICD-10-CM

## 2018-02-08 PROCEDURE — 25000003 PHARM REV CODE 250: Performed by: NURSE PRACTITIONER

## 2018-02-08 PROCEDURE — 27201089 HC SNARE, DISP (ANY): Performed by: COLON & RECTAL SURGERY

## 2018-02-08 PROCEDURE — D9220A PRA ANESTHESIA: Mod: 33,CRNA,, | Performed by: NURSE ANESTHETIST, CERTIFIED REGISTERED

## 2018-02-08 PROCEDURE — 45381 COLONOSCOPY SUBMUCOUS NJX: CPT | Mod: 51,,, | Performed by: COLON & RECTAL SURGERY

## 2018-02-08 PROCEDURE — 88305 TISSUE EXAM BY PATHOLOGIST: CPT | Performed by: PATHOLOGY

## 2018-02-08 PROCEDURE — 45385 COLONOSCOPY W/LESION REMOVAL: CPT | Performed by: COLON & RECTAL SURGERY

## 2018-02-08 PROCEDURE — 45385 COLONOSCOPY W/LESION REMOVAL: CPT | Mod: 33,,, | Performed by: COLON & RECTAL SURGERY

## 2018-02-08 PROCEDURE — 88305 TISSUE EXAM BY PATHOLOGIST: CPT | Mod: 26,,, | Performed by: PATHOLOGY

## 2018-02-08 PROCEDURE — 37000008 HC ANESTHESIA 1ST 15 MINUTES: Performed by: COLON & RECTAL SURGERY

## 2018-02-08 PROCEDURE — 45381 COLONOSCOPY SUBMUCOUS NJX: CPT | Performed by: COLON & RECTAL SURGERY

## 2018-02-08 PROCEDURE — D9220A PRA ANESTHESIA: Mod: 33,ANES,, | Performed by: ANESTHESIOLOGY

## 2018-02-08 PROCEDURE — 37000009 HC ANESTHESIA EA ADD 15 MINS: Performed by: COLON & RECTAL SURGERY

## 2018-02-08 PROCEDURE — 63600175 PHARM REV CODE 636 W HCPCS: Performed by: NURSE ANESTHETIST, CERTIFIED REGISTERED

## 2018-02-08 RX ORDER — PROPOFOL 10 MG/ML
INJECTION, EMULSION INTRAVENOUS
Status: DISCONTINUED | OUTPATIENT
Start: 2018-02-08 | End: 2018-02-08

## 2018-02-08 RX ORDER — PROPOFOL 10 MG/ML
INJECTION, EMULSION INTRAVENOUS CONTINUOUS PRN
Status: DISCONTINUED | OUTPATIENT
Start: 2018-02-08 | End: 2018-02-08

## 2018-02-08 RX ORDER — SODIUM CHLORIDE 9 MG/ML
INJECTION, SOLUTION INTRAVENOUS CONTINUOUS
Status: DISCONTINUED | OUTPATIENT
Start: 2018-02-08 | End: 2018-02-08 | Stop reason: HOSPADM

## 2018-02-08 RX ORDER — LIDOCAINE HCL/PF 100 MG/5ML
SYRINGE (ML) INTRAVENOUS
Status: DISCONTINUED | OUTPATIENT
Start: 2018-02-08 | End: 2018-02-08

## 2018-02-08 RX ADMIN — PROPOFOL 50 MG: 10 INJECTION, EMULSION INTRAVENOUS at 09:02

## 2018-02-08 RX ADMIN — SODIUM CHLORIDE: 0.9 INJECTION, SOLUTION INTRAVENOUS at 09:02

## 2018-02-08 RX ADMIN — LIDOCAINE HYDROCHLORIDE 50 MG: 20 INJECTION, SOLUTION INTRAVENOUS at 09:02

## 2018-02-08 RX ADMIN — PROPOFOL 200 MCG/KG/MIN: 10 INJECTION, EMULSION INTRAVENOUS at 09:02

## 2018-02-08 RX ADMIN — PROPOFOL 30 MG: 10 INJECTION, EMULSION INTRAVENOUS at 09:02

## 2018-02-08 NOTE — DISCHARGE INSTRUCTIONS
Colonoscopy     A camera attached to a flexible tube with a viewing lens is used to take video pictures.     Colonoscopy is a test to view the inside of your lower digestive tract (colon and rectum). Sometimes it can show the last part of the small intestine (ileum). During the test, small pieces of tissue may be removed for testing. This is called a biopsy. Small growths, such as polyps, may also be removed.   Why is colonoscopy done?  The test is done to help look for colon cancer. And it can help find the source of abdominal pain, bleeding, and changes in bowel habits. It may be needed once a year, depending on factors such as your:  · Age  · Health history  · Family health history  · Symptoms  · Results from any prior colonoscopy  Risks and possible complications  These include:  · Bleeding               · A puncture or tear in the colon   · Risks of anesthesia  · A cancer lesion not being seen  Getting ready   To prepare for the test:  · Talk with your healthcare provider about the risks of the test (see below). Also ask your healthcare provider about alternatives to the test.  · Tell your healthcare provider about any medicines you take. Also tell him or her about any health conditions you may have.  · Make sure your rectum and colon are empty for the test. Follow the diet and bowel prep instructions exactly. If you dont, the test may need to be rescheduled.  · Plan for a friend or family member to drive you home after the test.     Colonoscopy provides an inside view of the entire colon.     You may discuss the results with your doctor right away or at a future visit.  During the test   The test is usually done in the hospital on an outpatient basis. This means you go home the same day. The procedure takes about 30 minutes. During that time:  · You are given relaxing (sedating) medicine through an IV line. You may be drowsy, or fully asleep.  · The healthcare provider will first give you a physical exam to  check for anal and rectal problems.  · Then the anus is lubricated and the scope inserted.  · If you are awake, you may have a feeling similar to needing to have a bowel movement. You may also feel pressure as air is pumped into the colon. Its OK to pass gas during the procedure.  · Biopsy, polyp removal, or other treatments may be done during the test.  After the test   You may have gas right after the test. It can help to try to pass it to help prevent later bloating. Your healthcare provider may discuss the results with you right away. Or you may need to schedule a follow-up visit to talk about the results. After the test, you can go back to your normal eating and other activities. You may be tired from the sedation and need to rest for a few hours.  Date Last Reviewed: 11/1/2016 © 2000-2017 [employer]. [address] [place] [address], [place] [address]. All rights reserved. This information is not intended as a substitute for professional medical care. Always follow your healthcare professional's instructions.

## 2018-02-08 NOTE — ANESTHESIA POSTPROCEDURE EVALUATION
"Anesthesia Post Evaluation    Patient: Carmen Gray    Procedure(s) Performed: Procedure(s) (LRB):  COLONOSCOPY (N/A)    Final Anesthesia Type: general  Patient location during evaluation: PACU  Patient participation: Yes- Able to Participate  Level of consciousness: awake and alert and oriented  Post-procedure vital signs: reviewed and stable  Pain management: adequate  Airway patency: patent  PONV status at discharge: No PONV  Anesthetic complications: no      Cardiovascular status: stable  Respiratory status: unassisted  Hydration status: euvolemic  Follow-up not needed.        Visit Vitals  /60 (BP Location: Left arm, Patient Position: Lying)   Pulse (!) 54   Temp 36.4 °C (97.5 °F) (Temporal)   Resp 14   Ht 5' 2" (1.575 m)   Wt 57.6 kg (127 lb)   SpO2 100%   Breastfeeding? No   BMI 23.23 kg/m²       Pain/Rafal Score: Pain Assessment Performed: Yes (2/8/2018 10:31 AM)  Presence of Pain: denies (2/8/2018 10:50 AM)  Rafal Score: 10 (2/8/2018 10:50 AM)      "

## 2018-02-08 NOTE — PROVATION PATIENT INSTRUCTIONS
Discharge Summary/Instructions after an Endoscopic Procedure  Patient Name: Carmen Gray  Patient MRN: [id number]  Patient YOB: 1965  Thursday, February 08, 2018  Polo Abel MD  RESTRICTIONS:  During your procedure today, you received medications for sedation.  These   medications may affect your judgment, balance and coordination.  Therefore,   for 24 hours, you have the following restrictions:   - DO NOT drive a car, operate machinery, make legal/financial decisions,   sign important papers or drink alcohol.    ACTIVITY:  The following day: return to full activity including work, except no heavy   lifting, straining or running for 3 days if polyps were removed.  DIET:  Eat and drink normally unless instructed otherwise.     TREATMENT FOR COMMON SIDE EFFECTS:  - Mild abdominal pain, belching, bloating or excessive gas: rest, eat   lightly and use a heating pad.  - Sore Throat: treat with throat lozenges and/or gargle with warm salt   water.  SYMPTOMS TO WATCH FOR AND REPORT TO YOUR PHYSICIAN:  1. Abdominal pain or bloating, other than gas cramps.  2. Chest pain.  3. Back pain.  4. Chills or fever occurring within 24 hours after the procedure.  5. Rectal bleeding, which would show as bright red, maroon, or black stools.   (A tablespoon of blood from the rectum is not serious, especially if   hemorrhoids are present.)  6. Vomiting.  7. Weakness or dizziness.  8. Because air was used during the procedure, expelling large amounts of air   from your rectum or belching is normal.  9. If a bowel prep was taken, you may not have a bowel movement for 1-3   days.  This is normal.  GO DIRECTLY TO THE NEAREST EMERGENCY ROOM IF YOU HAVE ANY OF THE FOLLOWING:      Difficulty breathing  Chills and/or fever over 101 F   Persistent vomiting and/or vomiting blood   Severe abdominal pain   Severe chest pain   Black, tarry stools   Bleeding- more than one tablespoon   Any other symptom or condition that you may feel  needs urgent attention  Your doctor recommends these additional instructions:  If any biopsies were taken, your doctor s clinic will contact you in 1 to 2   weeks with any results.  Your physician has recommended a repeat colonoscopy in one year for   surveillance.   You have a contact number available for emergencies.  The signs and symptoms   of potential delayed complications were discussed with you.  You may return   to normal activities tomorrow.  Written discharge instructions were   provided to you.   Resume your regular diet indefinitely.   You are being discharged to home.   Continue your present medications.  For questions, problems or results please call your physician - Polo Abel MD at Work:  [phone].  [facility], EMERGENCY ROOM PHONE NUMBER: [phone]  IF A COMPLICATION OR EMERGENCY SITUATION ARISES AND YOU ARE UNABLE TO REACH   YOUR PHYSICIAN - GO DIRECTLY TO THE EMERGENCY ROOM.  Polo Abel MD  2/8/2018 10:16:33 AM  This report has been verified and signed electronically.

## 2018-02-08 NOTE — TRANSFER OF CARE
"Anesthesia Transfer of Care Note    Patient: Carmen Gray    Procedure(s) Performed: Procedure(s) (LRB):  COLONOSCOPY (N/A)    Patient location: PACU    Anesthesia Type: general    Transport from OR: Transported from OR on room air with adequate spontaneous ventilation    Post pain: adequate analgesia    Post assessment: no apparent anesthetic complications and tolerated procedure well    Post vital signs: stable    Level of consciousness: awake    Nausea/Vomiting: no nausea/vomiting    Complications: none    Transfer of care protocol was followed      Last vitals:   Visit Vitals  BP 99/65 (BP Location: Left arm, Patient Position: Lying)   Pulse 80   Temp 36.8 °C (98.3 °F) (Temporal)   Resp 18   Ht 5' 2" (1.575 m)   Wt 57.6 kg (127 lb)   SpO2 100%   Breastfeeding? No   BMI 23.23 kg/m²     "
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
## 2018-02-08 NOTE — H&P
Endoscopy H&P    Procedure : Colonoscopy      History of lap sigmoid colectomy  3/28/2017 after colonoscopy on 2/7/2017 showed large polyp ~20cm from anus incompletely removed with moderately differentiated invasive adenoca. Final pathology of surgery specimen without neoplasm and 0/17 LN involved.      Past Medical History:   Diagnosis Date    Anxiety     Depression     Insomnia     Restless leg syndrome              Review of Systems -ROS:  GENERAL: No fever, chills, fatigability or weight loss.  CHEST: Denies CALVERT, cyanosis, wheezing, cough and sputum production.  CARDIOVASCULAR: Denies chest pain, PND, orthopnea or reduced exercise tolerance.   Musculoskeletal ROS: negative for - gait disturbance or joint pain  Neurological ROS: negative for - confusion or memory loss        Physical Exam:  General: well developed, well nourished, no distress  Head: normocephalic  Neck: supple, symmetrical, trachea midline  Lungs:  clear to auscultation bilaterally and normal respiratory effort  Heart: regular rate and rhythm, S1, S2 normal, no murmur, rub or gallop and regular rate and rhythm  Abdomen: soft, non-tender non-distented; bowel sounds normal; no masses,  no organomegaly  Extremities: no cyanosis or edema, or clubbing       Moderate Sedation (choice): Mallampati Score 1    ASA : II    IMP: As above    Plan: Colonoscopy with Moderate sedation.  I have explained the procedure including indications, alternatives, expected outcomes and potential complications. The patient appears to understand and gives informed consent. The patient is medically ready for surgery.

## 2018-02-15 ENCOUNTER — TELEPHONE (OUTPATIENT)
Dept: ENDOSCOPY | Facility: HOSPITAL | Age: 53
End: 2018-02-15
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
## 2018-05-07 ENCOUNTER — LAB VISIT (OUTPATIENT)
Dept: LAB | Facility: HOSPITAL | Age: 53
End: 2018-05-07
Attending: FAMILY MEDICINE
Payer: COMMERCIAL

## 2018-05-07 ENCOUNTER — OFFICE VISIT (OUTPATIENT)
Dept: FAMILY MEDICINE | Facility: CLINIC | Age: 53
End: 2018-05-07
Attending: FAMILY MEDICINE
Payer: COMMERCIAL

## 2018-05-07 VITALS
HEIGHT: 62 IN | OXYGEN SATURATION: 99 % | SYSTOLIC BLOOD PRESSURE: 120 MMHG | WEIGHT: 126.56 LBS | DIASTOLIC BLOOD PRESSURE: 76 MMHG | HEART RATE: 66 BPM | TEMPERATURE: 99 F | BODY MASS INDEX: 23.29 KG/M2

## 2018-05-07 DIAGNOSIS — R53.83 FATIGUE, UNSPECIFIED TYPE: ICD-10-CM

## 2018-05-07 DIAGNOSIS — G47.00 INSOMNIA, UNSPECIFIED TYPE: ICD-10-CM

## 2018-05-07 DIAGNOSIS — R53.83 FATIGUE, UNSPECIFIED TYPE: Primary | ICD-10-CM

## 2018-05-07 DIAGNOSIS — R42 DIZZINESS: ICD-10-CM

## 2018-05-07 DIAGNOSIS — C18.7 MALIGNANT NEOPLASM OF SIGMOID COLON: ICD-10-CM

## 2018-05-07 DIAGNOSIS — Z72.0 TOBACCO ABUSE: ICD-10-CM

## 2018-05-07 DIAGNOSIS — Z83.49 FAMILY HISTORY OF THYROID DISEASE: ICD-10-CM

## 2018-05-07 DIAGNOSIS — K63.5 POLYP OF COLON, UNSPECIFIED PART OF COLON, UNSPECIFIED TYPE: ICD-10-CM

## 2018-05-07 DIAGNOSIS — F41.9 ANXIETY: ICD-10-CM

## 2018-05-07 DIAGNOSIS — G25.81 RESTLESS LEG SYNDROME: ICD-10-CM

## 2018-05-07 PROBLEM — Z12.11 SCREEN FOR COLON CANCER: Status: RESOLVED | Noted: 2017-02-07 | Resolved: 2018-05-07

## 2018-05-07 PROBLEM — Z12.11 SCREENING FOR COLON CANCER: Status: RESOLVED | Noted: 2018-02-08 | Resolved: 2018-05-07

## 2018-05-07 LAB
25(OH)D3+25(OH)D2 SERPL-MCNC: 26 NG/ML
ALBUMIN SERPL BCP-MCNC: 4 G/DL
ALP SERPL-CCNC: 90 U/L
ALT SERPL W/O P-5'-P-CCNC: 10 U/L
ANION GAP SERPL CALC-SCNC: 8 MMOL/L
AST SERPL-CCNC: 19 U/L
BASOPHILS # BLD AUTO: 0.01 K/UL
BASOPHILS NFR BLD: 0.2 %
BILIRUB SERPL-MCNC: 0.4 MG/DL
BUN SERPL-MCNC: 10 MG/DL
CALCIUM SERPL-MCNC: 9.7 MG/DL
CHLORIDE SERPL-SCNC: 102 MMOL/L
CHOLEST SERPL-MCNC: 166 MG/DL
CHOLEST/HDLC SERPL: 2.6 {RATIO}
CO2 SERPL-SCNC: 30 MMOL/L
CREAT SERPL-MCNC: 0.7 MG/DL
DIFFERENTIAL METHOD: ABNORMAL
EOSINOPHIL # BLD AUTO: 0.1 K/UL
EOSINOPHIL NFR BLD: 1.5 %
ERYTHROCYTE [DISTWIDTH] IN BLOOD BY AUTOMATED COUNT: 12.8 %
EST. GFR  (AFRICAN AMERICAN): >60 ML/MIN/1.73 M^2
EST. GFR  (NON AFRICAN AMERICAN): >60 ML/MIN/1.73 M^2
GLUCOSE SERPL-MCNC: 81 MG/DL
HCT VFR BLD AUTO: 39.5 %
HDLC SERPL-MCNC: 64 MG/DL
HDLC SERPL: 38.6 %
HGB BLD-MCNC: 12.9 G/DL
LDLC SERPL CALC-MCNC: 91.2 MG/DL
LYMPHOCYTES # BLD AUTO: 3.4 K/UL
LYMPHOCYTES NFR BLD: 52.3 %
MAGNESIUM SERPL-MCNC: 2.1 MG/DL
MCH RBC QN AUTO: 30.7 PG
MCHC RBC AUTO-ENTMCNC: 32.7 G/DL
MCV RBC AUTO: 94 FL
MONOCYTES # BLD AUTO: 0.4 K/UL
MONOCYTES NFR BLD: 6.8 %
NEUTROPHILS # BLD AUTO: 2.5 K/UL
NEUTROPHILS NFR BLD: 39.2 %
NONHDLC SERPL-MCNC: 102 MG/DL
PHOSPHATE SERPL-MCNC: 4.3 MG/DL
PLATELET # BLD AUTO: 230 K/UL
PMV BLD AUTO: 10.9 FL
POTASSIUM SERPL-SCNC: 4.3 MMOL/L
PROT SERPL-MCNC: 7.9 G/DL
RBC # BLD AUTO: 4.2 M/UL
SODIUM SERPL-SCNC: 140 MMOL/L
THYROPEROXIDASE IGG SERPL-ACNC: <6 IU/ML
TRIGL SERPL-MCNC: 54 MG/DL
TSH SERPL DL<=0.005 MIU/L-ACNC: 1.29 UIU/ML
VIT B12 SERPL-MCNC: 292 PG/ML
WBC # BLD AUTO: 6.46 K/UL

## 2018-05-07 PROCEDURE — 85025 COMPLETE CBC W/AUTO DIFF WBC: CPT

## 2018-05-07 PROCEDURE — 93010 EKG 12-LEAD: ICD-10-PCS | Mod: ,,, | Performed by: INTERNAL MEDICINE

## 2018-05-07 PROCEDURE — 86038 ANTINUCLEAR ANTIBODIES: CPT

## 2018-05-07 PROCEDURE — 80053 COMPREHEN METABOLIC PANEL: CPT

## 2018-05-07 PROCEDURE — 93005 ELECTROCARDIOGRAM TRACING: CPT

## 2018-05-07 PROCEDURE — 84443 ASSAY THYROID STIM HORMONE: CPT

## 2018-05-07 PROCEDURE — 86376 MICROSOMAL ANTIBODY EACH: CPT

## 2018-05-07 PROCEDURE — 80061 LIPID PANEL: CPT

## 2018-05-07 PROCEDURE — 99214 OFFICE O/P EST MOD 30 MIN: CPT | Mod: S$GLB,,, | Performed by: FAMILY MEDICINE

## 2018-05-07 PROCEDURE — 84100 ASSAY OF PHOSPHORUS: CPT

## 2018-05-07 PROCEDURE — 82306 VITAMIN D 25 HYDROXY: CPT

## 2018-05-07 PROCEDURE — 36415 COLL VENOUS BLD VENIPUNCTURE: CPT

## 2018-05-07 PROCEDURE — 83735 ASSAY OF MAGNESIUM: CPT

## 2018-05-07 PROCEDURE — 3008F BODY MASS INDEX DOCD: CPT | Mod: CPTII,S$GLB,, | Performed by: FAMILY MEDICINE

## 2018-05-07 PROCEDURE — 93010 ELECTROCARDIOGRAM REPORT: CPT | Mod: ,,, | Performed by: INTERNAL MEDICINE

## 2018-05-07 PROCEDURE — 82607 VITAMIN B-12: CPT

## 2018-05-07 PROCEDURE — 99999 PR PBB SHADOW E&M-EST. PATIENT-LVL III: CPT | Mod: PBBFAC,,, | Performed by: FAMILY MEDICINE

## 2018-05-07 RX ORDER — LORAZEPAM 0.5 MG/1
0.5 TABLET ORAL NIGHTLY PRN
Qty: 30 TABLET | Refills: 0 | Status: ON HOLD | OUTPATIENT
Start: 2018-05-07 | End: 2019-05-20

## 2018-05-07 RX ORDER — VARENICLINE TARTRATE 0.5 (11)-1
KIT ORAL
Qty: 1 PACKAGE | Refills: 0 | Status: SHIPPED | OUTPATIENT
Start: 2018-05-07 | End: 2020-07-30

## 2018-05-07 RX ORDER — GABAPENTIN 300 MG/1
CAPSULE ORAL
Qty: 90 CAPSULE | Refills: 3 | Status: SHIPPED | OUTPATIENT
Start: 2018-05-07 | End: 2019-05-07 | Stop reason: SDUPTHER

## 2018-05-07 NOTE — PROGRESS NOTES
Subjective:       Patient ID: Carmen Gray is a 52 y.o. female.    Chief Complaint: Dizziness and Fatigue    52 yr old pleasant white female with restless legs syndrome, insomnia, colon cA now in remission, presents today as new patient to me and for establishing primary care and also evaluation of dizziness and chronic fatigue. She also passed out last week when she was out for ja[employer] fest and was dehydrated and stayed in tent for 3 hours. She is drinking a lot of fluids now. Still tiredness and dizziness is not getting better. She also have daily headaches. She further suffers from Insomnia and she takes half of lorazepam very sparingly. She denies any anxiety/depression. RLS is better on neurontin. Details as follows -    History as below - reviewed       Fatigue   This is a chronic problem. The current episode started more than 1 month ago. The problem occurs constantly. The problem has been unchanged. Associated symptoms include fatigue. Pertinent negatives include no arthralgias, chest pain, congestion, diaphoresis, headaches, myalgias, nausea or sore throat. Nothing aggravates the symptoms. She has tried nothing for the symptoms. The treatment provided no relief.   Dizziness:   Chronicity:  Chronic  Progression since onset:  Gradually worsening and unchanged  Frequency:  Every few minutes  Pain Scale:  3/10  Severity:  Mild  Duration:  Very brief  Dizziness characteristics:  Sensation of movementno hearing loss, no headaches, no nausea, no diaphoresis and no chest pain.  Aggravated by:  Nothing  Treatments tried:  Rest  Improvements on treatment:  No relief and mildno strokes, no neurologic disease, no head trauma, no balance testing, no ear trauma, no head trauma, no anxiety, no ear tubes, no environmental allergies and no CT head.    Review of Systems   Constitutional: Positive for fatigue. Negative for activity change, diaphoresis and unexpected weight change.   HENT: Negative.  Negative for congestion, ear  discharge, hearing loss, rhinorrhea, sore throat and voice change.    Eyes: Negative.  Negative for pain, discharge and visual disturbance.   Respiratory: Negative.  Negative for chest tightness, shortness of breath and wheezing.    Cardiovascular: Negative.  Negative for chest pain.   Gastrointestinal: Negative.  Negative for abdominal distention, anal bleeding, constipation and nausea.   Endocrine: Negative.  Negative for cold intolerance, polydipsia and polyuria.   Genitourinary: Negative.  Negative for decreased urine volume, difficulty urinating, dysuria, frequency, menstrual problem and vaginal pain.   Musculoskeletal: Negative.  Negative for arthralgias, gait problem and myalgias.   Skin: Negative.  Negative for color change, pallor and wound.   Allergic/Immunologic: Negative.  Negative for environmental allergies and immunocompromised state.   Neurological: Positive for dizziness. Negative for tremors, seizures, speech difficulty and headaches.   Hematological: Negative.  Negative for adenopathy. Does not bruise/bleed easily.   Psychiatric/Behavioral: Negative.  Negative for agitation, confusion, decreased concentration, hallucinations, self-injury and suicidal ideas. The patient is not nervous/anxious.        PMH/PSH/FH/SH/MED/ALLERGY reviewed  Active Ambulatory Problems     Diagnosis Date Noted    Insomnia     Restless leg syndrome 01/17/2017    Tobacco abuse 01/17/2017    Colon polyp 03/28/2017    Malignant neoplasm of sigmoid colon 05/07/2018     Resolved Ambulatory Problems     Diagnosis Date Noted    Depression     Anxiety     Screen for colon cancer 02/07/2017    Screening for colon cancer 02/08/2018     Past Medical History:   Diagnosis Date    Anxiety     Depression     Insomnia     Restless leg syndrome      Past Surgical History:   Procedure Laterality Date    CHOLECYSTECTOMY      COLONOSCOPY N/A 2/7/2017    Procedure: COLONOSCOPY  split;  Surgeon: Dallas Mar MD;   [facility];  Service: Endoscopy;  Laterality: N/A;    COLONOSCOPY N/A 2/8/2018    Procedure: COLONOSCOPY;  Surgeon: Polo Abel MD;  Location: [facility] ENDO (4TH FLR);  Service: Endoscopy;  Laterality: N/A;    gastric sleeve  2015    HERNIA REPAIR      hiatal     TUBAL LIGATION       Family History   Problem Relation Age of Onset    Hypertension Mother     Hypertension Father     Hypertension Brother     Thyroid disease Unknown     Anesthesia problems Neg Hx      Social History     Social History    Marital status:      Spouse name: N/A    Number of children: N/A    Years of education: N/A     Occupational History    Not on file.     Social History Main Topics    Smoking status: Current Every Day Smoker     Packs/day: 0.50     Years: 30.00     Types: Cigarettes    Smokeless tobacco: Never Used    Alcohol use Yes      Comment: social (history of heavy drinking around the time of her divorce)    Drug use: No    Sexual activity: Not on file     Other Topics Concern    Not on file     Social History Narrative    No narrative on file     Review of patient's allergies indicates:   Allergen Reactions    Codeine Itching    Hydrocodone Itching     Current Outpatient Prescriptions on File Prior to Visit   Medication Sig Dispense Refill    RESTASIS 0.05 % ophthalmic emulsion Place 1 drop into both eyes every evening.       [DISCONTINUED] gabapentin (NEURONTIN) 300 MG capsule TAKE 1 CAPSULE(300 MG) BY MOUTH EVERY EVENING 30 capsule 6    [DISCONTINUED] LORazepam (ATIVAN) 0.5 MG tablet Take 1 tablet (0.5 mg total) by mouth nightly as needed for Anxiety. 30 tablet 0    [DISCONTINUED] varenicline (CHANTIX STARTING MONTH BOX) 0.5 mg (11)- 1 mg (42) tablet Take one 0.5mg tab by mouth once daily X3 days,then increase to one 0.5mg tab twice daily X4 days,then increase to one 1mg tab twice daily 1 Package 0     No current facility-administered medications on file prior to visit.         Objective:       Vitals:    05/07/18 1310   BP: 120/76   Pulse: 66   Temp: 98.7 °F (37.1 °C)       Physical Exam   Constitutional: She is oriented to person, place, and time. She appears well-developed and well-nourished. No distress.   HENT:   Head: Normocephalic and atraumatic.   Right Ear: External ear normal.   Left Ear: External ear normal.   Nose: Nose normal.   Mouth/Throat: Oropharynx is clear and moist. No oropharyngeal exudate.   Eyes: Conjunctivae and EOM are normal. Pupils are equal, round, and reactive to light. Right eye exhibits no discharge. Left eye exhibits no discharge. No scleral icterus.   Neck: Normal range of motion. Neck supple. No JVD present. No tracheal deviation present. No thyromegaly present.   Cardiovascular: Normal rate, regular rhythm, normal heart sounds and intact distal pulses.  Exam reveals no gallop and no friction rub.    No murmur heard.  Pulmonary/Chest: Effort normal and breath sounds normal. No stridor. She has no wheezes. She has no rales. She exhibits no tenderness.   Abdominal: Soft. Bowel sounds are normal. She exhibits no distension and no mass. There is no tenderness. There is no rebound and no guarding. No hernia.   Musculoskeletal: Normal range of motion. She exhibits no edema or tenderness.   Lymphadenopathy:     She has no cervical adenopathy.   Neurological: She is alert and oriented to person, place, and time. She has normal reflexes. She displays normal reflexes. No cranial nerve deficit. She exhibits normal muscle tone. Coordination normal.   Skin: Skin is warm and dry. No rash noted. She is not diaphoretic. No erythema. No pallor.   Psychiatric: She has a normal mood and affect. Her behavior is normal. Judgment and thought content normal.       Assessment:       1. Fatigue, unspecified type    2. Malignant neoplasm of sigmoid colon    3. Insomnia, unspecified type    4. Polyp of colon, unspecified part of colon, unspecified type    5. Restless leg  syndrome    6. Tobacco abuse    7. Family history of thyroid disease    8. Dizziness    9. Anxiety        Plan:       Carmen was seen today for dizziness and fatigue.    Diagnoses and all orders for this visit:    Fatigue, unspecified type  -     CBC auto differential; Future  -     Comprehensive metabolic panel; Future  -     Lipid panel; Future  -     Magnesium; Future  -     PHOSPHORUS; Future  -     TSH; Future  -     Vitamin D; Future  -     Vitamin B12; Future  -     Urinalysis; Future  -     FLOR; Future    Malignant neoplasm of sigmoid colon    Insomnia, unspecified type  -     TSH; Future  -     LORazepam (ATIVAN) 0.5 MG tablet; Take 1 tablet (0.5 mg total) by mouth nightly as needed for Anxiety.    Polyp of colon, unspecified part of colon, unspecified type    Restless leg syndrome  -     gabapentin (NEURONTIN) 300 MG capsule; TAKE 1 CAPSULE(300 MG) BY MOUTH EVERY EVENING    Tobacco abuse  -     varenicline (CHANTIX STARTING MONTH BOX) 0.5 mg (11)- 1 mg (42) tablet; Take one 0.5mg tab by mouth once daily X3 days,then increase to one 0.5mg tab twice daily X4 days,then increase to one 1mg tab twice daily    Family history of thyroid disease  -     Thyroid peroxidase antibody; Future    Dizziness  -     CBC auto differential; Future  -     Comprehensive metabolic panel; Future  -     Magnesium; Future  -     PHOSPHORUS; Future  -     TSH; Future  -     Vitamin D; Future  -     SCHEDULED EKG 12-LEAD (to Muse); Future  -     Urinalysis; Future  -     FLOR; Future    Anxiety  -     LORazepam (ATIVAN) 0.5 MG tablet; Take 1 tablet (0.5 mg total) by mouth nightly as needed for Anxiety.      Anxiety/insomnia  -controlled  -ativan as needed sparingly    Dizziness/fatigue  -unclear etiology  -labs and EKG    Colon CA  -now in remission    RLS  -controlled    Tobacco use  -ready to quit  -chantix given  -1 min counseling done    Spent adequate time in obtaining history and explaining differentials    40 minutes spent  during this visit of which greater than 50% devoted to face-face counseling and coordination of care regarding diagnosis and management plan    Follow-up in about 4 weeks (around 6/4/2018), or if symptoms worsen or fail to improve.

## 2018-05-08 ENCOUNTER — PATIENT MESSAGE (OUTPATIENT)
Dept: FAMILY MEDICINE | Facility: CLINIC | Age: 53
End: 2018-05-08

## 2018-05-08 LAB — ANA SER QL IF: NORMAL

## 2018-05-08 RX ORDER — CIPROFLOXACIN 500 MG/1
500 TABLET ORAL 2 TIMES DAILY
Qty: 14 TABLET | Refills: 0 | Status: SHIPPED | OUTPATIENT
Start: 2018-05-08 | End: 2018-05-15

## 2019-01-02 ENCOUNTER — HOSPITAL ENCOUNTER (EMERGENCY)
Facility: HOSPITAL | Age: 54
Discharge: HOME | End: 2019-01-02
Attending: EMERGENCY MEDICINE
Payer: COMMERCIAL

## 2019-01-02 VITALS
DIASTOLIC BLOOD PRESSURE: 67 MMHG | WEIGHT: 126 LBS | BODY MASS INDEX: 23.19 KG/M2 | SYSTOLIC BLOOD PRESSURE: 125 MMHG | HEART RATE: 49 BPM | HEIGHT: 62 IN | TEMPERATURE: 98 F | OXYGEN SATURATION: 100 % | RESPIRATION RATE: 20 BRPM

## 2019-01-02 DIAGNOSIS — R06.02 SHORTNESS OF BREATH: ICD-10-CM

## 2019-01-02 DIAGNOSIS — E86.0 DEHYDRATION: ICD-10-CM

## 2019-01-02 DIAGNOSIS — R51.9 HEADACHE: Primary | ICD-10-CM

## 2019-01-02 LAB
ALBUMIN SERPL BCP-MCNC: 3.9 G/DL
ALP SERPL-CCNC: 88 U/L
ALT SERPL W/O P-5'-P-CCNC: 10 U/L
ANION GAP SERPL CALC-SCNC: 11 MMOL/L
AST SERPL-CCNC: 17 U/L
BACTERIA #/AREA URNS HPF: NORMAL /HPF
BASOPHILS # BLD AUTO: 0.01 K/UL
BASOPHILS NFR BLD: 0.2 %
BILIRUB SERPL-MCNC: 0.3 MG/DL
BILIRUB UR QL STRIP: NEGATIVE
BUN SERPL-MCNC: 11 MG/DL
CALCIUM SERPL-MCNC: 9.6 MG/DL
CHLORIDE SERPL-SCNC: 104 MMOL/L
CLARITY UR: CLEAR
CO2 SERPL-SCNC: 25 MMOL/L
COLOR UR: YELLOW
CREAT SERPL-MCNC: 0.7 MG/DL
CRP SERPL-MCNC: 1.2 MG/L
DIFFERENTIAL METHOD: ABNORMAL
EOSINOPHIL # BLD AUTO: 0.1 K/UL
EOSINOPHIL NFR BLD: 1.2 %
ERYTHROCYTE [DISTWIDTH] IN BLOOD BY AUTOMATED COUNT: 13.1 %
ERYTHROCYTE [SEDIMENTATION RATE] IN BLOOD BY WESTERGREN METHOD: 20 MM/HR
EST. GFR  (AFRICAN AMERICAN): >60 ML/MIN/1.73 M^2
EST. GFR  (NON AFRICAN AMERICAN): >60 ML/MIN/1.73 M^2
GLUCOSE SERPL-MCNC: 58 MG/DL
GLUCOSE UR QL STRIP: NEGATIVE
HCT VFR BLD AUTO: 38.9 %
HGB BLD-MCNC: 13 G/DL
HGB UR QL STRIP: ABNORMAL
KETONES UR QL STRIP: NEGATIVE
LEUKOCYTE ESTERASE UR QL STRIP: NEGATIVE
LYMPHOCYTES # BLD AUTO: 2.7 K/UL
LYMPHOCYTES NFR BLD: 42.5 %
MCH RBC QN AUTO: 31.6 PG
MCHC RBC AUTO-ENTMCNC: 33.4 G/DL
MCV RBC AUTO: 95 FL
MICROSCOPIC COMMENT: NORMAL
MONOCYTES # BLD AUTO: 0.4 K/UL
MONOCYTES NFR BLD: 6.8 %
NEUTROPHILS # BLD AUTO: 3.2 K/UL
NEUTROPHILS NFR BLD: 49 %
NITRITE UR QL STRIP: NEGATIVE
PH UR STRIP: 7 [PH] (ref 5–8)
PLATELET # BLD AUTO: 222 K/UL
PMV BLD AUTO: 10.5 FL
POCT GLUCOSE: 116 MG/DL (ref 70–110)
POTASSIUM SERPL-SCNC: 3.4 MMOL/L
PROT SERPL-MCNC: 7.7 G/DL
PROT UR QL STRIP: NEGATIVE
RBC # BLD AUTO: 4.11 M/UL
RBC #/AREA URNS HPF: 0 /HPF (ref 0–4)
SODIUM SERPL-SCNC: 140 MMOL/L
SP GR UR STRIP: <=1.005 (ref 1–1.03)
SQUAMOUS #/AREA URNS HPF: 2 /HPF
TSH SERPL DL<=0.005 MIU/L-ACNC: 0.8 UIU/ML
URN SPEC COLLECT METH UR: ABNORMAL
UROBILINOGEN UR STRIP-ACNC: NEGATIVE EU/DL
WBC # BLD AUTO: 6.44 K/UL
WBC #/AREA URNS HPF: 2 /HPF (ref 0–5)

## 2019-01-02 PROCEDURE — 86140 C-REACTIVE PROTEIN: CPT

## 2019-01-02 PROCEDURE — 25000003 PHARM REV CODE 250: Performed by: EMERGENCY MEDICINE

## 2019-01-02 PROCEDURE — 93005 ELECTROCARDIOGRAM TRACING: CPT

## 2019-01-02 PROCEDURE — 93010 ELECTROCARDIOGRAM REPORT: CPT | Mod: ,,, | Performed by: INTERNAL MEDICINE

## 2019-01-02 PROCEDURE — 84443 ASSAY THYROID STIM HORMONE: CPT

## 2019-01-02 PROCEDURE — 80053 COMPREHEN METABOLIC PANEL: CPT

## 2019-01-02 PROCEDURE — 85025 COMPLETE CBC W/AUTO DIFF WBC: CPT

## 2019-01-02 PROCEDURE — 81000 URINALYSIS NONAUTO W/SCOPE: CPT

## 2019-01-02 PROCEDURE — 99285 EMERGENCY DEPT VISIT HI MDM: CPT | Mod: 25

## 2019-01-02 PROCEDURE — 82962 GLUCOSE BLOOD TEST: CPT

## 2019-01-02 PROCEDURE — 93010 EKG 12-LEAD: ICD-10-PCS | Mod: ,,, | Performed by: INTERNAL MEDICINE

## 2019-01-02 PROCEDURE — 36000 PLACE NEEDLE IN VEIN: CPT

## 2019-01-02 PROCEDURE — 85652 RBC SED RATE AUTOMATED: CPT

## 2019-01-02 RX ORDER — IBUPROFEN 800 MG/1
800 TABLET ORAL
Qty: 20 TABLET | Refills: 0 | Status: SHIPPED | OUTPATIENT
Start: 2019-01-02 | End: 2019-01-05

## 2019-01-02 RX ORDER — SODIUM CHLORIDE 9 MG/ML
500 INJECTION, SOLUTION INTRAVENOUS
Status: COMPLETED | OUTPATIENT
Start: 2019-01-02 | End: 2019-01-02

## 2019-01-02 RX ADMIN — SODIUM CHLORIDE 500 ML: 0.9 INJECTION, SOLUTION INTRAVENOUS at 09:01

## 2019-01-02 NOTE — ED NOTES
Pt presents to the ED w/ c/o of right sided headache that pt reports started last night. Pt reports that last night headache frequency was every 30mins. Pt reports that this morning at 0300am pt reports frequency of headache was every 5mins. Pt denies numbness/ tingling. Pt reports that when the headache became more frequent at 0300am she began to have palpitations and dizziness. Pt reports taking motrin at 0730 this morning. Pt denies chest pain or sob at this time. Pt denies n/v or blurry vision or photophobia. Pt is awake, alert, orientedx4.

## 2019-01-02 NOTE — ED NOTES
Pt's blood glucose is noted to be 58. Pt given orange juice and deyanira crackers. Pt is awake, alert, orientedx4. Pt sitting up and talking to family member.
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
## 2019-01-03 ENCOUNTER — HOSPITAL ENCOUNTER (OUTPATIENT)
Dept: RADIOLOGY | Facility: HOSPITAL | Age: 54
Discharge: HOME | End: 2019-01-03
Attending: FAMILY MEDICINE
Payer: COMMERCIAL

## 2019-01-03 ENCOUNTER — OFFICE VISIT (OUTPATIENT)
Dept: FAMILY MEDICINE | Facility: CLINIC | Age: 54
End: 2019-01-03
Attending: FAMILY MEDICINE
Payer: COMMERCIAL

## 2019-01-03 ENCOUNTER — TELEPHONE (OUTPATIENT)
Dept: FAMILY MEDICINE | Facility: CLINIC | Age: 54
End: 2019-01-03

## 2019-01-03 VITALS
HEIGHT: 62 IN | SYSTOLIC BLOOD PRESSURE: 116 MMHG | OXYGEN SATURATION: 97 % | BODY MASS INDEX: 22.55 KG/M2 | DIASTOLIC BLOOD PRESSURE: 74 MMHG | HEART RATE: 76 BPM | WEIGHT: 122.56 LBS

## 2019-01-03 DIAGNOSIS — R51.9 ACUTE INTRACTABLE HEADACHE, UNSPECIFIED HEADACHE TYPE: Primary | ICD-10-CM

## 2019-01-03 DIAGNOSIS — R51.9 ACUTE INTRACTABLE HEADACHE, UNSPECIFIED HEADACHE TYPE: ICD-10-CM

## 2019-01-03 DIAGNOSIS — C18.7 MALIGNANT NEOPLASM OF SIGMOID COLON: ICD-10-CM

## 2019-01-03 DIAGNOSIS — G25.81 RESTLESS LEG SYNDROME: ICD-10-CM

## 2019-01-03 PROCEDURE — 99999 PR PBB SHADOW E&M-EST. PATIENT-LVL III: ICD-10-PCS | Mod: PBBFAC,,, | Performed by: FAMILY MEDICINE

## 2019-01-03 PROCEDURE — 99214 OFFICE O/P EST MOD 30 MIN: CPT | Mod: S$GLB,,, | Performed by: FAMILY MEDICINE

## 2019-01-03 PROCEDURE — 70450 CT HEAD/BRAIN W/O DYE: CPT | Mod: 26,,, | Performed by: RADIOLOGY

## 2019-01-03 PROCEDURE — 3008F BODY MASS INDEX DOCD: CPT | Mod: CPTII,S$GLB,, | Performed by: FAMILY MEDICINE

## 2019-01-03 PROCEDURE — 70450 CT HEAD/BRAIN W/O DYE: CPT | Mod: TC

## 2019-01-03 PROCEDURE — 99999 PR PBB SHADOW E&M-EST. PATIENT-LVL III: CPT | Mod: PBBFAC,,, | Performed by: FAMILY MEDICINE

## 2019-01-03 PROCEDURE — 99214 PR OFFICE/OUTPT VISIT, EST, LEVL IV, 30-39 MIN: ICD-10-PCS | Mod: S$GLB,,, | Performed by: FAMILY MEDICINE

## 2019-01-03 PROCEDURE — 70450 CT HEAD WITHOUT CONTRAST: ICD-10-PCS | Mod: 26,,, | Performed by: RADIOLOGY

## 2019-01-03 PROCEDURE — 3008F PR BODY MASS INDEX (BMI) DOCUMENTED: ICD-10-PCS | Mod: CPTII,S$GLB,, | Performed by: FAMILY MEDICINE

## 2019-01-03 RX ORDER — BUTALBITAL, ACETAMINOPHEN AND CAFFEINE 50; 325; 40 MG/1; MG/1; MG/1
1 TABLET ORAL EVERY 4 HOURS PRN
Qty: 10 TABLET | Refills: 0 | OUTPATIENT
Start: 2019-01-03 | End: 2019-02-02

## 2019-01-03 NOTE — TELEPHONE ENCOUNTER
Spoke to pharmacist at [facility] and verbally called in Fioricet. Left msg for CB to inform pt her medication was verbally called in.

## 2019-01-03 NOTE — TELEPHONE ENCOUNTER
----- Message from Verna Zavala sent at 1/3/2019  1:34 PM CST -----  Contact: self, [phone]  Patient states [facility] pharmacy has not received her Fioricet prescription order. Please advise.

## 2019-01-03 NOTE — PROGRESS NOTES
Subjective:       Patient ID: Carmen Gray is a 53 y.o. female.    Chief Complaint: Dizziness; Hypertension; Fatigue; and Headache    53 yr old pleasant white female with restless legs syndrome, insomnia, colon cA now in remission, presents today for evaluation of severe right sided headache. Onset a week ago and rapidly worsening. She already went to ER yesterday and never had any imaging of her head. She denies any weakness/tingling/numbness/weakness. She also denies any blurred vision but she complaints of dizziness and fatigue. It is her worst headache of life. Details as follows -      History as below - reviewed       Headache    This is a new problem. The current episode started in the past 7 days. The problem occurs constantly. The problem has been rapidly worsening. The pain is located in the right unilateral region. The pain does not radiate. The quality of the pain is described as aching, pulsating, shooting, stabbing and throbbing. The pain is at a severity of 10/10. The pain is severe. Pertinent negatives include no abdominal pain, back pain, coughing, dizziness, drainage, eye pain, eye watering, facial sweating, hearing loss, muscle aches, nausea, neck pain, rhinorrhea, scalp tenderness, seizures, sinus pressure, sore throat, tingling, vomiting or weight loss. Nothing aggravates the symptoms. She has tried acetaminophen and NSAIDs for the symptoms. The treatment provided no relief.     Review of Systems   Constitutional: Positive for fatigue. Negative for activity change, diaphoresis, unexpected weight change and weight loss.   HENT: Negative.  Negative for congestion, ear discharge, hearing loss, rhinorrhea, sinus pressure, sore throat and voice change.    Eyes: Negative.  Negative for pain, discharge and visual disturbance.   Respiratory: Negative.  Negative for cough, chest tightness, shortness of breath and wheezing.    Cardiovascular: Negative.  Negative for chest pain.   Gastrointestinal:  Negative.  Negative for abdominal distention, abdominal pain, anal bleeding, constipation, nausea and vomiting.   Endocrine: Negative.  Negative for cold intolerance, polydipsia and polyuria.   Genitourinary: Negative.  Negative for decreased urine volume, difficulty urinating, dysuria, frequency, menstrual problem and vaginal pain.   Musculoskeletal: Negative.  Negative for arthralgias, back pain, gait problem, myalgias and neck pain.   Skin: Negative.  Negative for color change, pallor and wound.   Allergic/Immunologic: Negative.  Negative for environmental allergies and immunocompromised state.   Neurological: Positive for headaches. Negative for dizziness, tingling, tremors, seizures and speech difficulty.   Hematological: Negative.  Negative for adenopathy. Does not bruise/bleed easily.   Psychiatric/Behavioral: Negative.  Negative for agitation, confusion, decreased concentration, hallucinations, self-injury and suicidal ideas. The patient is not nervous/anxious.        PMH/PSH/FH/SH/MED/ALLERGY reviewed    Objective:       Vitals:    01/03/19 1104   BP: 116/74   Pulse: 76       Physical Exam   Constitutional: She is oriented to person, place, and time. She appears well-developed and well-nourished. No distress.   HENT:   Head: Normocephalic and atraumatic.   Right Ear: External ear normal.   Left Ear: External ear normal.   Nose: Nose normal.   Mouth/Throat: Oropharynx is clear and moist. No oropharyngeal exudate.   Eyes: Conjunctivae and EOM are normal. Pupils are equal, round, and reactive to light. Right eye exhibits no discharge. Left eye exhibits no discharge. No scleral icterus.   Neck: Normal range of motion. Neck supple. No JVD present. No tracheal deviation present. No thyromegaly present.   Cardiovascular: Normal rate, regular rhythm, normal heart sounds and intact distal pulses. Exam reveals no gallop and no friction rub.   No murmur heard.  Pulmonary/Chest: Effort normal and breath sounds normal.  No stridor. She has no wheezes. She has no rales. She exhibits no tenderness.   Abdominal: Soft. Bowel sounds are normal. She exhibits no distension and no mass. There is no tenderness. There is no rebound and no guarding. No hernia.   Musculoskeletal: Normal range of motion. She exhibits no edema or tenderness.   Lymphadenopathy:     She has no cervical adenopathy.   Neurological: She is alert and oriented to person, place, and time. She has normal reflexes. She displays normal reflexes. No cranial nerve deficit. She exhibits normal muscle tone. Coordination normal.   Skin: Skin is warm and dry. No rash noted. She is not diaphoretic. No erythema. No pallor.   Psychiatric: She has a normal mood and affect. Her behavior is normal. Judgment and thought content normal.       Assessment:       1. Acute intractable headache, unspecified headache type    2. Restless leg syndrome    3. Malignant neoplasm of sigmoid colon        Plan:       Carmen was seen today for dizziness, hypertension, fatigue and headache.    Diagnoses and all orders for this visit:    Acute intractable headache, unspecified headache type  -     CT Head Without Contrast; Future  -     butalbital-acetaminophen-caffeine -40 mg (FIORICET, ESGIC) -40 mg per tablet; Take 1 tablet by mouth every 4 (four) hours as needed for Pain.    Restless leg syndrome    Malignant neoplasm of sigmoid colon      Acute headache - right sided  -DD tension, migraine or any mass  -h/o colon CA - r/o METS  -CT head stat  -ER precautions given  -fioricet prn    Spent adequate time in obtaining history and explaining differentials    40 minutes spent during this visit of which greater than 50% devoted to face-face counseling and coordination of care regarding diagnosis and management plan    Follow-up in about 4 weeks (around 1/31/2019), or if symptoms worsen or fail to improve.

## 2019-02-01 DIAGNOSIS — Z12.39 BREAST CANCER SCREENING: ICD-10-CM

## 2019-04-09 ENCOUNTER — TELEPHONE (OUTPATIENT)
Dept: ENDOSCOPY | Facility: HOSPITAL | Age: 54
End: 2019-04-09

## 2019-04-09 ENCOUNTER — TELEPHONE (OUTPATIENT)
Dept: SURGERY | Facility: CLINIC | Age: 54
End: 2019-04-09

## 2019-04-09 DIAGNOSIS — Z12.11 SPECIAL SCREENING FOR MALIGNANT NEOPLASMS, COLON: Primary | ICD-10-CM

## 2019-04-09 DIAGNOSIS — Z85.038 PERSONAL HISTORY OF COLON CANCER: Primary | ICD-10-CM

## 2019-04-09 RX ORDER — POLYETHYLENE GLYCOL 3350, SODIUM SULFATE ANHYDROUS, SODIUM BICARBONATE, SODIUM CHLORIDE, POTASSIUM CHLORIDE 236; 22.74; 6.74; 5.86; 2.97 G/4L; G/4L; G/4L; G/4L; G/4L
4 POWDER, FOR SOLUTION ORAL ONCE
Qty: 4000 ML | Refills: 0 | Status: SHIPPED | OUTPATIENT
Start: 2019-04-09 | End: 2019-04-09

## 2019-05-07 DIAGNOSIS — G25.81 RESTLESS LEG SYNDROME: ICD-10-CM

## 2019-05-07 RX ORDER — GABAPENTIN 300 MG/1
CAPSULE ORAL
Qty: 90 CAPSULE | Refills: 0 | Status: SHIPPED | OUTPATIENT
Start: 2019-05-07 | End: 2019-08-11 | Stop reason: SDUPTHER

## 2019-05-20 ENCOUNTER — ANESTHESIA EVENT (OUTPATIENT)
Dept: ENDOSCOPY | Facility: HOSPITAL | Age: 54
End: 2019-05-20
Payer: COMMERCIAL

## 2019-05-20 ENCOUNTER — HOSPITAL ENCOUNTER (OUTPATIENT)
Facility: HOSPITAL | Age: 54
Discharge: HOME | End: 2019-05-20
Attending: COLON & RECTAL SURGERY | Admitting: COLON & RECTAL SURGERY
Payer: COMMERCIAL

## 2019-05-20 ENCOUNTER — ANESTHESIA (OUTPATIENT)
Dept: ENDOSCOPY | Facility: HOSPITAL | Age: 54
End: 2019-05-20
Payer: COMMERCIAL

## 2019-05-20 VITALS
HEART RATE: 50 BPM | SYSTOLIC BLOOD PRESSURE: 124 MMHG | WEIGHT: 118 LBS | DIASTOLIC BLOOD PRESSURE: 73 MMHG | TEMPERATURE: 98 F | HEIGHT: 62 IN | OXYGEN SATURATION: 99 % | BODY MASS INDEX: 21.71 KG/M2 | RESPIRATION RATE: 18 BRPM

## 2019-05-20 DIAGNOSIS — Z12.11 SCREENING FOR COLON CANCER: Primary | ICD-10-CM

## 2019-05-20 PROCEDURE — E9220 PRA ENDO ANESTHESIA: HCPCS | Mod: ,,, | Performed by: NURSE ANESTHETIST, CERTIFIED REGISTERED

## 2019-05-20 PROCEDURE — E9220 PRA ENDO ANESTHESIA: ICD-10-PCS | Mod: ,,, | Performed by: NURSE ANESTHETIST, CERTIFIED REGISTERED

## 2019-05-20 PROCEDURE — G0105 COLORECTAL SCRN; HI RISK IND: HCPCS | Performed by: COLON & RECTAL SURGERY

## 2019-05-20 PROCEDURE — 25000003 PHARM REV CODE 250: Performed by: NURSE PRACTITIONER

## 2019-05-20 PROCEDURE — 63600175 PHARM REV CODE 636 W HCPCS: Performed by: NURSE ANESTHETIST, CERTIFIED REGISTERED

## 2019-05-20 PROCEDURE — 37000008 HC ANESTHESIA 1ST 15 MINUTES: Performed by: COLON & RECTAL SURGERY

## 2019-05-20 PROCEDURE — G0105 COLORECTAL SCRN; HI RISK IND: HCPCS | Mod: ,,, | Performed by: COLON & RECTAL SURGERY

## 2019-05-20 PROCEDURE — 37000009 HC ANESTHESIA EA ADD 15 MINS: Performed by: COLON & RECTAL SURGERY

## 2019-05-20 PROCEDURE — G0105 COLORECTAL SCRN; HI RISK IND: ICD-10-PCS | Mod: ,,, | Performed by: COLON & RECTAL SURGERY

## 2019-05-20 RX ORDER — PROPOFOL 10 MG/ML
VIAL (ML) INTRAVENOUS CONTINUOUS PRN
Status: DISCONTINUED | OUTPATIENT
Start: 2019-05-20 | End: 2019-05-20

## 2019-05-20 RX ORDER — SODIUM CHLORIDE 0.9 % (FLUSH) 0.9 %
10 SYRINGE (ML) INJECTION
Status: DISCONTINUED | OUTPATIENT
Start: 2019-05-20 | End: 2019-05-20 | Stop reason: HOSPADM

## 2019-05-20 RX ORDER — LIDOCAINE HCL/PF 100 MG/5ML
SYRINGE (ML) INTRAVENOUS
Status: DISCONTINUED | OUTPATIENT
Start: 2019-05-20 | End: 2019-05-20

## 2019-05-20 RX ORDER — PROPOFOL 10 MG/ML
VIAL (ML) INTRAVENOUS
Status: DISCONTINUED | OUTPATIENT
Start: 2019-05-20 | End: 2019-05-20

## 2019-05-20 RX ORDER — SODIUM CHLORIDE 9 MG/ML
INJECTION, SOLUTION INTRAVENOUS CONTINUOUS
Status: DISCONTINUED | OUTPATIENT
Start: 2019-05-20 | End: 2019-05-20 | Stop reason: HOSPADM

## 2019-05-20 RX ADMIN — LIDOCAINE HYDROCHLORIDE 30 MG: 20 INJECTION, SOLUTION INTRAVENOUS at 10:05

## 2019-05-20 RX ADMIN — SODIUM CHLORIDE: 0.9 INJECTION, SOLUTION INTRAVENOUS at 10:05

## 2019-05-20 RX ADMIN — PROPOFOL 50 MG: 10 INJECTION, EMULSION INTRAVENOUS at 10:05

## 2019-05-20 RX ADMIN — PROPOFOL 200 MCG/KG/MIN: 10 INJECTION, EMULSION INTRAVENOUS at 10:05

## 2019-05-20 RX ADMIN — PROPOFOL 10 MG: 10 INJECTION, EMULSION INTRAVENOUS at 10:05

## 2019-05-20 NOTE — ANESTHESIA PREPROCEDURE EVALUATION
05/20/2019  Carmen Gray is a 53 y.o., female.  Past Medical History:   Diagnosis Date    Anxiety     Depression     Insomnia     Restless leg syndrome        Past Surgical History:   Procedure Laterality Date    CHOLECYSTECTOMY      COLONOSCOPY N/A 2/8/2018    [facility]formed [facility] at [facility] ENDO (4TH FLR)    COLONOSCOPY  split N/A 2/7/2017    [facility]O    gastric sleeve  2015    HERNIA REPAIR      hiatal     SIGMOIDECTOMY-LAPAROSCOPIC N/A 3/28/2017    Performed by Polo [facility] at [facility] OR 2ND FLR    TUBAL LIGATION         Family History   Problem Relation Age of Onset    Hypertension Mother     Hypertension Father     Hypertension Brother     Thyroid disease Unknown     Anesthesia problems Neg Hx        Social History     Socioeconomic History    Marital status:      Spouse name: Not on file    Number of children: Not on file    Years of education: Not on file    Highest education level: Not on file   Occupational History    Not on file   Social Needs    Financial resource strain: Not on file    Food insecurity:     Worry: Not on file     Inability: Not on file    Transportation needs:     Medical: Not on file     Non-medical: Not on file   Tobacco Use    Smoking status: Current Every Day Smoker     Packs/day: 0.50     Years: 30.00     Pack years: 15.00     Types: Cigarettes    Smokeless tobacco: Never Used   Substance and Sexual Activity    Alcohol use: Yes     Comment: social (history of heavy drinking around the time of her divorce)    Drug use: No    Sexual activity: Not on file   Lifestyle    Physical activity:     Days per week: Not on file     Minutes per session: Not on file    Stress: Not on file   Relationships    Social connections:     Talks on phone: Not on file     Gets together: Not on  file     Attends Confucianist service: Not on file     Active member of club or organization: Not on file     Attends meetings of clubs or organizations: Not on file     Relationship status: Not on file   Other Topics Concern    Not on file   Social History Narrative    Not on file       No current facility-administered medications for this encounter.      Current Outpatient Medications   Medication Sig Dispense Refill    gabapentin (NEURONTIN) 300 MG capsule TAKE 1 CAPSULE(300 MG) BY MOUTH EVERY EVENING 90 capsule 0    LORazepam (ATIVAN) 0.5 MG tablet Take 1 tablet (0.5 mg total) by mouth nightly as needed for Anxiety. 30 tablet 0    RESTASIS 0.05 % ophthalmic emulsion Place 1 drop into both eyes every evening.       varenicline (CHANTIX STARTING MONTH BOX) 0.5 mg (11)- 1 mg (42) tablet Take one 0.5mg tab by mouth once daily X3 days,then increase to one 0.5mg tab twice daily X4 days,then increase to one 1mg tab twice daily 1 Package 0       Review of patient's allergies indicates:   Allergen Reactions    Codeine Itching    Hydrocodone Itching         Anesthesia Evaluation    I have reviewed the Patient Summary Reports.    I have reviewed the Nursing Notes.   I have reviewed the Medications.     Review of Systems  Anesthesia Hx:  No problems with previous Anesthesia  History of prior surgery of interest to airway management or planning: Previous anesthesia: General Denies Family Hx of Anesthesia complications.   Denies Personal Hx of Anesthesia complications.   Social:  Smoker, Alcohol Use    Hematology/Oncology:  Hematology Normal   Oncology Normal     EENT/Dental:EENT/Dental Normal   Cardiovascular:  Cardiovascular Normal Exercise tolerance: good     Pulmonary:  Pulmonary Normal    Renal/:  Renal/ Normal     Hepatic/GI:  Hepatic/GI Normal Bowel Prep.    Musculoskeletal:  Musculoskeletal Normal    Neurological:  Neurology Normal   Restless leg syndrome   Endocrine:  Endocrine Normal     Dermatological:  Skin Normal    Psych:   anxiety depression          Physical Exam  General:  Well nourished    Airway/Jaw/Neck:  Airway Findings: Mouth Opening: Normal Tongue: Normal  General Airway Assessment: Adult  Mallampati: II  Improves to II with phonation.  TM Distance: 4 - 6 cm        Eyes/Ears/Nose:  EYES/EARS/NOSE FINDINGS: Normal   Dental:  DENTAL FINDINGS: Normal   Chest/Lungs:  Chest/Lungs Clear    Heart/Vascular:  Heart Findings: Normal Heart murmur: negative Vascular Findings: Normal    Abdomen:  Abdomen Findings: Normal    Musculoskeletal:  Musculoskeletal Findings: Normal   Skin:  Skin Findings: Normal    Mental Status:  Mental Status Findings: Normal        Anesthesia Plan  Type of Anesthesia, risks & benefits discussed:  Anesthesia Type:  general  Patient's Preference: General  Intra-op Monitoring Plan: standard ASA monitors  Intra-op Monitoring Plan Comments:   Post Op Pain Control Plan: multimodal analgesia  Post Op Pain Control Plan Comments:   Induction:   IV  Beta Blocker:  Patient is not currently on a Beta-Blocker (No further documentation required).       Informed Consent: Patient understands risks and agrees with Anesthesia plan.  Questions answered. Anesthesia consent signed with patient.  ASA Score: 2     Day of Surgery Review of History & Physical: I have interviewed and examined the patient. I have reviewed the patient's H&P dated:  There are no significant changes.  H&P update referred to the provider.         Ready For Surgery From Anesthesia Perspective.

## 2019-05-20 NOTE — TRANSFER OF CARE
"Anesthesia Transfer of Care Note    Patient: Carmen Gray    Procedure(s) Performed: Procedure(s) (LRB):  COLONOSCOPY (N/A)    Patient location: GI    Anesthesia Type: general    Transport from OR: Transported from OR on room air with adequate spontaneous ventilation    Post pain: adequate analgesia    Post assessment: no apparent anesthetic complications and tolerated procedure well    Post vital signs: stable    Level of consciousness: lethargic and responds to stimulation    Nausea/Vomiting: no nausea/vomiting    Complications: none    Transfer of care protocol was followed      Last vitals:   Visit Vitals  /71   Pulse 64   Temp 36.7 °C (98.1 °F)   Resp 16   Ht 5' 2" (1.575 m)   Wt 53.5 kg (118 lb)   SpO2 100%   Breastfeeding? No   BMI 21.58 kg/m²     "

## 2019-05-20 NOTE — ANESTHESIA POSTPROCEDURE EVALUATION
Anesthesia Post Evaluation    Patient: Carmen Gray    Procedure(s) Performed: Procedure(s) (LRB):  COLONOSCOPY (N/A)    Final Anesthesia Type: general  Patient location during evaluation: GI PACU  Patient participation: Yes- Able to Participate  Level of consciousness: awake and alert  Post-procedure vital signs: reviewed and stable  Pain management: adequate  Airway patency: patent  PONV status at discharge: No PONV  Anesthetic complications: no      Cardiovascular status: blood pressure returned to baseline  Respiratory status: unassisted  Hydration status: euvolemic  Follow-up not needed.          Vitals Value Taken Time   /71 5/20/2019 11:14 AM   Temp 36.7 °C (98.1 °F) 5/20/2019 11:02 AM   Pulse 62 5/20/2019 11:14 AM   Resp 16 5/20/2019 11:14 AM   SpO2 100 % 5/20/2019 11:14 AM         No case tracking events are documented in the log.      Pain/Rafal Score: No data recorded

## 2019-05-20 NOTE — [employer] PATIENT INSTRUCTIONS
Discharge Summary/Instructions after an Endoscopic Procedure  Patient Name: Carmen Gray  Patient MRN: [id number]  Patient YOB: 1965  Monday, May 20, 2019  Polo Abel MD  RESTRICTIONS:  During your procedure today, you received medications for sedation.  These   medications may affect your judgment, balance and coordination.  Therefore,   for 24 hours, you have the following restrictions:   - DO NOT drive a car, operate machinery, make legal/financial decisions,   sign important papers or drink alcohol.    ACTIVITY:  Today: no heavy lifting, straining or running due to procedural   sedation/anesthesia.  The following day: return to full activity including work.  DIET:  Eat and drink normally unless instructed otherwise.     TREATMENT FOR COMMON SIDE EFFECTS:  - Mild abdominal pain, nausea, belching, bloating or excessive gas:  rest,   eat lightly and use a heating pad.  - Sore Throat: treat with throat lozenges and/or gargle with warm salt   water.  - Because air was used during the procedure, expelling large amounts of air   from your rectum or belching is normal.  - If a bowel prep was taken, you may not have a bowel movement for 1-3 days.    This is normal.  SYMPTOMS TO WATCH FOR AND REPORT TO YOUR PHYSICIAN:  1. Abdominal pain or bloating, other than gas cramps.  2. Chest pain.  3. Back pain.  4. Signs of infection such as: chills or fever occurring within 24 hours   after the procedure.  5. Rectal bleeding, which would show as bright red, maroon, or black stools.   (A tablespoon of blood from the rectum is not serious, especially if   hemorrhoids are present.)  6. Vomiting.  7. Weakness or dizziness.  GO DIRECTLY TO THE NEAREST EMERGENCY ROOM IF YOU HAVE ANY OF THE FOLLOWING:      Difficulty breathing              Chills and/or fever over 101 F   Persistent vomiting and/or vomiting blood   Severe abdominal pain   Severe chest pain   Black, tarry stools   Bleeding- more than one tablespoon   Any  other symptom or condition that you feel may need urgent attention  Your doctor recommends these additional instructions:  If any biopsies were taken, your doctors clinic will contact you in 1 to 2   weeks with any results.  - Patient has a contact number available for emergencies.  The signs and   symptoms of potential delayed complications were discussed with the   patient.  Return to normal activities tomorrow.  Written discharge   instructions were provided to the patient.   - Discharge patient to home (ambulatory).   - Resume regular diet indefinitely.   - Repeat colonoscopy in 3 years for surveillance.   - Continue present medications.  For questions, problems or results please call your physician - Polo Abel MD at Work:  [phone].  [facility], EMERGENCY ROOM PHONE NUMBER: [phone]  IF A COMPLICATION OR EMERGENCY SITUATION ARISES AND YOU ARE UNABLE TO REACH   YOUR PHYSICIAN - GO DIRECTLY TO THE EMERGENCY ROOM.  Polo Abel MD  5/20/2019 11:01:38 AM  This report has been verified and signed electronically.  [employer]

## 2019-05-20 NOTE — H&P
COLONOSCOPY HISTORY AND PE    Procedure : Colonoscopy      INDICATIONS: personal history of colon cancer (2017, T1)    Family Hx of CRC: none    Last Colonoscopy:  2018  Findings: adenomas        Past Medical History:   Diagnosis Date    Anxiety     Depression     Insomnia     Restless leg syndrome      Sedation Problems: NO  Family History   Problem Relation Age of Onset    Hypertension Mother     Hypertension Father     Hypertension Brother     Thyroid disease Unknown     Anesthesia problems Neg Hx      Fam Hx of Sedation Problems: NO  Social History     Socioeconomic History    Marital status:      Spouse name: Not on file    Number of children: Not on file    Years of education: Not on file    Highest education level: Not on file   Occupational History    Not on file   Social Needs    Financial resource strain: Not on file    Food insecurity:     Worry: Not on file     Inability: Not on file    Transportation needs:     Medical: Not on file     Non-medical: Not on file   Tobacco Use    Smoking status: Current Every Day Smoker     Packs/day: 0.50     Years: 30.00     Pack years: 15.00     Types: Cigarettes    Smokeless tobacco: Never Used   Substance and Sexual Activity    Alcohol use: Yes     Comment: social (history of heavy drinking around the time of her divorce)    Drug use: No    Sexual activity: Not on file   Lifestyle    Physical activity:     Days per week: Not on file     Minutes per session: Not on file    Stress: Not on file   Relationships    Social connections:     Talks on phone: Not on file     Gets together: Not on file     Attends Shinto service: Not on file     Active member of club or organization: Not on file     Attends meetings of clubs or organizations: Not on file     Relationship status: Not on file   Other Topics Concern    Not on file   Social History Narrative    Not on file       Review of Systems - Negative except   Respiratory ROS: no  dyspnea  Cardiovascular ROS: no exertional chest pain  Gastrointestinal ROS: NO abdominal discomfort,  NO rectal bleeding  Musculoskeletal ROS: no muscular pain  Neurological ROS: no recent stroke    Physical Exam:  There were no vitals taken for this visit.  General: no distress  Head: normocephalic  Mallampati Score   Neck: supple, symmetrical, trachea midline  Lungs:  normal respiratory effort  Heart: regular rate and rhythm  Abdomen: soft, non-tender non-distented; bowel sounds normal; no masses,  no organomegaly  Extremities: no cyanosis or edema, or clubbing    ASA:  II    PLAN  COLONOSCOPY.    SedationPlan :MAC    The details of the procedure, the possible need for biopsy or polypectomy and the potential risks including bleeding, perforation, missed polyps were discussed in detail.

## 2019-05-27 ENCOUNTER — TELEPHONE (OUTPATIENT)
Dept: ENDOSCOPY | Facility: HOSPITAL | Age: 54
End: 2019-05-27

## 2019-06-11 ENCOUNTER — PATIENT OUTREACH (OUTPATIENT)
Dept: ADMINISTRATIVE | Facility: HOSPITAL | Age: 54
End: 2019-06-11

## 2019-08-11 DIAGNOSIS — G25.81 RESTLESS LEG SYNDROME: ICD-10-CM

## 2019-08-12 RX ORDER — GABAPENTIN 300 MG/1
CAPSULE ORAL
Qty: 90 CAPSULE | Refills: 0 | Status: SHIPPED | OUTPATIENT
Start: 2019-08-12 | End: 2019-11-26 | Stop reason: SDUPTHER

## 2019-09-04 ENCOUNTER — PATIENT MESSAGE (OUTPATIENT)
Dept: ADMINISTRATIVE | Facility: HOSPITAL | Age: 54
End: 2019-09-04

## 2019-09-17 ENCOUNTER — PATIENT MESSAGE (OUTPATIENT)
Dept: ADMINISTRATIVE | Facility: HOSPITAL | Age: 54
End: 2019-09-17

## 2019-09-17 ENCOUNTER — PATIENT OUTREACH (OUTPATIENT)
Dept: ADMINISTRATIVE | Facility: HOSPITAL | Age: 54
End: 2019-09-17

## 2019-11-26 DIAGNOSIS — G25.81 RESTLESS LEG SYNDROME: ICD-10-CM

## 2019-11-26 RX ORDER — GABAPENTIN 300 MG/1
CAPSULE ORAL
Qty: 90 CAPSULE | Refills: 0 | Status: SHIPPED | OUTPATIENT
Start: 2019-11-26 | End: 2020-03-16

## 2020-01-27 ENCOUNTER — PATIENT OUTREACH (OUTPATIENT)
Dept: ADMINISTRATIVE | Facility: HOSPITAL | Age: 55
End: 2020-01-27

## 2020-01-27 ENCOUNTER — PATIENT MESSAGE (OUTPATIENT)
Dept: ADMINISTRATIVE | Facility: HOSPITAL | Age: 55
End: 2020-01-27

## 2020-03-16 DIAGNOSIS — G25.81 RESTLESS LEG SYNDROME: ICD-10-CM

## 2020-03-16 RX ORDER — GABAPENTIN 300 MG/1
CAPSULE ORAL
Qty: 90 CAPSULE | Refills: 0 | Status: SHIPPED | OUTPATIENT
Start: 2020-03-16 | End: 2020-06-11

## 2020-04-20 ENCOUNTER — PATIENT OUTREACH (OUTPATIENT)
Dept: ADMINISTRATIVE | Facility: HOSPITAL | Age: 55
End: 2020-04-20

## 2020-04-21 ENCOUNTER — PATIENT OUTREACH (OUTPATIENT)
Dept: ADMINISTRATIVE | Facility: HOSPITAL | Age: 55
End: 2020-04-21

## 2020-05-20 ENCOUNTER — PATIENT OUTREACH (OUTPATIENT)
Dept: ADMINISTRATIVE | Facility: HOSPITAL | Age: 55
End: 2020-05-20

## 2020-05-20 DIAGNOSIS — Z12.31 ENCOUNTER FOR SCREENING MAMMOGRAM FOR MALIGNANT NEOPLASM OF BREAST: Primary | ICD-10-CM

## 2020-07-28 ENCOUNTER — OFFICE VISIT (OUTPATIENT)
Dept: URGENT CARE | Facility: CLINIC | Age: 55
End: 2020-07-28
Payer: COMMERCIAL

## 2020-07-28 VITALS
OXYGEN SATURATION: 97 % | RESPIRATION RATE: 16 BRPM | SYSTOLIC BLOOD PRESSURE: 105 MMHG | TEMPERATURE: 97 F | HEART RATE: 71 BPM | BODY MASS INDEX: 22.15 KG/M2 | DIASTOLIC BLOOD PRESSURE: 78 MMHG | HEIGHT: 63 IN | WEIGHT: 125 LBS

## 2020-07-28 DIAGNOSIS — R53.83 FATIGUE, UNSPECIFIED TYPE: ICD-10-CM

## 2020-07-28 DIAGNOSIS — R51.9 NONINTRACTABLE HEADACHE, UNSPECIFIED CHRONICITY PATTERN, UNSPECIFIED HEADACHE TYPE: ICD-10-CM

## 2020-07-28 DIAGNOSIS — J02.9 PHARYNGITIS, UNSPECIFIED ETIOLOGY: Primary | ICD-10-CM

## 2020-07-28 PROCEDURE — U0003 INFECTIOUS AGENT DETECTION BY NUCLEIC ACID (DNA OR RNA); SEVERE ACUTE RESPIRATORY SYNDROME CORONAVIRUS 2 (SARS-COV-2) (CORONAVIRUS DISEASE [COVID-19]), AMPLIFIED PROBE TECHNIQUE, MAKING USE OF HIGH THROUGHPUT TECHNOLOGIES AS DESCRIBED BY CMS-2020-01-R: HCPCS

## 2020-07-28 PROCEDURE — 99214 PR OFFICE/OUTPT VISIT, EST, LEVL IV, 30-39 MIN: ICD-10-PCS | Mod: S$GLB,,, | Performed by: PHYSICIAN ASSISTANT

## 2020-07-28 PROCEDURE — 99214 OFFICE O/P EST MOD 30 MIN: CPT | Mod: S$GLB,,, | Performed by: PHYSICIAN ASSISTANT

## 2020-07-28 RX ORDER — BUTALBITAL, ACETAMINOPHEN AND CAFFEINE 50; 325; 40 MG/1; MG/1; MG/1
1 TABLET ORAL EVERY 6 HOURS PRN
Qty: 12 TABLET | Refills: 0 | Status: SHIPPED | OUTPATIENT
Start: 2020-07-28 | End: 2020-07-30

## 2020-07-28 NOTE — PROGRESS NOTES
"Subjective:       Patient ID: Carmen Gray is a 54 y.o. female.    Vitals:  height is 5' 2.5" (1.588 m) and weight is 56.7 kg (125 lb). Her tympanic temperature is 97.1 °F (36.2 °C). Her blood pressure is 105/78 and her pulse is 71. Her respiration is 16 and oxygen saturation is 97%.     Chief Complaint: COVID-19 Concerns, Headache, and Sore Throat    Headache   This is a new problem. Episode onset: 3 days. The problem occurs constantly (worse at night). The problem has been unchanged. The pain is located in the bilateral, frontal, temporal and vertex region. The pain does not radiate. The pain quality is not similar to prior headaches. The pain is at a severity of 5/10. The pain is moderate. Associated symptoms include a sore throat. Pertinent negatives include no blurred vision, coughing, dizziness, fever, nausea, vomiting or weakness. Nothing aggravates the symptoms. She has tried acetaminophen for the symptoms. The treatment provided no relief.   Sore Throat   This is a new problem. Episode onset: 4-5 days. The problem has been unchanged. Neither side of throat is experiencing more pain than the other. There has been no fever. The pain is at a severity of 4/10. The pain is mild. Associated symptoms include headaches. Pertinent negatives include no congestion, coughing, diarrhea, shortness of breath or vomiting. She has tried acetaminophen for the symptoms. The treatment provided no relief.       Constitution: Positive for fatigue. Negative for chills and fever.   HENT: Positive for sore throat. Negative for congestion.    Neck: Negative for painful lymph nodes.   Cardiovascular: Negative for chest pain and leg swelling.   Eyes: Negative for double vision and blurred vision.   Respiratory: Negative for cough and shortness of breath.    Gastrointestinal: Negative for nausea, vomiting and diarrhea.   Genitourinary: Negative for dysuria, frequency, urgency and history of kidney stones.   Musculoskeletal: Negative " for joint pain, joint swelling, muscle cramps and muscle ache.   Skin: Negative for color change, pale, rash and bruising.   Allergic/Immunologic: Negative for seasonal allergies.   Neurological: Positive for headaches. Negative for dizziness, history of vertigo, light-headedness and passing out.   Hematologic/Lymphatic: Negative for swollen lymph nodes.   Psychiatric/Behavioral: Negative for nervous/anxious, sleep disturbance and depression. The patient is not nervous/anxious.        Objective:      Physical Exam   Constitutional: She is oriented to person, place, and time. She appears well-developed. She is cooperative.  Non-toxic appearance. She does not appear ill. No distress.   HENT:   Head: Normocephalic and atraumatic.   Ears:   Right Ear: Hearing, tympanic membrane, external ear and ear canal normal.   Left Ear: Hearing, tympanic membrane, external ear and ear canal normal.   Nose: Nose normal. No mucosal edema, rhinorrhea or nasal deformity. No epistaxis. Right sinus exhibits no maxillary sinus tenderness and no frontal sinus tenderness. Left sinus exhibits no maxillary sinus tenderness and no frontal sinus tenderness.   Mouth/Throat: Uvula is midline, oropharynx is clear and moist and mucous membranes are normal. No trismus in the jaw. Normal dentition. No uvula swelling. No oropharyngeal exudate, posterior oropharyngeal edema, posterior oropharyngeal erythema, tonsillar abscesses or cobblestoning. Tonsils are 1+ on the right. Tonsils are 1+ on the left. No tonsillar exudate.   Eyes: Pupils are equal, round, and reactive to light. Conjunctivae, EOM and lids are normal. No scleral icterus.   Neck: Trachea normal, normal range of motion, full passive range of motion without pain and phonation normal. Neck supple. No neck rigidity. No edema and no erythema present.   Cardiovascular: Normal rate, regular rhythm, normal heart sounds and normal pulses.   Pulmonary/Chest: Effort normal and breath sounds normal.  No accessory muscle usage or stridor. No respiratory distress. She has no decreased breath sounds. She has no wheezes. She has no rhonchi. She has no rales. Chest wall is not dull to percussion. She exhibits no tenderness and no bony tenderness.   Abdominal: Soft. Normal appearance and bowel sounds are normal. She exhibits no distension. There is no abdominal tenderness.   Musculoskeletal: Normal range of motion.         General: No deformity.   Lymphadenopathy:        Head (right side): No submental, no submandibular, no tonsillar, no preauricular, no posterior auricular and no occipital adenopathy present.        Head (left side): No submental, no submandibular, no tonsillar, no preauricular, no posterior auricular and no occipital adenopathy present.     She has no cervical adenopathy.        Right cervical: No superficial cervical and no deep cervical adenopathy present.       Left cervical: No superficial cervical and no deep cervical adenopathy present.   Neurological: She is alert and oriented to person, place, and time. No cranial nerve deficit. She exhibits normal muscle tone. Coordination normal.   Skin: Skin is warm, dry, intact, not diaphoretic and not pale. Psychiatric: Her speech is normal and behavior is normal. Judgment and thought content normal.   Nursing note and vitals reviewed.        Assessment:       1. Pharyngitis, unspecified etiology    2. Nonintractable headache, unspecified chronicity pattern, unspecified headache type    3. Head ache        Plan:         Pharyngitis, unspecified etiology    Nonintractable headache, unspecified chronicity pattern, unspecified headache type  -     butalbital-acetaminophen-caffeine -40 mg (FIORICET, ESGIC) -40 mg per tablet; Take 1 tablet by mouth every 6 (six) hours as needed for Pain or Headaches.  Dispense: 12 tablet; Refill: 0    Head ache  -     COVID-19 Routine Screening     Discussed with patient will swab today for coronavirus rule this  "out as an etiology.  Discussed home isolation and precautions. Discussed diagnosis with patient as well as treatment and home care. Discussed return to clinic precautions vs ER precautions. All patients questions answered. Patient verbalized understanding. Patient agreed with plan of care.         Self-Care for Headaches  Most headaches aren't serious and can be relieved with self-care. But some headaches may be a sign of another health problem like eye trouble or high blood pressure. To find the best treatment, learn what kind of headaches you get. For tension headaches, self-care will usually help. To treat migraines, ask your healthcare provider for advice. It is also possible to get both tension and migraine headaches. Self-care involves relieving the pain and avoiding headache triggers if you can.    Ways to reduce pain and tension  Try these steps:  · Apply a cold compress or ice pack to the pain site.  · Drink fluids. If nausea makes it hard to drink, try sucking on ice.  · Rest. Protect yourself from bright light and loud noises.  · Calm your emotions by imagining a peaceful scene.  · Massage tight neck, shoulder, and head muscles.  · To relax muscles, soak in a hot bath or use a hot shower.  Use medicines  Aspirin or aspirin substitutes, such as ibuprofen and acetaminophen, can relieve headache. Remember: Never give aspirin to anyone 18 years old or younger because of the risk of developing Reye syndrome. Use pain medicines only when necessary.  Track your headaches  Keeping a headache diary can help you and your healthcare provider identify what's causing your headaches:  · Note when each headache happens.  · Identify your activities and the foods you've eaten 6 to 8 hours before the headache began.  · Look for any trends or "triggers."  Signs of tension headache  Any of the following can be signs:  · Dull pain or feeling of pressure in a tight band around your head  · Pain in your neck or " "shoulders  · Headache without a definite beginning or end  · Headache after an activity such as driving or working on a computer  Signs of migraine  Any of the following can be signs:  · Throbbing pain on one or both sides of your head  · Nausea or vomiting  · Extreme sensitivity to light, sound, and smells  · Bright spots, flashes, or other visual changes  · Pain or nausea so severe that you can't continue your daily activities  Call your healthcare provider   If you have any of the following symptoms, contact your healthcare provider:  · A headache that lingers after a recent injury or bump to the head.  · A fever with a stiff neck or pain when you bend your head toward your chest.  · A headache along with slurred speech, changes in your vision, or numbness or weakness in your arms or legs.  · A headache for longer than 3 days.  · Frequent headaches, especially in the morning.  · Headaches with seizures   · Seek immediate medical attention if you have a headache that you would call "the worst headache you have ever had."   Date Last Reviewed: 10/4/2015  © 3157-7212 [employer]. [address] [place] [address]. All rights reserved. This information is not intended as a substitute for professional medical care. Always follow your healthcare professional's instructions.        Self-Care for Sore Throats    Sore throats happen for many reasons, such as colds, allergies, and infections caused by viruses or bacteria. In any case, your throat becomes red and sore. Your goal for self-care is to reduce your discomfort while giving your throat a chance to heal.  Moisten and soothe your throat  Tips include the following:  · Try a sip of water first thing after waking up.  · Keep your throat moist by drinking 6 or more glasses of clear liquids every day.  · Run a cool-air humidifier in your room overnight.  · Avoid cigarette smoke.   · Suck on throat lozenges, cough drops, hard candy, ice chips, or " frozen fruit-juice bars. Use the sugar-free versions if your diet or medical condition requires them.  Gargle to ease irritation  Gargling every hour or 2 can ease irritation. Try gargling with 1 of these solutions:  · 1/4 teaspoon of salt in 1/2 cup of warm water  · An over-the-counter anesthetic gargle  Use medicine for more relief  Over-the-counter medicine can reduce sore throat symptoms. Ask your pharmacist if you have questions about which medicine to use:  · Ease pain with anesthetic sprays. Aspirin or an aspirin substitute also helps. Remember, never give aspirin to anyone 18 or younger, or if you are already taking blood thinners.   · For sore throats caused by allergies, try antihistamines to block the allergic reaction.  · Remember: unless a sore throat is caused by a bacterial infection, antibiotics wont help you.  Prevent future sore throats  Prevention tips include the following:  · Stop smoking or reduce contact with secondhand smoke. Smoke irritates the tender throat lining.  · Limit contact with pets and with allergy-causing substances, such as pollen and mold.  · When youre around someone with a sore throat or cold, wash your hands often to keep viruses or bacteria from spreading.  · Dont strain your vocal cords.  Call your healthcare provider  Contact your healthcare provider if you have:  · A temperature over 101°F (38.3°C)  · White spots on the throat  · Great difficulty swallowing  · Trouble breathing  · A skin rash  · Recent exposure to someone else with strep bacteria  · Severe hoarseness and swollen glands in the neck or jaw   Date Last Reviewed: 8/1/2016 © 2000-2017 [employer]. [address] [place] [address], [place] [address]. All rights reserved. This information is not intended as a substitute for professional medical care. Always follow your healthcare professional's instructions.      Patient Instructions   -Below are suggestions for symptomatic relief:              -Tylenol  every 4 hours OR ibuprofen every 6 hours as needed for pain/fever.              -Salt water gargles to soothe throat pain.              -Chloroseptic spray also helps to numb throat pain.              -Nasal saline spray reduces inflammation and dryness.              -Warm face compresses to help with facial sinus pain/pressure.              -Vicks vapor rub at night              -Simple foods like chicken noodle soup.              -Delsym helps with coughing at night              -Zyrtec/Claritin during the day & Benadryl at night may help with allergies.                If you DO NOT have Hypertension or any history of palpitations, it is ok to take over the counter Sudafed or Mucinex D or Allegra-D or Claritin-D or Zyrtec-D.  If you do take one of the above, it is ok to combine that with plain over the counter Mucinex or Allegra or Claritin or Zyrtec. If, for example, you are taking Zyrtec -D, you can combine that with Mucinex, but not Mucinex-D.  If you are taking Mucinex-D, you can combine that with plain Allegra or Claritin or Zyrtec.   If you DO have Hypertension or palpitations, it is safe to take Coricidin HBP for relief of sinus symptoms.    Please follow up with your Primary care provider within 2-5 days if your signs and symptoms have not resolved or worsen.     If your condition worsens or fails to improve we recommend that you receive another evaluation at the emergency room immediately or contact your primary medical clinic to discuss your concerns.   You must understand that you have received an Urgent Care treatment only and that you may be released before all of your medical problems are known or treated. You, the patient, will arrange for follow up care as instructed.     RED FLAGS/WARNING SYMPTOMS DISCUSSED WITH PATIENT THAT WOULD WARRANT EMERGENT MEDICAL ATTENTION. PATIENT VERBALIZED UNDERSTANDING.

## 2020-07-28 NOTE — PATIENT INSTRUCTIONS
"  Self-Care for Headaches  Most headaches aren't serious and can be relieved with self-care. But some headaches may be a sign of another health problem like eye trouble or high blood pressure. To find the best treatment, learn what kind of headaches you get. For tension headaches, self-care will usually help. To treat migraines, ask your healthcare provider for advice. It is also possible to get both tension and migraine headaches. Self-care involves relieving the pain and avoiding headache triggers if you can.    Ways to reduce pain and tension  Try these steps:  · Apply a cold compress or ice pack to the pain site.  · Drink fluids. If nausea makes it hard to drink, try sucking on ice.  · Rest. Protect yourself from bright light and loud noises.  · Calm your emotions by imagining a peaceful scene.  · Massage tight neck, shoulder, and head muscles.  · To relax muscles, soak in a hot bath or use a hot shower.  Use medicines  Aspirin or aspirin substitutes, such as ibuprofen and acetaminophen, can relieve headache. Remember: Never give aspirin to anyone 18 years old or younger because of the risk of developing Reye syndrome. Use pain medicines only when necessary.  Track your headaches  Keeping a headache diary can help you and your healthcare provider identify what's causing your headaches:  · Note when each headache happens.  · Identify your activities and the foods you've eaten 6 to 8 hours before the headache began.  · Look for any trends or "triggers."  Signs of tension headache  Any of the following can be signs:  · Dull pain or feeling of pressure in a tight band around your head  · Pain in your neck or shoulders  · Headache without a definite beginning or end  · Headache after an activity such as driving or working on a computer  Signs of migraine  Any of the following can be signs:  · Throbbing pain on one or both sides of your head  · Nausea or vomiting  · Extreme sensitivity to light, sound, and " "smells  · Bright spots, flashes, or other visual changes  · Pain or nausea so severe that you can't continue your daily activities  Call your healthcare provider   If you have any of the following symptoms, contact your healthcare provider:  · A headache that lingers after a recent injury or bump to the head.  · A fever with a stiff neck or pain when you bend your head toward your chest.  · A headache along with slurred speech, changes in your vision, or numbness or weakness in your arms or legs.  · A headache for longer than 3 days.  · Frequent headaches, especially in the morning.  · Headaches with seizures   · Seek immediate medical attention if you have a headache that you would call "the worst headache you have ever had."   Date Last Reviewed: 10/4/2015  © 5384-1900 [employer]. [address] [place] [address], [place] [address]. All rights reserved. This information is not intended as a substitute for professional medical care. Always follow your healthcare professional's instructions.        Self-Care for Sore Throats    Sore throats happen for many reasons, such as colds, allergies, and infections caused by viruses or bacteria. In any case, your throat becomes red and sore. Your goal for self-care is to reduce your discomfort while giving your throat a chance to heal.  Moisten and soothe your throat  Tips include the following:  · Try a sip of water first thing after waking up.  · Keep your throat moist by drinking 6 or more glasses of clear liquids every day.  · Run a cool-air humidifier in your room overnight.  · Avoid cigarette smoke.   · Suck on throat lozenges, cough drops, hard candy, ice chips, or frozen fruit-juice bars. Use the sugar-free versions if your diet or medical condition requires them.  Gargle to ease irritation  Gargling every hour or 2 can ease irritation. Try gargling with 1 of these solutions:  · 1/4 teaspoon of salt in 1/2 cup of warm water  · An over-the-counter anesthetic " gargle  Use medicine for more relief  Over-the-counter medicine can reduce sore throat symptoms. Ask your pharmacist if you have questions about which medicine to use:  · Ease pain with anesthetic sprays. Aspirin or an aspirin substitute also helps. Remember, never give aspirin to anyone 18 or younger, or if you are already taking blood thinners.   · For sore throats caused by allergies, try antihistamines to block the allergic reaction.  · Remember: unless a sore throat is caused by a bacterial infection, antibiotics wont help you.  Prevent future sore throats  Prevention tips include the following:  · Stop smoking or reduce contact with secondhand smoke. Smoke irritates the tender throat lining.  · Limit contact with pets and with allergy-causing substances, such as pollen and mold.  · When youre around someone with a sore throat or cold, wash your hands often to keep viruses or bacteria from spreading.  · Dont strain your vocal cords.  Call your healthcare provider  Contact your healthcare provider if you have:  · A temperature over 101°F (38.3°C)  · White spots on the throat  · Great difficulty swallowing  · Trouble breathing  · A skin rash  · Recent exposure to someone else with strep bacteria  · Severe hoarseness and swollen glands in the neck or jaw   Date Last Reviewed: 8/1/2016  © 1523-3223 [employer]. [address] [place] [address], [place] [address]. All rights reserved. This information is not intended as a substitute for professional medical care. Always follow your healthcare professional's instructions.      Patient Instructions   -Below are suggestions for symptomatic relief:              -Tylenol every 4 hours OR ibuprofen every 6 hours as needed for pain/fever.              -Salt water gargles to soothe throat pain.              -Chloroseptic spray also helps to numb throat pain.              -Nasal saline spray reduces inflammation and dryness.              -Warm face compresses to help  with facial sinus pain/pressure.              -Vicks vapor rub at night              -Simple foods like chicken noodle soup.              -Delsym helps with coughing at night              -Zyrtec/Claritin during the day & Benadryl at night may help with allergies.                If you DO NOT have Hypertension or any history of palpitations, it is ok to take over the counter Sudafed or Mucinex D or Allegra-D or Claritin-D or Zyrtec-D.  If you do take one of the above, it is ok to combine that with plain over the counter Mucinex or Allegra or Claritin or Zyrtec. If, for example, you are taking Zyrtec -D, you can combine that with Mucinex, but not Mucinex-D.  If you are taking Mucinex-D, you can combine that with plain Allegra or Claritin or Zyrtec.   If you DO have Hypertension or palpitations, it is safe to take Coricidin HBP for relief of sinus symptoms.    Please follow up with your Primary care provider within 2-5 days if your signs and symptoms have not resolved or worsen.     If your condition worsens or fails to improve we recommend that you receive another evaluation at the emergency room immediately or contact your primary medical clinic to discuss your concerns.   You must understand that you have received an Urgent Care treatment only and that you may be released before all of your medical problems are known or treated. You, the patient, will arrange for follow up care as instructed.     RED FLAGS/WARNING SYMPTOMS DISCUSSED WITH PATIENT THAT WOULD WARRANT EMERGENT MEDICAL ATTENTION. PATIENT VERBALIZED UNDERSTANDING.

## 2020-07-29 LAB — SARS-COV-2 RNA RESP QL NAA+PROBE: NOT DETECTED

## 2020-07-30 ENCOUNTER — OFFICE VISIT (OUTPATIENT)
Dept: FAMILY MEDICINE | Facility: CLINIC | Age: 55
End: 2020-07-30
Attending: FAMILY MEDICINE
Payer: COMMERCIAL

## 2020-07-30 VITALS
HEIGHT: 63 IN | DIASTOLIC BLOOD PRESSURE: 70 MMHG | BODY MASS INDEX: 20.62 KG/M2 | TEMPERATURE: 98 F | SYSTOLIC BLOOD PRESSURE: 110 MMHG | OXYGEN SATURATION: 99 % | WEIGHT: 116.38 LBS | HEART RATE: 57 BPM

## 2020-07-30 DIAGNOSIS — Z01.419 GYNECOLOGIC EXAM NORMAL: ICD-10-CM

## 2020-07-30 DIAGNOSIS — Z00.00 ROUTINE GENERAL MEDICAL EXAMINATION AT A HEALTH CARE FACILITY: Primary | ICD-10-CM

## 2020-07-30 DIAGNOSIS — Z12.31 ENCOUNTER FOR SCREENING MAMMOGRAM FOR BREAST CANCER: ICD-10-CM

## 2020-07-30 DIAGNOSIS — G25.81 RESTLESS LEG SYNDROME: ICD-10-CM

## 2020-07-30 DIAGNOSIS — C18.7 MALIGNANT NEOPLASM OF SIGMOID COLON: ICD-10-CM

## 2020-07-30 DIAGNOSIS — J01.01 ACUTE RECURRENT MAXILLARY SINUSITIS: ICD-10-CM

## 2020-07-30 DIAGNOSIS — R53.83 FATIGUE, UNSPECIFIED TYPE: ICD-10-CM

## 2020-07-30 PROCEDURE — 99396 PR PREVENTIVE VISIT,EST,40-64: ICD-10-PCS | Mod: S$GLB,,, | Performed by: FAMILY MEDICINE

## 2020-07-30 PROCEDURE — 3008F PR BODY MASS INDEX (BMI) DOCUMENTED: ICD-10-PCS | Mod: CPTII,S$GLB,, | Performed by: FAMILY MEDICINE

## 2020-07-30 PROCEDURE — 99396 PREV VISIT EST AGE 40-64: CPT | Mod: S$GLB,,, | Performed by: FAMILY MEDICINE

## 2020-07-30 PROCEDURE — 3008F BODY MASS INDEX DOCD: CPT | Mod: CPTII,S$GLB,, | Performed by: FAMILY MEDICINE

## 2020-07-30 PROCEDURE — 99999 PR PBB SHADOW E&M-EST. PATIENT-LVL IV: ICD-10-PCS | Mod: PBBFAC,,, | Performed by: FAMILY MEDICINE

## 2020-07-30 PROCEDURE — 99999 PR PBB SHADOW E&M-EST. PATIENT-LVL IV: CPT | Mod: PBBFAC,,, | Performed by: FAMILY MEDICINE

## 2020-07-30 RX ORDER — GABAPENTIN 300 MG/1
300 CAPSULE ORAL NIGHTLY
Qty: 90 CAPSULE | Refills: 3 | Status: SHIPPED | OUTPATIENT
Start: 2020-07-30 | End: 2021-07-22

## 2020-07-30 RX ORDER — METHYLPREDNISOLONE 4 MG/1
TABLET ORAL
Qty: 1 PACKAGE | Refills: 0 | Status: SHIPPED | OUTPATIENT
Start: 2020-07-30 | End: 2020-08-20

## 2020-07-30 NOTE — PROGRESS NOTES
Subjective:       Patient ID: Carmen Gray is a 54 y.o. female.    Chief Complaint: Annual Exam, Referral (GYN), and Headache    54 yr old pleasant white female with restless legs syndrome, insomnia, colon cA now in remission, presents today for her annual wellness check and lab work. She is also here for evaluation of bilateral frontal headache. Onset 2 week ago and stable.  She denies any weakness/tingling/numbness/weakness. She also denies any blurred vision but she complaints of dizziness and fatigue. Details as follows -      History as below - reviewed     Headache   This is a new problem. The current episode started 1 to 4 weeks ago. The problem occurs constantly. The problem has been gradually improving. The pain is located in the frontal region. The pain does not radiate. The quality of the pain is described as aching, pulsating, shooting, stabbing and throbbing. The pain is at a severity of 6/10. The pain is severe. Pertinent negatives include no abdominal pain, back pain, coughing, dizziness, drainage, eye pain, eye watering, facial sweating, hearing loss, muscle aches, nausea, neck pain, rhinorrhea, scalp tenderness, seizures, sinus pressure, sore throat, tingling, vomiting or weight loss. Nothing aggravates the symptoms. She has tried acetaminophen and NSAIDs for the symptoms. The treatment provided no relief. There is no history of recent head traumas.   Fatigue  This is a chronic problem. The current episode started more than 1 month ago. The problem occurs constantly. The problem has been unchanged. Associated symptoms include fatigue and headaches. Pertinent negatives include no abdominal pain, arthralgias, chest pain, congestion, coughing, diaphoresis, myalgias, nausea, neck pain, sore throat or vomiting. Nothing aggravates the symptoms. She has tried nothing for the symptoms. The treatment provided no relief.   Dizziness:   Chronicity:  Chronic  Progression since onset:  Gradually worsening and  unchanged  Frequency:  Every few minutes  Pain Scale:  3/10  Severity:  Mild  Duration:  Very brief  Dizziness characteristics:  Sensation of movement   Associated symptoms: headaches.no hearing loss, no nausea, no vomiting, no diaphoresis and no chest pain.  Aggravated by:  Nothing  Treatments tried:  Rest  Improvements on treatment:  No relief and mildno strokes, no neurologic disease, no head trauma, no balance testing, no ear trauma, no head trauma, no anxiety, no ear tubes, no environmental allergies and no CT head.    Review of Systems   Constitutional: Positive for fatigue. Negative for activity change, diaphoresis, unexpected weight change and weight loss.   HENT: Negative.  Negative for nasal congestion, ear discharge, hearing loss, rhinorrhea, sinus pressure/congestion, sore throat and voice change.    Eyes: Negative.  Negative for pain, discharge and visual disturbance.   Respiratory: Negative.  Negative for cough, chest tightness, shortness of breath and wheezing.    Cardiovascular: Negative.  Negative for chest pain.   Gastrointestinal: Negative.  Negative for abdominal distention, abdominal pain, anal bleeding, constipation, nausea and vomiting.   Endocrine: Negative.  Negative for cold intolerance, polydipsia and polyuria.   Genitourinary: Negative.  Negative for decreased urine volume, difficulty urinating, dysuria, frequency, menstrual problem and vaginal pain.   Musculoskeletal: Negative.  Negative for arthralgias, back pain, gait problem, myalgias and neck pain.   Integumentary:  Negative for color change, pallor and wound. Negative.   Allergic/Immunologic: Negative.  Negative for environmental allergies and immunocompromised state.   Neurological: Positive for headaches. Negative for dizziness, tingling, tremors, seizures and speech difficulty.   Hematological: Negative.  Negative for adenopathy. Does not bruise/bleed easily.   Psychiatric/Behavioral: Negative.  Negative for agitation, confusion,  decreased concentration, hallucinations, self-injury and suicidal ideas. The patient is not nervous/anxious.      PMH/PSH/FH/SH/MED/ALLERGY reviewed        Objective:       Vitals:    07/30/20 1108   BP: 110/70   Pulse: (!) 57   Temp: 98.4 °F (36.9 °C)       Physical Exam  Constitutional:       General: She is not in acute distress.     Appearance: She is well-developed. She is not diaphoretic.   HENT:      Head: Normocephalic and atraumatic.      Nose: Mucosal edema present.      Right Sinus: Frontal sinus tenderness present. No maxillary sinus tenderness.      Left Sinus: Frontal sinus tenderness present. No maxillary sinus tenderness.   Eyes:      Pupils: Pupils are equal, round, and reactive to light.   Neck:      Musculoskeletal: Normal range of motion and neck supple.   Cardiovascular:      Rate and Rhythm: Normal rate and regular rhythm.      Heart sounds: Normal heart sounds. No murmur. No friction rub. No gallop.    Pulmonary:      Effort: Pulmonary effort is normal. No respiratory distress.      Breath sounds: Normal breath sounds. No wheezing or rales.   Skin:     General: Skin is warm and dry.   Neurological:      Mental Status: She is alert and oriented to person, place, and time.         Assessment:       1. Routine general medical examination at a health care facility    2. Encounter for screening mammogram for breast cancer    3. Restless leg syndrome    4. Gynecologic exam normal    5. Fatigue, unspecified type    6. Malignant neoplasm of sigmoid colon    7. Acute recurrent maxillary sinusitis        Plan:           Carmen was seen today for annual exam, referral and headache.    Diagnoses and all orders for this visit:    Routine general medical examination at a health care facility  -     CBC auto differential; Future  -     Comprehensive metabolic panel; Future  -     Lipid Panel; Future  -     TSH; Future  -     Vitamin D; Future  -     Vitamin B12; Future    Encounter for screening mammogram  for breast cancer  -     Mammo Digital Screening Bilateral With CAD; Future    Restless leg syndrome  -     gabapentin (NEURONTIN) 300 MG capsule; Take 1 capsule (300 mg total) by mouth every evening.  -     TSH; Future    Gynecologic exam normal  -     Ambulatory referral/consult to Obstetrics / Gynecology; Future    Fatigue, unspecified type  -     CBC auto differential; Future  -     Comprehensive metabolic panel; Future  -     TSH; Future  -     Vitamin D; Future  -     Vitamin B12; Future    Malignant neoplasm of sigmoid colon    Acute recurrent maxillary sinusitis  -     methylPREDNISolone (MEDROL DOSEPACK) 4 mg tablet; use as directed      Wellness check  -normal exam  -labs    Acute sinusitis  -Advised saline irrigation, warm humidifier, steam inhalation, vicks vaporub, claritin D for congestion  -medrol dose pack    Colon CA  -now in remission    RLS  -controlled    Tobacco use  -trying to quit    Spent adequate time in obtaining history and explaining differentials    Follow up in about 1 year (around 7/30/2021), or if symptoms worsen or fail to improve.

## 2020-08-13 ENCOUNTER — HOSPITAL ENCOUNTER (OUTPATIENT)
Dept: RADIOLOGY | Facility: HOSPITAL | Age: 55
Discharge: HOME | End: 2020-08-13
Attending: FAMILY MEDICINE
Payer: COMMERCIAL

## 2020-08-13 DIAGNOSIS — Z12.31 ENCOUNTER FOR SCREENING MAMMOGRAM FOR MALIGNANT NEOPLASM OF BREAST: ICD-10-CM

## 2020-08-13 PROCEDURE — 77067 SCR MAMMO BI INCL CAD: CPT | Mod: 26,,, | Performed by: RADIOLOGY

## 2020-08-13 PROCEDURE — 77067 SCR MAMMO BI INCL CAD: CPT | Mod: TC

## 2020-08-13 PROCEDURE — 77067 MAMMO DIGITAL SCREENING BILAT WITH TOMOSYNTHESIS_CAD: ICD-10-PCS | Mod: 26,,, | Performed by: RADIOLOGY

## 2020-08-13 PROCEDURE — 77063 BREAST TOMOSYNTHESIS BI: CPT | Mod: 26,,, | Performed by: RADIOLOGY

## 2020-08-13 PROCEDURE — 77063 MAMMO DIGITAL SCREENING BILAT WITH TOMOSYNTHESIS_CAD: ICD-10-PCS | Mod: 26,,, | Performed by: RADIOLOGY

## 2020-08-25 ENCOUNTER — PATIENT OUTREACH (OUTPATIENT)
Dept: ADMINISTRATIVE | Facility: OTHER | Age: 55
End: 2020-08-25

## 2020-08-25 NOTE — PROGRESS NOTES
Health Maintenance Due   Topic Date Due    TETANUS VACCINE  10/06/1983    Shingles Vaccine (1 of 2) 10/06/2015    Pneumococcal Vaccine (Highest Risk) (2 of 3 - PCV13) 01/31/2019    Pap Smear with HPV Cotest  02/27/2020     Updates were requested from care everywhere.  Chart was reviewed for overdue Proactive [facility] Encounters (JAMAL) topics (CRS, Breast Cancer Screening, Eye exam)  Health Maintenance has been updated.  LINKS immunization registry triggered.  Immunizations were reconciled.

## 2020-08-27 ENCOUNTER — OFFICE VISIT (OUTPATIENT)
Dept: OBSTETRICS AND GYNECOLOGY | Facility: CLINIC | Age: 55
End: 2020-08-27
Attending: FAMILY MEDICINE
Payer: COMMERCIAL

## 2020-08-27 VITALS
SYSTOLIC BLOOD PRESSURE: 110 MMHG | BODY MASS INDEX: 21.63 KG/M2 | DIASTOLIC BLOOD PRESSURE: 72 MMHG | WEIGHT: 120.13 LBS

## 2020-08-27 DIAGNOSIS — Z01.419 ENCOUNTER FOR ANNUAL ROUTINE GYNECOLOGICAL EXAMINATION: Primary | ICD-10-CM

## 2020-08-27 DIAGNOSIS — Z12.4 PAP SMEAR FOR CERVICAL CANCER SCREENING: ICD-10-CM

## 2020-08-27 DIAGNOSIS — Z01.419 GYNECOLOGIC EXAM NORMAL: ICD-10-CM

## 2020-08-27 DIAGNOSIS — Z12.31 SCREENING MAMMOGRAM, ENCOUNTER FOR: ICD-10-CM

## 2020-08-27 PROCEDURE — 99386 PREV VISIT NEW AGE 40-64: CPT | Mod: S$GLB,,, | Performed by: OBSTETRICS & GYNECOLOGY

## 2020-08-27 PROCEDURE — 99386 PR PREVENTIVE VISIT,NEW,40-64: ICD-10-PCS | Mod: S$GLB,,, | Performed by: OBSTETRICS & GYNECOLOGY

## 2020-08-27 PROCEDURE — 87624 HPV HI-RISK TYP POOLED RSLT: CPT

## 2020-08-27 PROCEDURE — 3008F PR BODY MASS INDEX (BMI) DOCUMENTED: ICD-10-PCS | Mod: CPTII,S$GLB,, | Performed by: OBSTETRICS & GYNECOLOGY

## 2020-08-27 PROCEDURE — 99999 PR PBB SHADOW E&M-EST. PATIENT-LVL III: ICD-10-PCS | Mod: PBBFAC,,, | Performed by: OBSTETRICS & GYNECOLOGY

## 2020-08-27 PROCEDURE — 88175 CYTOPATH C/V AUTO FLUID REDO: CPT

## 2020-08-27 PROCEDURE — 99999 PR PBB SHADOW E&M-EST. PATIENT-LVL III: CPT | Mod: PBBFAC,,, | Performed by: OBSTETRICS & GYNECOLOGY

## 2020-08-27 PROCEDURE — 3008F BODY MASS INDEX DOCD: CPT | Mod: CPTII,S$GLB,, | Performed by: OBSTETRICS & GYNECOLOGY

## 2020-08-27 NOTE — LETTER
August 27, 2020      Robin Corbett MD  [address]  [address]  [place] [address]           [facility]  [address]  [place] [address]  Phone: [phone]          Patient: Carmen Gray   MR Number: [id number]   YOB: 1965   Date of Visit: 8/27/2020       Dear Dr. Robin Corbett:    Thank you for referring Carmen Gray to me for evaluation. Attached you will find relevant portions of my assessment and plan of care.    If you have questions, please do not hesitate to call me. I look forward to following Carmen Gray along with you.    Sincerely,    Marly Hutchinson MD    Enclosure  CC:  No Recipients    If you would like to receive this communication electronically, please contact [email or web address] or [phone] to request more information on [facility] access.    For providers and/or their staff who would like to refer a patient to [facility], please contact us through our one-stop-shop provider referral line, [facility] , at [phone].    If you feel you have received this communication in error or would no longer like to receive these types of communications, please e-mail [email or web address]

## 2020-08-27 NOTE — PROGRESS NOTES
Chief Complaint   Patient presents with    Well Woman       HISTORY OF PRESENT ILLNESS:   Carmen Gray is a 54 y.o. female h/o colon cancer who presents for well woman exam.  No LMP recorded (lmp unknown). Patient is postmenopausal.. Menopause at age 45  She has no complaints.   Declines STD testing.      Past Medical History:   Diagnosis Date    Anxiety     Colon cancer 2016    Depression     Insomnia     Restless leg syndrome    plit  Past Surgical History:   Procedure Laterality Date    CHOLECYSTECTOMY      COLONOSCOPY N/A 2/7/2017    Procedure: COLONOSCOPY  split;  Surgeon: Dallas Mar MD;  Location: [facility];  Service: Endoscopy;  Laterality: N/A;    COLONOSCOPY N/A 2/8/2018    Procedure: COLONOSCOPY;  Surgeon: Polo Abel MD;  [facility]  Service: Endoscopy;  Laterality: N/A;    COLONOSCOPY N/A 5/20/2019    Procedure: COLONOSCOPY;  Surgeon: Polo Abel MD;  [facility]  Service: Endoscopy;  Laterality: N/A;    gastric sleeve  2015    HERNIA REPAIR      hiatal     TUBAL LIGATION     ;  Surgeon: Dallas Mar MD;  Location: [facility];  Service: Endoscopy;  Laterality: N/A;    COLONOSCOPY N/A 2/8/2018    Procedure: COLONOSCOPY;  Surgeon: Polo Abel MD;  [facility]  Service: Endoscopy;  Laterality: N/A;    COLONOSCOPY N/A 5/20/2019    Procedure: COLONOSCOPY;  Surgeon: Polo Abel MD;  [facility]  Service: Endoscopy;  Laterality: N/A;    gastric sleeve  2015    HERNIA REPAIR      hiatal     TUBAL LIGATION          Socioeconomic History    Marital status:      Spouse name: Not on file    Number of children: Not on file    Years of education: Not on file    Highest education level: Not on file   Occupational History    Not on file   Social Needs    Financial resource strain: Not on file    Food insecurity     Worry: Not on file     Inability:  Not on file    Transportation needs     Medical: Not on file     Non-medical: Not on file   Tobacco Use    Smoking status: Current Every Day Smoker     Packs/day: 0.50     Years: 30.00     Pack years: 15.00     Types: Cigarettes    Smokeless tobacco: Never Used   Substance and Sexual Activity    Alcohol use: Yes     Comment: social (history of heavy drinking around the time of her divorce)    Drug use: No    Sexual activity: Not on file   Lifestyle    Physical activity     Days per week: Not on file     Minutes per session: Not on file    Stress: Not on file   Relationships    Social connections     Talks on phone: Not on file     Gets together: Not on file     Attends Shinto service: Not on file     Active member of club or organization: Not on file     Attends meetings of clubs or organizations: Not on file     Relationship status: Not on file   Other Topics Concern    Not on file   Social History Narrative    Not on file       Family History   Problem Relation Age of Onset    Hypertension Mother     Hypertension Father     Hypertension Brother     Thyroid disease Unknown     Anesthesia problems Neg Hx          OB History    Para Term  AB Living   1 1 1     1   SAB TAB Ectopic Multiple Live Births                  # Outcome Date GA Lbr Rajinder/2nd Weight Sex Delivery Anes PTL Lv   1 Term                COMPREHENSIVE GYN HISTORY:  PAP History: Denies abnormal Paps; 2015 NILM/HPV-  Infection History: Denies STDs. Denies PID.  Benign History: Denies uterine fibroids. Denies ovarian cysts. Denies endometriosis Denies other conditions.  Cancer History: Denies cervical cancer. Denies uterine cancer or hyperplasia. Denies ovarian cancer. Denies vulvar cancer or pre-cancer. Denies vaginal cancer or pre-cancer. Denies breast cancer. Denies colon cancer.  Cycle: 5d  Menopause at 45    ROS:  GENERAL: Denies weight gain or weight loss. Feeling well overall.   SKIN: Denies rash or lesions.    HEAD: Denies headache.   NODES: Denies enlarged lymph nodes.   CHEST: Denies shortness of breath.   ABDOMEN: No abdominal pain, constipation, diarrhea, nausea, vomiting or rectal bleeding.   URINARY: No frequency, dysuria, hematuria, or burning on urination.  REPRODUCTIVE: See HPI.   BREASTS: The patient denies pain, lumps, or nipple discharge.       /72   Wt 54.5 kg (120 lb 2.4 oz)   LMP  (LMP Unknown)   BMI 21.63 kg/m²     APPEARANCE: Well nourished, well developed, in no acute distress.  NECK: Neck symmetric without  thyromegaly.  NODES: No inguinal, cervical lymph node enlargement.  CHEST: Lungs clear to auscultation.  HEART: Regular rate and rhythm, no murmurs, rubs or gallops.  ABDOMEN: Soft. No tenderness or masses. No hernias. No hepatosplenomegaly.  BREASTS: Symmetrical, no skin changes or visible lesions. No palpable masses, nipple discharge or adenopathy bilaterally.  PELVIC:   VULVA: No lesions. Normal female genitalia.  URETHRAL MEATUS: Normal size and location, no lesions, no prolapse.  URETHRA: No masses, tenderness, prolapse or scarring.  VAGINA: vaginal atophy, no discharge, no significant cystocele or rectocele.  CERVIX: No lesions and discharge.  UTERUS: Normal size, regular shape, mobile, non-tender, bladder base nontender.  ADNEXA: No masses or tenderness.  PERINEUM: Normal, mo masses    Data Reviewed:     Last MMG: Date: 8/2020 BIRAD 1   Lipid profile: Date:   Lab Results   Component Value Date    CHOL 178 08/13/2020    CHOL 166 05/07/2018    CHOL 174 01/24/2017     Lab Results   Component Value Date    HDL 71 08/13/2020    HDL 64 05/07/2018    HDL 74 01/24/2017     Lab Results   Component Value Date    LDLCALC 95.8 08/13/2020    LDLCALC 91.2 05/07/2018    LDLCALC 89.4 01/24/2017     Lab Results   Component Value Date    TRIG 56 08/13/2020    TRIG 54 05/07/2018    TRIG 53 01/24/2017     Lab Results   Component Value Date    CHOLHDL 39.9 08/13/2020    CHOLHDL 38.6 05/07/2018     CHOLHDL 42.5 01/24/2017     TSH:   Lab Results   Component Value Date    TSH 0.940 08/13/2020     Colonoscopy Date: 5/2019, repeat 3 years     1. Encounter for annual routine gynecological examination    2. Screening mammogram, encounter for    3. Pap smear for cervical cancer screening    4. Gynecologic exam normal        A/P 1. Routine gyn annual exam. s/p normal breast exam and MMG ordered.  Pap with HPV cotesting ordered. STD testing: GC/CT/trich, syphilis, HBV/HCV and HIV declined. Lipid Profile, needed every 5 years, up to date. Fasting glucose, needed every 3 years, up to date.   TSH, needed every 5 years, up to date.   Colonoscopy up to date.   2. Discussed medications for vaginal atrophy with replens or vaginal estrogen. Interested in replens.     F/u in 1 yr or PRN

## 2020-09-03 LAB
HPV HR 12 DNA SPEC QL NAA+PROBE: NEGATIVE
HPV16 AG SPEC QL: NEGATIVE
HPV18 DNA SPEC QL NAA+PROBE: NEGATIVE

## 2020-09-14 LAB
FINAL PATHOLOGIC DIAGNOSIS: NORMAL
Lab: NORMAL

## 2020-09-15 ENCOUNTER — PATIENT MESSAGE (OUTPATIENT)
Dept: OBSTETRICS AND GYNECOLOGY | Facility: CLINIC | Age: 55
End: 2020-09-15

## 2020-10-01 NOTE — LETTER
October 1, 2020     Sathish Salmeron  [address] [place] [facility] [address]       Dear Sathish,    Welcome to [facility]! Our goal is to make care effective, proactive and convenient by using data you send us from home to better treat your chronic conditions.                My name is Violetta Lomas, and I am your dedicated Digital Medicine clinician. As an expert in medication management, I will help ensure that the medications you are taking continue to provide the intended benefits and help you reach your goals. You can reach me directly at [phone] or by sending me a message directly through your [facility] account.      I am Sundar Harrington and I will be your health . My job is to help you identify lifestyle changes to improve your disease control. We will talk about nutrition, exercise, and other ways you may be able to adjust your current habits to better your health. Additionally, we will help ensure you are completing the tests and screenings that are necessary to help manage your conditions. You can reach me directly at [phone] or by sending me a message directly through your [facility] account.    Most importantly, YOU are at the center of this team. Together, we will work to improve your overall health and encourage you to meet your goals for a healthier lifestyle.     What we expect from YOU:  · Please take frequent home blood pressure measurements. We ask that you take at least 1 blood pressure reading per week, but more information will better help us get you know you. Be sure you rest for a few minutes before taking the reading in a quiet, comfortable place.     Be available to receive phone calls or [employer] messages, when appropriate, from your care team. Please let us know if there are any specific days or times that work best for us to reach you via phone.     Complete routine tests and screenings. Dont worry, we will help keep you on track!           What you       [address] ? [place] [address] ? Phone [phone] ? Fax [phone]           Return to Work/School Status    Patient: Carmen Gray  YOB: 1965   Date: 07/28/2020      [facility] has adopted CDCs time-based return to work/school strategy for persons with confirmed or suspected COVID19. [facility] does not recommend using a test-based strategy for returning to work/school after COVID-19 infection. CDC has reported prolonged positive test results without evidence of infectiousness. At this time, positive specimens capable of producing disease have not been isolated more than 9 days after onset of illness.   Symptomatic persons with confirmed COVID-19 or suspected COVID-19 can return to work/school after:    At least 3 days (72 hours) have passed since recovery defined as resolution of fever without the use of fever-reducing medications AND improvement in respiratory symptoms (e.g., cough, shortness of breath)    AND, at least 10 days have passed since first positive test  Asymptomatic persons with confirmed COVID-19 can return to work after:   At least 10 days have passed since the positive laboratory test and the person remains asymptomatic.  More information about the science behind the symptom-based return to work/school can be found at: https://www.cdc.gov/coronavirus/2019-ncov/community/jrfgcnfb-lygcgcdhzyf-jjefksrow.html    Sincerely,    GLORIA Caal       should expect from your Digital Medicine Care Team:   We will work with you to create a personalized plan of care and provide you with encouragement and education, including regarding lifestyle changes, that could help you manage your disease states.     We will adjust your current medications, if needed, and continue to monitor your long-term progress.     We will provide you and your physician with monthly progress reports after you have been in the program for more than 30 days.     We will send you reminders through [employer] and text messages to help ensure you do not miss any testing deadlines to help manage your disease states.    You will be able to reach us by phone or through your [employer] account by clicking our names under Care Team on the right side of the home screen.    We look forward to working with you to help manage your health,    Sincerely,    Your Digital Medicine Team    Please visit our websites to learn more:   · Hypertension: [email or web address]      Remember, we are not available for emergencies. If you have an emergency, please contact your doctors office directly or call [facility] on-call ([phone] or [phone]) or 911.

## 2020-10-07 ENCOUNTER — HOSPITAL ENCOUNTER (OUTPATIENT)
Dept: RADIOLOGY | Facility: HOSPITAL | Age: 55
Discharge: HOME | End: 2020-10-07
Attending: FAMILY MEDICINE
Payer: COMMERCIAL

## 2020-10-07 ENCOUNTER — OFFICE VISIT (OUTPATIENT)
Dept: FAMILY MEDICINE | Facility: CLINIC | Age: 55
End: 2020-10-07
Attending: FAMILY MEDICINE
Payer: COMMERCIAL

## 2020-10-07 ENCOUNTER — TELEPHONE (OUTPATIENT)
Dept: FAMILY MEDICINE | Facility: CLINIC | Age: 55
End: 2020-10-07

## 2020-10-07 VITALS
BODY MASS INDEX: 20.66 KG/M2 | HEART RATE: 66 BPM | OXYGEN SATURATION: 98 % | TEMPERATURE: 99 F | SYSTOLIC BLOOD PRESSURE: 114 MMHG | HEIGHT: 63 IN | DIASTOLIC BLOOD PRESSURE: 74 MMHG | WEIGHT: 116.63 LBS

## 2020-10-07 DIAGNOSIS — R10.31 RIGHT LOWER QUADRANT ABDOMINAL PAIN: Primary | ICD-10-CM

## 2020-10-07 DIAGNOSIS — R10.31 RIGHT LOWER QUADRANT ABDOMINAL PAIN: ICD-10-CM

## 2020-10-07 DIAGNOSIS — R10.9 RIGHT FLANK PAIN: ICD-10-CM

## 2020-10-07 PROCEDURE — 74176 CT RENAL STONE STUDY ABD PELVIS WO: ICD-10-PCS | Mod: 26,,, | Performed by: RADIOLOGY

## 2020-10-07 PROCEDURE — 99999 PR PBB SHADOW E&M-EST. PATIENT-LVL III: ICD-10-PCS | Mod: PBBFAC,,, | Performed by: FAMILY MEDICINE

## 2020-10-07 PROCEDURE — 74176 CT ABD & PELVIS W/O CONTRAST: CPT | Mod: 26,,, | Performed by: RADIOLOGY

## 2020-10-07 PROCEDURE — 99214 OFFICE O/P EST MOD 30 MIN: CPT | Mod: S$GLB,,, | Performed by: FAMILY MEDICINE

## 2020-10-07 PROCEDURE — 99214 PR OFFICE/OUTPT VISIT, EST, LEVL IV, 30-39 MIN: ICD-10-PCS | Mod: S$GLB,,, | Performed by: FAMILY MEDICINE

## 2020-10-07 PROCEDURE — 74176 CT ABD & PELVIS W/O CONTRAST: CPT | Mod: TC

## 2020-10-07 PROCEDURE — 3008F BODY MASS INDEX DOCD: CPT | Mod: CPTII,S$GLB,, | Performed by: FAMILY MEDICINE

## 2020-10-07 PROCEDURE — 99999 PR PBB SHADOW E&M-EST. PATIENT-LVL III: CPT | Mod: PBBFAC,,, | Performed by: FAMILY MEDICINE

## 2020-10-07 PROCEDURE — 3008F PR BODY MASS INDEX (BMI) DOCUMENTED: ICD-10-PCS | Mod: CPTII,S$GLB,, | Performed by: FAMILY MEDICINE

## 2020-10-07 NOTE — TELEPHONE ENCOUNTER
Spoke to pt and states that she has back and abdominal pain. Pt requested to be seen today. Pt was scheduled for an appt today.

## 2020-10-07 NOTE — PROGRESS NOTES
Subjective:       Patient ID: Carmen Gray is a 55 y.o. female.    Chief Complaint: Abdominal Pain (Right Side) and Back Pain    55 yr old pleasant white female with restless legs syndrome, insomnia, colon cA now in remission, presents today for evaluation of RLQ and right flank pain. Onset 3-4 days ago and rapidly worsening. She is constipated as well and trying OTC meds. No fever or chills. This is the worst pain for her. No hematuria, dysuria, diarrhea. Details as follows -      Abdominal Pain  This is a new problem. The current episode started in the past 7 days. The onset quality is sudden. The problem occurs constantly. The problem has been rapidly worsening. The pain is located in the RLQ, periumbilical region and right flank. The pain is at a severity of 10/10. The pain is severe. The quality of the pain is aching, colicky and cramping. The abdominal pain does not radiate. Pertinent negatives include no anorexia, arthralgias, belching, constipation, dysuria, flatus, frequency, headaches, hematuria, melena, myalgias, nausea or weight loss. Nothing aggravates the pain. The pain is relieved by nothing. The treatment provided no relief.   Back Pain  Associated symptoms include abdominal pain. Pertinent negatives include no chest pain, dysuria, headaches or weight loss.     Review of Systems   Constitutional: Negative.  Negative for activity change, diaphoresis, unexpected weight change and weight loss.   HENT: Negative.  Negative for nasal congestion, ear discharge, hearing loss, rhinorrhea, sore throat and voice change.    Eyes: Negative.  Negative for pain, discharge and visual disturbance.   Respiratory: Negative.  Negative for chest tightness, shortness of breath and wheezing.    Cardiovascular: Negative.  Negative for chest pain.   Gastrointestinal: Positive for abdominal pain. Negative for abdominal distention, anal bleeding, anorexia, constipation, flatus, melena and nausea.   Endocrine: Negative.   Negative for cold intolerance, polydipsia and polyuria.   Genitourinary: Positive for flank pain. Negative for decreased urine volume, difficulty urinating, dysuria, frequency, hematuria, menstrual problem and vaginal pain.   Musculoskeletal: Positive for back pain. Negative for arthralgias, gait problem and myalgias.   Integumentary:  Negative for color change, pallor and wound. Negative.   Allergic/Immunologic: Negative.  Negative for environmental allergies and immunocompromised state.   Neurological: Negative.  Negative for dizziness, tremors, seizures, speech difficulty and headaches.   Hematological: Negative.  Negative for adenopathy. Does not bruise/bleed easily.   Psychiatric/Behavioral: Negative.  Negative for agitation, confusion, decreased concentration, hallucinations, self-injury and suicidal ideas. The patient is not nervous/anxious.          PMH/PSH/FH/SH/MED/ALLERGY reviewed    Objective:       Vitals:    10/07/20 1456   BP: 114/74   Pulse: 66   Temp: 98.7 °F (37.1 °C)       Physical Exam  Constitutional:       General: She is not in acute distress.     Appearance: She is well-developed. She is not diaphoretic.   HENT:      Head: Normocephalic and atraumatic.      Right Ear: External ear normal.      Left Ear: External ear normal.      Nose: Nose normal.      Mouth/Throat:      Pharynx: No oropharyngeal exudate.   Eyes:      General: No scleral icterus.        Right eye: No discharge.         Left eye: No discharge.      Conjunctiva/sclera: Conjunctivae normal.      Pupils: Pupils are equal, round, and reactive to light.   Neck:      Musculoskeletal: Normal range of motion and neck supple.      Thyroid: No thyromegaly.      Vascular: No JVD.      Trachea: No tracheal deviation.   Cardiovascular:      Rate and Rhythm: Normal rate and regular rhythm.      Heart sounds: Normal heart sounds. No murmur. No friction rub. No gallop.    Pulmonary:      Effort: Pulmonary effort is normal.      Breath sounds:  Normal breath sounds. No stridor. No wheezing or rales.   Chest:      Chest wall: No tenderness.   Abdominal:      General: Bowel sounds are normal. There is no distension.      Palpations: Abdomen is soft. There is no mass.      Tenderness: There is abdominal tenderness in the right lower quadrant. There is right CVA tenderness. There is no guarding or rebound.      Hernia: No hernia is present.       Musculoskeletal: Normal range of motion.         General: No tenderness.   Lymphadenopathy:      Cervical: No cervical adenopathy.   Skin:     General: Skin is warm and dry.      Coloration: Skin is not pale.      Findings: No erythema or rash.   Neurological:      Mental Status: She is alert and oriented to person, place, and time.      Cranial Nerves: No cranial nerve deficit.      Motor: No abnormal muscle tone.      Coordination: Coordination normal.      Deep Tendon Reflexes: Reflexes are normal and symmetric. Reflexes normal.   Psychiatric:         Behavior: Behavior normal.         Thought Content: Thought content normal.         Judgment: Judgment normal.         Assessment:       1. Right lower quadrant abdominal pain    2. Right flank pain        Plan:           Carmen was seen today for abdominal pain and back pain.    Diagnoses and all orders for this visit:    Right lower quadrant abdominal pain  -     CT Renal Stone Study ABD Pelvis WO; Future    Right flank pain  -     CT Renal Stone Study ABD Pelvis WO; Future      RLQ/right flank pain  -DD appendicitis, kidney stone, constipation, colitis  -CT stat  -ER precautions given    Spent adequate time in obtaining history and explaining differentials    25 minutes spent during this visit of which greater than 50% devoted to face-face counseling and coordination of care regarding diagnosis and management plan    Follow up if symptoms worsen or fail to improve.

## 2020-10-07 NOTE — TELEPHONE ENCOUNTER
----- Message from Diana Beck sent at 10/7/2020 11:01 AM CDT -----  Contact: SELF [phone]  Patient would like to speak with you about being seen today for pain on side. Please advise.

## 2020-10-08 ENCOUNTER — TELEPHONE (OUTPATIENT)
Dept: FAMILY MEDICINE | Facility: CLINIC | Age: 55
End: 2020-10-08

## 2020-10-08 ENCOUNTER — PATIENT MESSAGE (OUTPATIENT)
Dept: FAMILY MEDICINE | Facility: CLINIC | Age: 55
End: 2020-10-08

## 2020-10-08 ENCOUNTER — LAB VISIT (OUTPATIENT)
Dept: LAB | Facility: HOSPITAL | Age: 55
End: 2020-10-08
Payer: COMMERCIAL

## 2020-10-08 ENCOUNTER — OFFICE VISIT (OUTPATIENT)
Dept: SURGERY | Facility: CLINIC | Age: 55
End: 2020-10-08
Payer: COMMERCIAL

## 2020-10-08 VITALS
BODY MASS INDEX: 20.71 KG/M2 | DIASTOLIC BLOOD PRESSURE: 103 MMHG | HEART RATE: 83 BPM | SYSTOLIC BLOOD PRESSURE: 154 MMHG | HEIGHT: 63 IN | WEIGHT: 116.88 LBS

## 2020-10-08 DIAGNOSIS — R10.9 FLANK PAIN: ICD-10-CM

## 2020-10-08 DIAGNOSIS — K59.00 CONSTIPATION, UNSPECIFIED CONSTIPATION TYPE: ICD-10-CM

## 2020-10-08 DIAGNOSIS — R10.30 LOWER ABDOMINAL PAIN: Primary | ICD-10-CM

## 2020-10-08 LAB
BACTERIA #/AREA URNS AUTO: ABNORMAL /HPF
BILIRUB UR QL STRIP: NEGATIVE
CAOX CRY UR QL COMP ASSIST: ABNORMAL
CLARITY UR REFRACT.AUTO: ABNORMAL
COLOR UR AUTO: YELLOW
GLUCOSE UR QL STRIP: NEGATIVE
HGB UR QL STRIP: ABNORMAL
KETONES UR QL STRIP: NEGATIVE
LEUKOCYTE ESTERASE UR QL STRIP: NEGATIVE
MICROSCOPIC COMMENT: ABNORMAL
NITRITE UR QL STRIP: NEGATIVE
PH UR STRIP: 5 [PH] (ref 5–8)
PROT UR QL STRIP: NEGATIVE
RBC #/AREA URNS AUTO: 6 /HPF (ref 0–4)
SP GR UR STRIP: 1.03 (ref 1–1.03)
SQUAMOUS #/AREA URNS AUTO: 2 /HPF
URN SPEC COLLECT METH UR: ABNORMAL
WBC #/AREA URNS AUTO: 1 /HPF (ref 0–5)

## 2020-10-08 PROCEDURE — 99999 PR PBB SHADOW E&M-EST. PATIENT-LVL III: ICD-10-PCS | Mod: PBBFAC,,, | Performed by: NURSE PRACTITIONER

## 2020-10-08 PROCEDURE — 99212 OFFICE O/P EST SF 10 MIN: CPT | Mod: S$GLB,,, | Performed by: NURSE PRACTITIONER

## 2020-10-08 PROCEDURE — 99999 PR PBB SHADOW E&M-EST. PATIENT-LVL III: CPT | Mod: PBBFAC,,, | Performed by: NURSE PRACTITIONER

## 2020-10-08 PROCEDURE — 81001 URINALYSIS AUTO W/SCOPE: CPT

## 2020-10-08 PROCEDURE — 3008F BODY MASS INDEX DOCD: CPT | Mod: CPTII,S$GLB,, | Performed by: NURSE PRACTITIONER

## 2020-10-08 PROCEDURE — 99212 PR OFFICE/OUTPT VISIT, EST, LEVL II, 10-19 MIN: ICD-10-PCS | Mod: S$GLB,,, | Performed by: NURSE PRACTITIONER

## 2020-10-08 PROCEDURE — 3008F PR BODY MASS INDEX (BMI) DOCUMENTED: ICD-10-PCS | Mod: CPTII,S$GLB,, | Performed by: NURSE PRACTITIONER

## 2020-10-08 RX ORDER — POLYETHYLENE GLYCOL 3350 17 G/17G
17 POWDER, FOR SOLUTION ORAL 2 TIMES DAILY
Qty: 1530 G | Refills: 6 | Status: SHIPPED | OUTPATIENT
Start: 2020-10-08 | End: 2022-10-05

## 2020-10-08 NOTE — PROGRESS NOTES
"CRS Office Visit History and Physical    Referring Md:   Aaareferral Self  No address on file    SUBJECTIVE:     Chief Complaint: RLQ abdominal pain    History of Present Illness:  The patient is known patient to this practice, new to me.   Course is as follows:  Patient is a 55 y.o. female presents with lower abdominal pain. Started Friday as R flank pain, spread to bilat flank pain, then radiated around front to generalized lower abdominal pain.  Noted she hadn't had a bowel movement in 2 weeks. Took dulcolax and suppositories with minimal improvement. Then mag citrate 1/2 bottle with better results and resolution of symptoms.  Then went out with friends for dinner and a show for her birthday 2 days ago, and symptoms have returned.  Seen by PCP yesterday. CT normal  Has tried tylenol with mild improvement.  Associated bleeding: no  Previous anorectal procedures: no  denies straining/prolonged time on toilet with bowel movements.  is not currently taking fiber supplement or stool softener  Blood thinners: No  She reports many episodes of "passing out from dehydration"    Last Colonoscopy completed on 5/20/2019  - Patent end-to-end colo-rectal anastomosis.   - The examination was otherwise normal.   - No specimens collected.   - repeat in 3 years  Family history of colorectal cancer or IBD: personal hx of colon cancer.    Review of patient's allergies indicates:   Allergen Reactions    Codeine Itching    Hydrocodone Itching       Past Medical History:   Diagnosis Date    Anxiety     Colon cancer 2016    Depression     Insomnia     Restless leg syndrome      Past Surgical History:   Procedure Laterality Date    CHOLECYSTECTOMY      COLONOSCOPY N/A 2/7/2017    Procedure: COLONOSCOPY  split;  Surgeon: Dallas Mar MD;  [facility];  Service: Endoscopy;  Laterality: N/A;    COLONOSCOPY N/A 2/8/2018    Procedure: COLONOSCOPY;  Surgeon: Polo Abel MD;  Location: [facility] (4TH " "FLR);  Service: Endoscopy;  Laterality: N/A;    COLONOSCOPY N/A 5/20/2019    Procedure: COLONOSCOPY;  Surgeon: Polo Abel MD;  Location: [facility] (4TH FLR);  Service: Endoscopy;  Laterality: N/A;    gastric sleeve  2015    HERNIA REPAIR      hiatal     TUBAL LIGATION       Family History   Problem Relation Age of Onset    Hypertension Mother     Hypertension Father     Hypertension Brother     Thyroid disease Unknown     Anesthesia problems Neg Hx      Social History     Tobacco Use    Smoking status: Current Every Day Smoker     Packs/day: 0.50     Years: 30.00     Pack years: 15.00     Types: Cigarettes    Smokeless tobacco: Never Used   Substance Use Topics    Alcohol use: Yes     Comment: social (history of heavy drinking around the time of her divorce)    Drug use: No        Review of Systems:  Review of Systems   Constitutional: Negative for chills, fever and weight loss.   Respiratory: Negative for cough, shortness of breath and wheezing.    Cardiovascular: Negative for chest pain and palpitations.   Gastrointestinal: Positive for abdominal pain and constipation. Negative for blood in stool, diarrhea and melena.   Genitourinary: Positive for flank pain. Negative for dysuria and hematuria.   Musculoskeletal: Negative for falls, joint pain and myalgias.   Skin: Negative for itching and rash.   Psychiatric/Behavioral: The patient is not nervous/anxious.    All other systems reviewed and are negative.      OBJECTIVE:     Vital Signs (Most Recent)  Blood Pressure (Abnormal) 154/103 (BP Location: Right arm, Patient Position: Sitting, BP Method: Large (Automatic))   Pulse 83   Height 5' 2.5" (1.588 m)   Weight 53 kg (116 lb 13.5 oz)   Last Menstrual Period  (LMP Unknown)   Body Mass Index 21.03 kg/m²     Physical Exam:  General: White female in no distress   Neuro: Alert and oriented to person, place, and time.  Moves all extremities.     HEENT: No icterus.  Trachea midline  Respiratory: " Respirations are even and unlabored, no cough or audible wheezing  Abdomen: soft, flat, bowel sounds active in all quadrants, slightly hypoactive to LLQ. Generalized lower abdominal tenderness to palpation.   Skin: Warm dry and intact, No visible rashes, no jaundice    Labs reviewed today:  Lab Results   Component Value Date    WBC 6.55 08/13/2020    HGB 13.0 08/13/2020    HCT 40.1 08/13/2020     08/13/2020    CHOL 178 08/13/2020    TRIG 56 08/13/2020    HDL 71 08/13/2020    ALT 11 08/13/2020    AST 20 08/13/2020     08/13/2020    K 4.8 08/13/2020     08/13/2020    CREATININE 0.7 08/13/2020    BUN 13 08/13/2020    CO2 28 08/13/2020    TSH 0.940 08/13/2020    HGBA1C 5.3 01/24/2017       Imaging reviewed today:  10/7/20 CT renal stone study  - Postoperative changes of the stomach and sigmoid colon with resected gallbladder.  - No evidence of  stone tract calculus especially on the right.  - No evidence of GI tract obstruction or collecting system dilatation.    Endoscopy reviewed today:  Last Colonoscopy completed on 5/20/2019  - Patent end-to-end colo-rectal anastomosis.   - The examination was otherwise normal.   - No specimens collected.   - repeat in 3 years      ASSESSMENT/PLAN:     Carmen was seen today for pain above rectum.    Diagnoses and all orders for this visit:    Lower abdominal pain    Flank pain  -     Urinalysis, Reflex to Urine Culture Urine, Clean Catch; Future    Constipation, unspecified constipation type  -     polyethylene glycol (GLYCOLAX) 17 gram/dose powder; Take 17 g by mouth 2 (two) times daily.        The patient was instructed to:  Miralax 2x/day  Urine specimen  Increase water intake to at least 8-10 glasses of water per day.  Avoid straining or spending >5min/event with bowel movements.  Follow-up in clinic prn.      RUSS Amaral  Colon and Rectal Surgery

## 2020-10-08 NOTE — TELEPHONE ENCOUNTER
Spoke to pt and informed her that her CT was normal. Pt stated that she sent a message via her Patient Portal.

## 2020-10-09 RX ORDER — NITROFURANTOIN MACROCRYSTALS 50 MG/1
50 CAPSULE ORAL EVERY 6 HOURS
Qty: 20 CAPSULE | Refills: 0 | Status: SHIPPED | OUTPATIENT
Start: 2020-10-09 | End: 2020-10-14

## 2020-10-19 ENCOUNTER — PATIENT MESSAGE (OUTPATIENT)
Dept: SURGERY | Facility: CLINIC | Age: 55
End: 2020-10-19

## 2020-10-20 NOTE — TELEPHONE ENCOUNTER
This is a pt of Dr. Abel's who would like to f/u with him. Is there anyway his 8am appt on Thursday could be changed from a virtual to an in-person visit for her to see him. She would like to see him before going out of town for her wedding on 10/28.    Thanks  Sil

## 2020-10-21 ENCOUNTER — PATIENT OUTREACH (OUTPATIENT)
Dept: ADMINISTRATIVE | Facility: OTHER | Age: 55
End: 2020-10-21

## 2020-10-21 NOTE — PROGRESS NOTES
LINKS immunization registry not responding  Care Everywhere updated  Health Maintenance updated  Chart reviewed for overdue Proactive [facility]) health maintenance testing (CRS, Breast Ca, Diabetic Eye Exam)   Orders entered:N/A

## 2020-10-22 ENCOUNTER — OFFICE VISIT (OUTPATIENT)
Dept: GASTROENTEROLOGY | Facility: CLINIC | Age: 55
End: 2020-10-22
Payer: COMMERCIAL

## 2020-10-22 VITALS
HEART RATE: 62 BPM | HEIGHT: 62 IN | DIASTOLIC BLOOD PRESSURE: 77 MMHG | WEIGHT: 117.31 LBS | BODY MASS INDEX: 21.59 KG/M2 | SYSTOLIC BLOOD PRESSURE: 115 MMHG

## 2020-10-22 DIAGNOSIS — R14.0 BLOATING: ICD-10-CM

## 2020-10-22 DIAGNOSIS — R10.30 LOWER ABDOMINAL PAIN: ICD-10-CM

## 2020-10-22 DIAGNOSIS — K59.00 CONSTIPATION, UNSPECIFIED CONSTIPATION TYPE: Primary | ICD-10-CM

## 2020-10-22 PROCEDURE — 99204 PR OFFICE/OUTPT VISIT, NEW, LEVL IV, 45-59 MIN: ICD-10-PCS | Mod: S$GLB,,, | Performed by: FAMILY MEDICINE

## 2020-10-22 PROCEDURE — 99999 PR PBB SHADOW E&M-EST. PATIENT-LVL III: CPT | Mod: PBBFAC,,, | Performed by: FAMILY MEDICINE

## 2020-10-22 PROCEDURE — 99999 PR PBB SHADOW E&M-EST. PATIENT-LVL III: ICD-10-PCS | Mod: PBBFAC,,, | Performed by: FAMILY MEDICINE

## 2020-10-22 PROCEDURE — 99204 OFFICE O/P NEW MOD 45 MIN: CPT | Mod: S$GLB,,, | Performed by: FAMILY MEDICINE

## 2020-10-22 PROCEDURE — 3008F PR BODY MASS INDEX (BMI) DOCUMENTED: ICD-10-PCS | Mod: CPTII,S$GLB,, | Performed by: FAMILY MEDICINE

## 2020-10-22 PROCEDURE — 3008F BODY MASS INDEX DOCD: CPT | Mod: CPTII,S$GLB,, | Performed by: FAMILY MEDICINE

## 2020-10-22 NOTE — PATIENT INSTRUCTIONS
1. Take Mag Citrate to clean out.  2. Next day, start Linzess 72 mcg once daily 10-15 minutes prior to breakfast  - take this dose for 1-2 weeks; if no improvement, double the dose  - take 2 pills in the morning before breakfast and see if this works  - Call/message me with an update so we can refill the prescription  3. Ensure you're drinking enough water throughout the day  - goal is at least 64 oz  4. Add high fiber foods into your diet      Women need 25 grams of fiber per day, and men need 38 grams per day, according to the [place] [employer].    If you're not meeting your recommended daily dose of fiber via food, fiber supplementation is beneficial in helping meet those daily recommendations. Be sure to drink plenty of water while taking fiber supplementation. Make sure to read fiber supplementation drug label directions regarding when and how to take the supplementation.    Dietary fiber content of frequently consumed foods  Food Fiber, g/serving   Fruits   Apple (with skin) 3.5/1 medium-sized apple   Apricot (fresh) 1.8/3 apricots   Banana 2.5/1 banana   Cantaloupe 2.7/half edible portion   Dates 13.5/1 cup (chopped)   Grapefruit 1.6/half edible portion   Grapes 2.6/10 grapes   Oranges 2.6/1 orange   Peach (with skin) 2.1/1 peach   Pear (with skin) 4.6/1 pear   Pineapple 2.2/1 cup (diced)   Prunes 11.9/11 dried prunes   Raisins 2.2/packet   Strawberries 3.0/1 cup   Juices   Apple 0.74/1 cup   Grapefruit 1.0/1 cup   Grape 1.3/1 cup   Orange 1.0/1 cup   Vegetables   Cooked   Asparagus 1.5/7 aguilar   Beans, string, green 3.4/1 cup   Broccoli 5.0/1 stalk   [place] sprouts 4.6/7-8 sprouts   Cabbage 2.9/1 cup (cooked)   Carrots 4.6/1 cup   Cauliflower 2.1/1 cup   Peas 7.2/1 cup (cooked)   Potato (with skin) 2.3/1 boiled   Spinach 4.1/1 cup (raw)   Squash, summer 3.4/1 cup (cooked, diced)   Sweet potatoes 2.7/1 baked   Zucchini 4.2/1 cup (cooked, diced)   Raw   Cucumber 0.2/6-8 slices with skin   Lettuce 2.0/1  wedge iceberg   Mushrooms 0.8/half cup (sliced)   Onions 1.3/1 cup   Peppers, green 1.0/1 pod   Tomato 1.8/1 tomato   Spinach 8.0/1 cup (chopped)   Legumes   Baked beans 18.6/1 cup   Dried peas 4.7/half cup (cooked)   Kidney beans 7.4/half cup (cooked)   Lima beans 2.6/half cup (cooked)   Lentils 1.9/half cup (cooked)   Breads, pastas, and flours   Bagels 1.1/half bagel   Bran muffins 6.3/muffin   Cracked wheat 4.1/slice   Oatmeal 5.3/1 cup   Pumpernickel bread 1.0/slice   White bread 0.55/slice   Whole-wheat bread 1.66/slice   Pasta and rice cooked   Macaroni 1.0/1 cup (cooked)   Rice, brown 2.4/1 cup (cooked)   Rice, polished 0.6/1 cup (cooked)   Spaghetti (regular) 1.0/1 cup (cooked)   Flours and grains   Bran, oat 8.3/oz   Bran, wheat 12.4/oz   Rolled oats 13.7/1 cup (cooked)   Nuts   Almonds 3.6/half cup (slivered)   Peanuts 11.7/1 cup

## 2020-10-22 NOTE — PROGRESS NOTES
[facility] Consultation Note    Reason for Consult:  The primary encounter diagnosis was Constipation, unspecified constipation type. Diagnoses of Bloating and Lower abdominal pain were also pertinent to this visit.    PCP:   Robin Corbett   [address] [facility] / [place] [address]    Referring MD:  So Whitfield, [facility]  [address] [place] [address]    HPI:  This is a 55 y.o. female here for evaluation of constipation and abdominal pain. She is a new patient. History significant for colon cancer, s/o sigmoidectomy w/ Dr Abel on 3/28/2017.    Started with lower back pains around 10/1/2020. This pain began to radiate anteriorly to RLQ and LLQ abd. Reports bloating and flatulence at this time. Had a CT with PCP that was unrevealing.     Saw SARA Zelaya NP in CRS and was recommended Miralax 2-3 times daily. This helped somewhat, but feels as if her symptoms are returning.   When she is finally able to have bowel movements, reports they are small in amount and soft. She does feel relived when she is finally able to have a BM.  Has tried Kombucha juice and apple cider vinegar.  +abd pain    Reflux - occasionally; hx of gastric sleeve w/HH repair and cholecystectomy w/2nd HH repair  - one episode every couple of months, uses Pepcid AC and this helps  Dysphagia - no  Bowel Habits - as above; reports she has always tended on the constipated side, maybe 2 Bms per weeks   Rectal Bleeding/Melena- reports occasional dark stools x months;  NSAIDs - none; Tylenol PRN  Fam Hx - personal hx of colon cancer    ROS:  Constitutional: No fevers, + chills, No unintentional weight loss  ENT: + allergies  CV: No chest pain  Pulm: No cough, No shortness of breath  Ophtho: No vision changes  GI: see HPI  Derm: No rash  Heme: No lymphadenopathy, No bruising  MSK: No arthritis  : No dysuria, No hematuria, +frequency  Endo: No hot or cold intolerance  Neuro: No syncope, No seizure  Psych: + anxiety  "regarding her wedding, No depression    Medical History:  has a past medical history of Anxiety, Colon cancer (2016), Depression, Insomnia, and Restless leg syndrome.    Surgical History:  has a past surgical history that includes Tubal ligation; gastric sleeve (2015); Cholecystectomy; Hernia repair; Colonoscopy (N/A, 2/7/2017); Colonoscopy (N/A, 2/8/2018); and Colonoscopy (N/A, 5/20/2019).    Family History: family history includes Hypertension in her brother, father, and mother; Thyroid disease in her unknown relative..     Social History:  reports that she has been smoking cigarettes. She has a 15.00 pack-year smoking history. She has never used smokeless tobacco. She reports current alcohol use. She reports that she does not use drugs.    Review of patient's allergies indicates:   Allergen Reactions    Codeine Itching    Hydrocodone Itching       Current Outpatient Medications on File Prior to Visit   Medication Sig Dispense Refill    gabapentin (NEURONTIN) 300 MG capsule Take 1 capsule (300 mg total) by mouth every evening. 90 capsule 3    polyethylene glycol (GLYCOLAX) 17 gram/dose powder Take 17 g by mouth 2 (two) times daily. 1530 g 6    RESTASIS 0.05 % ophthalmic emulsion Place 1 drop into both eyes every evening.        No current facility-administered medications on file prior to visit.          Objective Findings:    Vital Signs Reviewed:  /77   Pulse 62   Ht 5' 2" (1.575 m)   Wt 53.2 kg (117 lb 4.6 oz)   LMP  (LMP Unknown)   BMI 21.45 kg/m²   Body mass index is 21.45 kg/m².    Physical Exam:  General Appearance: Well appearing in no acute distress  Head:   Normocephalic, without obvious abnormality  Eyes:    No scleral icterus  ENT: Neck supple  Lungs: CTA bilaterally in anterior and posterior fields, no wheezes, no crackles.  Heart:  Regular rate and rhythm, S1, S2 normal, no murmurs heard  Abdomen: Soft, non tender, non distended with positive bowel sounds in all four quadrants. No " hepatosplenomegaly, ascites, or mass  Extremities: No edema  Skin: No rash  Neurologic: AAO x 3      Labs Reviewed:  Lab Results   Component Value Date    WBC 6.55 08/13/2020    HGB 13.0 08/13/2020    HCT 40.1 08/13/2020     08/13/2020    CRP 1.2 01/02/2019    CHOL 178 08/13/2020    TRIG 56 08/13/2020    HDL 71 08/13/2020    ALT 11 08/13/2020    AST 20 08/13/2020     08/13/2020    K 4.8 08/13/2020     08/13/2020    CREATININE 0.7 08/13/2020    BUN 13 08/13/2020    CO2 28 08/13/2020    HGBA1C 5.3 01/24/2017       Lab Results   Component Value Date    FERRITIN 120 01/04/2016    TIBC 324 04/09/2013          Imaging reviewed:  10/7/2020 CT Renal Stone Study  1. postoperative changes of the stomach and sigmoid colon with resected gallbladder.  2. No evidence of  stone tract calculus especially on the right.  3. No evidence of  side tract obstruction or collecting system dilatation.  4. No evidence of bowel wall thickening or obstruction    Endoscopy reviewed:  5/20/2019 Colonoscopy w/ Dr Abel for personal hx of colon cancer  1. Good prep, to cecum  2. Patent end-to-end colo-rectal anastomosis.   3. The examination was otherwise normal  4. Repeat in 3 years.    2/8/2018 Colonoscopy with Dr Abel for personal hx of colon cancer  1. Excellent prep, to cecum  2. Patent end-to-end colo-rectal anastomosis.   3.  Four 2 to 5 mm polyps in the ascending colon, removed with a cold snare. Resected and retrieved.   4. Two 5 to 7 mm polyps at the hepatic flexure,  removed with a cold snare. Resected and retrieved. Tattooed.     2/07/2017 Screening colonoscopy with dr Mar  1. Excellent prep, to cecum  2. Two 7 to 12 mm polyps in the proximal ascending colon, removed using injection-lift and a hot snare. Resected and retrieved. Injected.   3. One 4 mm polyp at 20 cm proximal to the anus, removed with a cold snare. Resected and retrieved.   4. One 15 mm polyp at 21 cm proximal to the anus, removed  using injection-lift and a hot snare and removed piecemeal using a hot snare. Incomplete resection. Resected tissue retrieved. Injected.   5. The examination was otherwise normal on direct and retroflexion views.  6. Biopsies revealed invasive adenocarcinoma.     55 y.o. female here for evaluation of:    Assessment:  1. Constipation, unspecified constipation type    2. Bloating    3. Lower abdominal pain      Pt w/ personal hx of adenocarcinoma of sigmoid colon, s/p sigmoidectomy with reanastomosis. C/o constipation symptoms. Prior to this acute episode, she only had 2 Bms per week. Has tried miralax with minimal results. She is getting  next week and concerned that she will experience acute constipation and pain again. We discussed using 1 dose of Mag citrate to clear her out and then starting on Linzess 72 mcg. We will titrate this according to her response. No red flags upon exam, not obstructive sounding. She is UTD with her screening colonoscopies. She is happy with this plan. I did discuss this with Dr Hernández and he agrees that no imaging or colonoscopy is required at this time.    Recommendations:  1. 1 bottle of mag citrate now to clean out  2. Start Linzess 72 mcg daily, instructions in AVS    F/U pending effects of Linzess      Order summary:  Orders Placed This Encounter    linaCLOtide (LINZESS) 72 mcg Cap capsule         Thank you so much for allowing me to participate in the care of Carmen Whitfield, [facility]

## 2021-04-06 ENCOUNTER — OFFICE VISIT (OUTPATIENT)
Dept: URGENT CARE | Facility: CLINIC | Age: 56
End: 2021-04-06
Payer: COMMERCIAL

## 2021-04-06 VITALS
OXYGEN SATURATION: 96 % | BODY MASS INDEX: 21.53 KG/M2 | HEART RATE: 84 BPM | DIASTOLIC BLOOD PRESSURE: 67 MMHG | SYSTOLIC BLOOD PRESSURE: 98 MMHG | RESPIRATION RATE: 16 BRPM | WEIGHT: 117 LBS | TEMPERATURE: 98 F | HEIGHT: 62 IN

## 2021-04-06 DIAGNOSIS — R19.7 DIARRHEA, UNSPECIFIED TYPE: ICD-10-CM

## 2021-04-06 DIAGNOSIS — R51.9 NONINTRACTABLE HEADACHE, UNSPECIFIED CHRONICITY PATTERN, UNSPECIFIED HEADACHE TYPE: Primary | ICD-10-CM

## 2021-04-06 LAB
CTP QC/QA: YES
SARS-COV-2 RDRP RESP QL NAA+PROBE: NEGATIVE

## 2021-04-06 PROCEDURE — 3008F PR BODY MASS INDEX (BMI) DOCUMENTED: ICD-10-PCS | Mod: CPTII,S$GLB,, | Performed by: FAMILY MEDICINE

## 2021-04-06 PROCEDURE — 3008F BODY MASS INDEX DOCD: CPT | Mod: CPTII,S$GLB,, | Performed by: FAMILY MEDICINE

## 2021-04-06 PROCEDURE — U0002 COVID-19 LAB TEST NON-CDC: HCPCS | Mod: QW,S$GLB,, | Performed by: FAMILY MEDICINE

## 2021-04-06 PROCEDURE — 99214 PR OFFICE/OUTPT VISIT, EST, LEVL IV, 30-39 MIN: ICD-10-PCS | Mod: S$GLB,,, | Performed by: FAMILY MEDICINE

## 2021-04-06 PROCEDURE — U0002: ICD-10-PCS | Mod: QW,S$GLB,, | Performed by: FAMILY MEDICINE

## 2021-04-06 PROCEDURE — 99214 OFFICE O/P EST MOD 30 MIN: CPT | Mod: S$GLB,,, | Performed by: FAMILY MEDICINE

## 2021-04-08 ENCOUNTER — CLINICAL SUPPORT (OUTPATIENT)
Dept: URGENT CARE | Facility: CLINIC | Age: 56
End: 2021-04-08
Payer: COMMERCIAL

## 2021-04-08 DIAGNOSIS — Z78.9 NO KNOWN HEALTH PROBLEMS: Primary | ICD-10-CM

## 2021-04-08 LAB
CTP QC/QA: YES
SARS-COV-2 RDRP RESP QL NAA+PROBE: NEGATIVE

## 2021-04-08 PROCEDURE — U0002: ICD-10-PCS | Mod: QW,S$GLB,, | Performed by: NURSE PRACTITIONER

## 2021-04-08 PROCEDURE — 99211 OFF/OP EST MAY X REQ PHY/QHP: CPT | Mod: S$GLB,,, | Performed by: FAMILY MEDICINE

## 2021-04-08 PROCEDURE — 99211 PR OFFICE/OUTPT VISIT, EST, LEVL I: ICD-10-PCS | Mod: S$GLB,,, | Performed by: FAMILY MEDICINE

## 2021-04-08 PROCEDURE — U0002 COVID-19 LAB TEST NON-CDC: HCPCS | Mod: QW,S$GLB,, | Performed by: NURSE PRACTITIONER

## 2021-07-21 ENCOUNTER — IMMUNIZATION (OUTPATIENT)
Dept: PRIMARY CARE CLINIC | Facility: CLINIC | Age: 56
End: 2021-07-21
Payer: COMMERCIAL

## 2021-07-21 DIAGNOSIS — Z23 NEED FOR VACCINATION: Primary | ICD-10-CM

## 2021-07-21 PROCEDURE — 91303 COVID-19,VECTOR-NR,RS-AD26,PF,0.5 ML DOSE VACCINE (JANSSEN): ICD-10-PCS | Mod: S$GLB,,, | Performed by: INTERNAL MEDICINE

## 2021-07-21 PROCEDURE — 0031A COVID-19,VECTOR-NR,RS-AD26,PF,0.5 ML DOSE VACCINE (JANSSEN): ICD-10-PCS | Mod: CV19,S$GLB,, | Performed by: INTERNAL MEDICINE

## 2021-07-21 PROCEDURE — 91303 COVID-19,VECTOR-NR,RS-AD26,PF,0.5 ML DOSE VACCINE (JANSSEN): CPT | Mod: S$GLB,,, | Performed by: INTERNAL MEDICINE

## 2021-07-21 PROCEDURE — 0031A COVID-19,VECTOR-NR,RS-AD26,PF,0.5 ML DOSE VACCINE (JANSSEN): CPT | Mod: CV19,S$GLB,, | Performed by: INTERNAL MEDICINE

## 2021-12-01 DIAGNOSIS — Z12.31 OTHER SCREENING MAMMOGRAM: ICD-10-CM

## 2022-01-10 ENCOUNTER — PATIENT MESSAGE (OUTPATIENT)
Dept: ADMINISTRATIVE | Facility: HOSPITAL | Age: 57
End: 2022-01-10
Payer: COMMERCIAL

## 2022-03-29 ENCOUNTER — OFFICE VISIT (OUTPATIENT)
Dept: FAMILY MEDICINE | Facility: CLINIC | Age: 57
End: 2022-03-29
Attending: FAMILY MEDICINE
Payer: COMMERCIAL

## 2022-03-29 VITALS
BODY MASS INDEX: 21.1 KG/M2 | WEIGHT: 114.63 LBS | SYSTOLIC BLOOD PRESSURE: 100 MMHG | OXYGEN SATURATION: 98 % | DIASTOLIC BLOOD PRESSURE: 74 MMHG | HEART RATE: 70 BPM | HEIGHT: 62 IN

## 2022-03-29 DIAGNOSIS — G47.00 INSOMNIA, UNSPECIFIED TYPE: ICD-10-CM

## 2022-03-29 DIAGNOSIS — Z00.00 ROUTINE GENERAL MEDICAL EXAMINATION AT HEALTH CARE FACILITY: Primary | ICD-10-CM

## 2022-03-29 DIAGNOSIS — F32.A DEPRESSION, UNSPECIFIED DEPRESSION TYPE: ICD-10-CM

## 2022-03-29 DIAGNOSIS — C18.7 MALIGNANT NEOPLASM OF SIGMOID COLON: ICD-10-CM

## 2022-03-29 DIAGNOSIS — Z78.0 ASYMPTOMATIC MENOPAUSAL STATE: ICD-10-CM

## 2022-03-29 DIAGNOSIS — F41.9 ANXIETY: ICD-10-CM

## 2022-03-29 DIAGNOSIS — Z12.11 SCREEN FOR COLON CANCER: ICD-10-CM

## 2022-03-29 DIAGNOSIS — G25.81 RESTLESS LEG SYNDROME: ICD-10-CM

## 2022-03-29 PROCEDURE — 99396 PREV VISIT EST AGE 40-64: CPT | Mod: S$GLB,,, | Performed by: FAMILY MEDICINE

## 2022-03-29 PROCEDURE — 3078F DIAST BP <80 MM HG: CPT | Mod: CPTII,S$GLB,, | Performed by: FAMILY MEDICINE

## 2022-03-29 PROCEDURE — 3008F PR BODY MASS INDEX (BMI) DOCUMENTED: ICD-10-PCS | Mod: CPTII,S$GLB,, | Performed by: FAMILY MEDICINE

## 2022-03-29 PROCEDURE — 99999 PR PBB SHADOW E&M-EST. PATIENT-LVL IV: CPT | Mod: PBBFAC,,, | Performed by: FAMILY MEDICINE

## 2022-03-29 PROCEDURE — 1160F RVW MEDS BY RX/DR IN RCRD: CPT | Mod: CPTII,S$GLB,, | Performed by: FAMILY MEDICINE

## 2022-03-29 PROCEDURE — 99396 PR PREVENTIVE VISIT,EST,40-64: ICD-10-PCS | Mod: S$GLB,,, | Performed by: FAMILY MEDICINE

## 2022-03-29 PROCEDURE — 1159F PR MEDICATION LIST DOCUMENTED IN MEDICAL RECORD: ICD-10-PCS | Mod: CPTII,S$GLB,, | Performed by: FAMILY MEDICINE

## 2022-03-29 PROCEDURE — 3074F SYST BP LT 130 MM HG: CPT | Mod: CPTII,S$GLB,, | Performed by: FAMILY MEDICINE

## 2022-03-29 PROCEDURE — 3008F BODY MASS INDEX DOCD: CPT | Mod: CPTII,S$GLB,, | Performed by: FAMILY MEDICINE

## 2022-03-29 PROCEDURE — 3074F PR MOST RECENT SYSTOLIC BLOOD PRESSURE < 130 MM HG: ICD-10-PCS | Mod: CPTII,S$GLB,, | Performed by: FAMILY MEDICINE

## 2022-03-29 PROCEDURE — 3078F PR MOST RECENT DIASTOLIC BLOOD PRESSURE < 80 MM HG: ICD-10-PCS | Mod: CPTII,S$GLB,, | Performed by: FAMILY MEDICINE

## 2022-03-29 PROCEDURE — 99999 PR PBB SHADOW E&M-EST. PATIENT-LVL IV: ICD-10-PCS | Mod: PBBFAC,,, | Performed by: FAMILY MEDICINE

## 2022-03-29 PROCEDURE — 1159F MED LIST DOCD IN RCRD: CPT | Mod: CPTII,S$GLB,, | Performed by: FAMILY MEDICINE

## 2022-03-29 PROCEDURE — 1160F PR REVIEW ALL MEDS BY PRESCRIBER/CLIN PHARMACIST DOCUMENTED: ICD-10-PCS | Mod: CPTII,S$GLB,, | Performed by: FAMILY MEDICINE

## 2022-03-29 RX ORDER — GABAPENTIN 300 MG/1
CAPSULE ORAL
Qty: 90 CAPSULE | Refills: 3 | Status: SHIPPED | OUTPATIENT
Start: 2022-03-29 | End: 2023-04-15 | Stop reason: SDUPTHER

## 2022-03-29 NOTE — PROGRESS NOTES
Subjective:       Patient ID: Carmen Gray is a 56 y.o. female.    Chief Complaint: Annual Exam, Medication Refill (Gabapentin), Other (Mammogram and BMD), and Referral (Psychiatry)    56 yr old pleasant white female with restless legs syndrome, insomnia, colon cA now in remission, presents today for her annual wellness check, lab work. She further suffers from fatigue, Insomnia and reports anxiety/depression and want psychiatry referral. RLS is better on neurontin. Details as follows -    History as below - reviewed     Medication Refill  Associated symptoms include fatigue. Pertinent negatives include no arthralgias, chest pain, congestion, diaphoresis, headaches, myalgias, nausea or sore throat.   Fatigue  This is a chronic problem. The current episode started more than 1 month ago. The problem occurs constantly. The problem has been unchanged. Associated symptoms include fatigue. Pertinent negatives include no arthralgias, chest pain, congestion, diaphoresis, headaches, myalgias, nausea or sore throat. Nothing aggravates the symptoms. She has tried nothing for the symptoms. The treatment provided no relief.     Review of Systems   Constitutional: Positive for fatigue. Negative for activity change, diaphoresis and unexpected weight change.   HENT: Negative.  Negative for nasal congestion, ear discharge, hearing loss, rhinorrhea, sore throat and voice change.    Eyes: Negative.  Negative for pain, discharge and visual disturbance.   Respiratory: Negative.  Negative for chest tightness, shortness of breath and wheezing.    Cardiovascular: Negative.  Negative for chest pain.   Gastrointestinal: Negative.  Negative for abdominal distention, anal bleeding, constipation and nausea.   Endocrine: Negative.  Negative for cold intolerance, polydipsia and polyuria.   Genitourinary: Negative.  Negative for decreased urine volume, difficulty urinating, dysuria, frequency, menstrual problem and vaginal pain.    Musculoskeletal: Negative.  Negative for arthralgias, gait problem and myalgias.   Integumentary:  Negative for color change, pallor and wound. Negative.   Allergic/Immunologic: Negative.  Negative for environmental allergies and immunocompromised state.   Neurological: Negative.  Negative for dizziness, tremors, seizures, speech difficulty and headaches.   Hematological: Negative.  Negative for adenopathy. Does not bruise/bleed easily.   Psychiatric/Behavioral: Positive for decreased concentration and dysphoric mood. Negative for agitation, confusion, hallucinations, self-injury and suicidal ideas. The patient is nervous/anxious.      PMH/PSH/FH/SH/MED/ALLERGY reviewed        Objective:       Vitals:    03/29/22 1319   BP: 100/74   Pulse: 70       Physical Exam  Constitutional:       General: She is not in acute distress.     Appearance: She is well-developed. She is not diaphoretic.   HENT:      Head: Normocephalic and atraumatic.      Right Ear: External ear normal.      Left Ear: External ear normal.      Nose: Nose normal.      Mouth/Throat:      Pharynx: No oropharyngeal exudate.   Eyes:      General: No scleral icterus.        Right eye: No discharge.         Left eye: No discharge.      Conjunctiva/sclera: Conjunctivae normal.      Pupils: Pupils are equal, round, and reactive to light.   Neck:      Thyroid: No thyromegaly.      Vascular: No JVD.      Trachea: No tracheal deviation.   Cardiovascular:      Rate and Rhythm: Normal rate and regular rhythm.      Heart sounds: Normal heart sounds. No murmur heard.    No friction rub. No gallop.   Pulmonary:      Effort: Pulmonary effort is normal.      Breath sounds: Normal breath sounds. No stridor. No wheezing or rales.   Chest:      Chest wall: No tenderness.   Abdominal:      General: Bowel sounds are normal. There is no distension.      Palpations: Abdomen is soft. There is no mass.      Tenderness: There is no abdominal tenderness. There is no guarding or  rebound.      Hernia: No hernia is present.   Musculoskeletal:         General: No tenderness. Normal range of motion.      Cervical back: Normal range of motion and neck supple.   Lymphadenopathy:      Cervical: No cervical adenopathy.   Skin:     General: Skin is warm and dry.      Coloration: Skin is not pale.      Findings: No erythema or rash.   Neurological:      Mental Status: She is alert and oriented to person, place, and time.      Cranial Nerves: No cranial nerve deficit.      Motor: No abnormal muscle tone.      Coordination: Coordination normal.      Deep Tendon Reflexes: Reflexes are normal and symmetric. Reflexes normal.   Psychiatric:         Behavior: Behavior normal.         Thought Content: Thought content normal.         Judgment: Judgment normal.         Assessment:       Problem List Items Addressed This Visit     Restless leg syndrome    Relevant Medications    gabapentin (NEURONTIN) 300 MG capsule    Malignant neoplasm of sigmoid colon    Relevant Orders    CBC Auto Differential    Comprehensive Metabolic Panel    Lipid Panel    Vitamin D    Insomnia    Relevant Orders    Ambulatory referral/consult to Psychiatry      Other Visit Diagnoses     Routine general medical examination at health care facility    -  Primary    Relevant Orders    CBC Auto Differential    Comprehensive Metabolic Panel    Lipid Panel    DXA Bone Density Spine And Hip    Vitamin D    Asymptomatic menopausal state        Relevant Orders    DXA Bone Density Spine And Hip    Screen for colon cancer        Relevant Orders    Case Request Endoscopy: COLONOSCOPY (Completed)    Anxiety        Relevant Orders    Ambulatory referral/consult to Psychiatry    Depression, unspecified depression type              Plan:           Carmen was seen today for annual exam, medication refill, other and referral.    Diagnoses and all orders for this visit:    Routine general medical examination at health care facility  -     CBC Auto  Differential; Future  -     Comprehensive Metabolic Panel; Future  -     Lipid Panel; Future  -     DXA Bone Density Spine And Hip; Future  -     Vitamin D; Future    Restless leg syndrome  -     gabapentin (NEURONTIN) 300 MG capsule; TAKE 1 CAPSULE BY MOUTH IN THE EVENING    Malignant neoplasm of sigmoid colon  -     CBC Auto Differential; Future  -     Comprehensive Metabolic Panel; Future  -     Lipid Panel; Future  -     Vitamin D; Future    Insomnia, unspecified type  -     Ambulatory referral/consult to Psychiatry; Future    Asymptomatic menopausal state  -     DXA Bone Density Spine And Hip; Future    Screen for colon cancer  -     Case Request Endoscopy: COLONOSCOPY    Anxiety  -     Ambulatory referral/consult to Psychiatry; Future    Depression, unspecified depression type        Wellness check  -normal exam  -labs    Anxiety/depression/insomnia  -refer psych    Colon CA  -now in remission    RLS  -controlled    Tobacco use  -trying to quit    Spent adequate time in obtaining history and explaining differentials      Follow up in about 1 year (around 3/29/2023), or if symptoms worsen or fail to improve.

## 2022-04-05 ENCOUNTER — HOSPITAL ENCOUNTER (OUTPATIENT)
Dept: RADIOLOGY | Facility: HOSPITAL | Age: 57
Discharge: HOME | End: 2022-04-05
Attending: FAMILY MEDICINE
Payer: COMMERCIAL

## 2022-04-05 DIAGNOSIS — Z12.31 OTHER SCREENING MAMMOGRAM: ICD-10-CM

## 2022-04-05 PROCEDURE — 77067 MAMMO DIGITAL SCREENING BILAT WITH TOMO: ICD-10-PCS | Mod: 26,,, | Performed by: RADIOLOGY

## 2022-04-05 PROCEDURE — 77063 BREAST TOMOSYNTHESIS BI: CPT | Mod: TC

## 2022-04-05 PROCEDURE — 77067 SCR MAMMO BI INCL CAD: CPT | Mod: 26,,, | Performed by: RADIOLOGY

## 2022-04-05 PROCEDURE — 77063 BREAST TOMOSYNTHESIS BI: CPT | Mod: 26,,, | Performed by: RADIOLOGY

## 2022-04-05 PROCEDURE — 77063 MAMMO DIGITAL SCREENING BILAT WITH TOMO: ICD-10-PCS | Mod: 26,,, | Performed by: RADIOLOGY

## 2022-04-05 PROCEDURE — 77067 SCR MAMMO BI INCL CAD: CPT | Mod: TC

## 2022-04-11 ENCOUNTER — HOSPITAL ENCOUNTER (OUTPATIENT)
Dept: RADIOLOGY | Facility: HOSPITAL | Age: 57
Discharge: HOME | End: 2022-04-11
Attending: FAMILY MEDICINE
Payer: COMMERCIAL

## 2022-04-11 DIAGNOSIS — Z78.0 ASYMPTOMATIC MENOPAUSAL STATE: ICD-10-CM

## 2022-04-11 DIAGNOSIS — Z00.00 ROUTINE GENERAL MEDICAL EXAMINATION AT HEALTH CARE FACILITY: ICD-10-CM

## 2022-04-11 PROCEDURE — 77080 DEXA BONE DENSITY SPINE HIP: ICD-10-PCS | Mod: 26,,, | Performed by: RADIOLOGY

## 2022-04-11 PROCEDURE — 77080 DXA BONE DENSITY AXIAL: CPT | Mod: TC

## 2022-04-11 PROCEDURE — 77080 DXA BONE DENSITY AXIAL: CPT | Mod: 26,,, | Performed by: RADIOLOGY

## 2022-04-12 ENCOUNTER — PATIENT MESSAGE (OUTPATIENT)
Dept: FAMILY MEDICINE | Facility: CLINIC | Age: 57
End: 2022-04-12
Payer: COMMERCIAL

## 2022-04-12 DIAGNOSIS — M81.6 LOCALIZED OSTEOPOROSIS WITHOUT CURRENT PATHOLOGICAL FRACTURE: Primary | ICD-10-CM

## 2022-04-13 ENCOUNTER — PATIENT MESSAGE (OUTPATIENT)
Dept: FAMILY MEDICINE | Facility: CLINIC | Age: 57
End: 2022-04-13
Payer: COMMERCIAL

## 2022-04-13 RX ORDER — ALENDRONATE SODIUM 70 MG/1
70 TABLET ORAL
Qty: 4 TABLET | Refills: 11 | Status: SHIPPED | OUTPATIENT
Start: 2022-04-13 | End: 2023-04-17

## 2022-05-12 ENCOUNTER — PATIENT MESSAGE (OUTPATIENT)
Dept: SMOKING CESSATION | Facility: CLINIC | Age: 57
End: 2022-05-12
Payer: COMMERCIAL

## 2022-05-23 ENCOUNTER — TELEPHONE (OUTPATIENT)
Dept: ENDOSCOPY | Facility: HOSPITAL | Age: 57
End: 2022-05-23
Payer: COMMERCIAL

## 2022-05-23 DIAGNOSIS — Z12.11 SCREENING FOR COLON CANCER: Primary | ICD-10-CM

## 2022-05-23 RX ORDER — POLYETHYLENE GLYCOL 3350, SODIUM SULFATE ANHYDROUS, SODIUM BICARBONATE, SODIUM CHLORIDE, POTASSIUM CHLORIDE 236; 22.74; 6.74; 5.86; 2.97 G/4L; G/4L; G/4L; G/4L; G/4L
4 POWDER, FOR SOLUTION ORAL ONCE
Qty: 4000 ML | Refills: 0 | Status: SHIPPED | OUTPATIENT
Start: 2022-05-23 | End: 2022-05-23

## 2022-07-21 ENCOUNTER — PATIENT MESSAGE (OUTPATIENT)
Dept: ENDOSCOPY | Facility: HOSPITAL | Age: 57
End: 2022-07-21
Payer: COMMERCIAL

## 2022-07-25 ENCOUNTER — HOSPITAL ENCOUNTER (OUTPATIENT)
Facility: HOSPITAL | Age: 57
Discharge: HOME | End: 2022-07-25
Attending: COLON & RECTAL SURGERY | Admitting: COLON & RECTAL SURGERY
Payer: COMMERCIAL

## 2022-07-25 ENCOUNTER — ANESTHESIA (OUTPATIENT)
Dept: ENDOSCOPY | Facility: HOSPITAL | Age: 57
End: 2022-07-25
Payer: COMMERCIAL

## 2022-07-25 ENCOUNTER — ANESTHESIA EVENT (OUTPATIENT)
Dept: ENDOSCOPY | Facility: HOSPITAL | Age: 57
End: 2022-07-25
Payer: COMMERCIAL

## 2022-07-25 VITALS
HEART RATE: 62 BPM | WEIGHT: 119 LBS | HEIGHT: 63 IN | DIASTOLIC BLOOD PRESSURE: 62 MMHG | RESPIRATION RATE: 17 BRPM | OXYGEN SATURATION: 98 % | BODY MASS INDEX: 21.09 KG/M2 | SYSTOLIC BLOOD PRESSURE: 106 MMHG | TEMPERATURE: 98 F

## 2022-07-25 DIAGNOSIS — Z85.038 HISTORY OF COLON CANCER: ICD-10-CM

## 2022-07-25 PROCEDURE — 45385 PR COLONOSCOPY,REMV LESN,SNARE: ICD-10-PCS | Mod: 33,,, | Performed by: COLON & RECTAL SURGERY

## 2022-07-25 PROCEDURE — 45385 COLONOSCOPY W/LESION REMOVAL: CPT | Mod: 33,,, | Performed by: COLON & RECTAL SURGERY

## 2022-07-25 PROCEDURE — 88305 TISSUE EXAM BY PATHOLOGIST: ICD-10-PCS | Mod: 26,,, | Performed by: PATHOLOGY

## 2022-07-25 PROCEDURE — 25000003 PHARM REV CODE 250: Performed by: COLON & RECTAL SURGERY

## 2022-07-25 PROCEDURE — 25000003 PHARM REV CODE 250: Performed by: NURSE ANESTHETIST, CERTIFIED REGISTERED

## 2022-07-25 PROCEDURE — 27201012 HC FORCEPS, HOT/COLD, DISP: Performed by: COLON & RECTAL SURGERY

## 2022-07-25 PROCEDURE — 27201028 HC NEEDLE, SCLERO: Performed by: COLON & RECTAL SURGERY

## 2022-07-25 PROCEDURE — E9220 PRA ENDO ANESTHESIA: HCPCS | Mod: 33,,, | Performed by: NURSE ANESTHETIST, CERTIFIED REGISTERED

## 2022-07-25 PROCEDURE — E9220 PRA ENDO ANESTHESIA: ICD-10-PCS | Mod: 33,,, | Performed by: NURSE ANESTHETIST, CERTIFIED REGISTERED

## 2022-07-25 PROCEDURE — 88305 TISSUE EXAM BY PATHOLOGIST: CPT | Performed by: PATHOLOGY

## 2022-07-25 PROCEDURE — 63600175 PHARM REV CODE 636 W HCPCS: Performed by: NURSE ANESTHETIST, CERTIFIED REGISTERED

## 2022-07-25 PROCEDURE — 45380 COLONOSCOPY AND BIOPSY: CPT | Mod: 59,,, | Performed by: COLON & RECTAL SURGERY

## 2022-07-25 PROCEDURE — 37000009 HC ANESTHESIA EA ADD 15 MINS: Performed by: COLON & RECTAL SURGERY

## 2022-07-25 PROCEDURE — 37000008 HC ANESTHESIA 1ST 15 MINUTES: Performed by: COLON & RECTAL SURGERY

## 2022-07-25 PROCEDURE — 45385 COLONOSCOPY W/LESION REMOVAL: CPT | Mod: PT | Performed by: COLON & RECTAL SURGERY

## 2022-07-25 PROCEDURE — 45380 PR COLONOSCOPY,BIOPSY: ICD-10-PCS | Mod: 59,,, | Performed by: COLON & RECTAL SURGERY

## 2022-07-25 PROCEDURE — 88305 TISSUE EXAM BY PATHOLOGIST: CPT | Mod: 26,,, | Performed by: PATHOLOGY

## 2022-07-25 PROCEDURE — 45380 COLONOSCOPY AND BIOPSY: CPT | Mod: PT,59 | Performed by: COLON & RECTAL SURGERY

## 2022-07-25 RX ORDER — LIDOCAINE HYDROCHLORIDE 20 MG/ML
INJECTION INTRAVENOUS
Status: DISCONTINUED | OUTPATIENT
Start: 2022-07-25 | End: 2022-07-25

## 2022-07-25 RX ORDER — SODIUM CHLORIDE 9 MG/ML
INJECTION, SOLUTION INTRAVENOUS CONTINUOUS
Status: DISCONTINUED | OUTPATIENT
Start: 2022-07-25 | End: 2022-07-25 | Stop reason: HOSPADM

## 2022-07-25 RX ORDER — PROPOFOL 10 MG/ML
VIAL (ML) INTRAVENOUS CONTINUOUS PRN
Status: DISCONTINUED | OUTPATIENT
Start: 2022-07-25 | End: 2022-07-25

## 2022-07-25 RX ORDER — PROPOFOL 10 MG/ML
VIAL (ML) INTRAVENOUS
Status: DISCONTINUED | OUTPATIENT
Start: 2022-07-25 | End: 2022-07-25

## 2022-07-25 RX ADMIN — LIDOCAINE HYDROCHLORIDE 100 MG: 20 INJECTION, SOLUTION INTRAVENOUS at 10:07

## 2022-07-25 RX ADMIN — SODIUM CHLORIDE: 0.9 INJECTION, SOLUTION INTRAVENOUS at 09:07

## 2022-07-25 RX ADMIN — Medication 200 MCG/KG/MIN: at 10:07

## 2022-07-25 RX ADMIN — PROPOFOL 50 MG: 10 INJECTION, EMULSION INTRAVENOUS at 10:07

## 2022-07-25 NOTE — ANESTHESIA POSTPROCEDURE EVALUATION
Anesthesia Post Evaluation    Patient: Brenda Fenner Schumert    Procedure(s) Performed: Procedure(s) (LRB):  COLONOSCOPY (N/A)    Final Anesthesia Type: general      Patient location during evaluation: GI PACU  Patient participation: Yes- Able to Participate  Level of consciousness: awake and alert, awake and oriented  Post-procedure vital signs: reviewed and stable  Pain management: adequate  Airway patency: patent    PONV status at discharge: No PONV  Anesthetic complications: no      Cardiovascular status: blood pressure returned to baseline, stable and hemodynamically stable  Respiratory status: unassisted, spontaneous ventilation and room air  Hydration status: euvolemic  Follow-up not needed.          Vitals Value Taken Time   /62 07/25/22 1131   Temp 36.6 °C (97.9 °F) 07/25/22 1100   Pulse 62 07/25/22 1131   Resp 17 07/25/22 1131   SpO2 98 % 07/25/22 1131         Event Time   Out of Recovery 11:34:55         Pain/Rafal Score: Rafal Score: 10 (7/25/2022 11:31 AM)

## 2022-07-25 NOTE — ANESTHESIA PREPROCEDURE EVALUATION
07/25/2022  Brenda Fenner Schumert is a 56 y.o., female.  Past Medical History:   Diagnosis Date    Anxiety     Colon cancer 2016    Depression     Insomnia     Restless leg syndrome      Past Surgical History:   Procedure Laterality Date    CHOLECYSTECTOMY      COLONOSCOPY N/A 2/7/2017    Procedure: COLONOSCOPY  split;  Surgeon: Dallas Mar MD;  Location: [facility] ENDO;  Service: Endoscopy;  Laterality: N/A;    COLONOSCOPY N/A 2/8/2018    Procedure: COLONOSCOPY;  Surgeon: Polo Abel MD;  [facility]  Service: Endoscopy;  Laterality: N/A;    COLONOSCOPY N/A 5/20/2019    Procedure: COLONOSCOPY;  Surgeon: Polo Abel MD;  Location: [facility] ([address] [place] [address]);  Service: Endoscopy;  Laterality: N/A;    gastric sleeve  2015    HERNIA REPAIR      hiatal     TUBAL LIGATION             Pre-op Assessment    I have reviewed the Patient Summary Reports.       I have reviewed the Medications.     Review of Systems  Anesthesia Hx:  Denies Family Hx of Anesthesia complications.   Denies Personal Hx of Anesthesia complications.   Hematology/Oncology:  Hematology Normal   Oncology Normal     EENT/Dental:EENT/Dental Normal   Cardiovascular:  Cardiovascular Normal     Pulmonary:  Pulmonary Normal    Renal/:  Renal/ Normal     Hepatic/GI:  Hepatic/GI Normal    Musculoskeletal:  Musculoskeletal Normal    Neurological:  Neurology Normal    Endocrine:  Endocrine Normal    Dermatological:  Skin Normal    Psych:  Psychiatric Normal           Physical Exam  General: Well nourished, Cooperative, Alert and Oriented    Airway:  Mallampati: II / I  Mouth Opening: Normal  Tongue: Normal  Neck ROM: Normal ROM    Dental:  Intact        Anesthesia Plan  Type of Anesthesia, risks & benefits discussed:    Anesthesia Type: Gen Natural Airway  Intra-op Monitoring Plan: Standard ASA  Monitors  Post Op Pain Control Plan: multimodal analgesia and IV/PO Opioids PRN  Induction:  IV  Informed Consent: Informed consent signed with the Patient and all parties understand the risks and agree with anesthesia plan.  All questions answered. Patient consented to blood products? No  ASA Score: 2    Ready For Surgery From Anesthesia Perspective.     .

## 2022-07-25 NOTE — TRANSFER OF CARE
"Anesthesia Transfer of Care Note    Patient: Brenda Fenner Schumert    Procedure(s) Performed: Procedure(s) (LRB):  COLONOSCOPY (N/A)    Patient location: PACU    Anesthesia Type: general    Transport from OR: Transported from OR on room air with adequate spontaneous ventilation    Post pain: adequate analgesia    Post assessment: no apparent anesthetic complications and tolerated procedure well    Post vital signs: stable    Level of consciousness: sedated and responds to stimulation    Nausea/Vomiting: no nausea/vomiting    Complications: none    Transfer of care protocol was followed      Last vitals:   Visit Vitals  /68   Pulse 71   Temp 36.6 °C (97.9 °F)   Resp 17   Ht 5' 2.5" (1.588 m)   Wt 54 kg (119 lb)   LMP  (LMP Unknown)   SpO2 99%   Breastfeeding No   BMI 21.42 kg/m²     "

## 2022-07-25 NOTE — PROVATION PATIENT INSTRUCTIONS
Discharge Summary/Instructions after an Endoscopic Procedure  Patient Name: Brenda Schumert  Patient MRN: [id number]  Patient YOB: 1965  Monday, July 25, 2022  Polo Abel MD  Dear patient,  As a result of recent federal legislation (The Federal Cures Act), you may   receive lab or pathology results from your procedure in your [facility]   account before your physician is able to contact you. Your physician or   their representative will relay the results to you with their   recommendations at their soonest availability.  Thank you,  RESTRICTIONS:  During your procedure today, you received medications for sedation.  These   medications may affect your judgment, balance and coordination.  Therefore,   for 24 hours, you have the following restrictions:   - DO NOT drive a car, operate machinery, make legal/financial decisions,   sign important papers or drink alcohol.    ACTIVITY:  Today: no heavy lifting, straining or running due to procedural   sedation/anesthesia.  The following day: return to full activity including work.  DIET:  Eat and drink normally unless instructed otherwise.     TREATMENT FOR COMMON SIDE EFFECTS:  - Mild abdominal pain, nausea, belching, bloating or excessive gas:  rest,   eat lightly and use a heating pad.  - Sore Throat: treat with throat lozenges and/or gargle with warm salt   water.  - Because air was used during the procedure, expelling large amounts of air   from your rectum or belching is normal.  - If a bowel prep was taken, you may not have a bowel movement for 1-3 days.    This is normal.  SYMPTOMS TO WATCH FOR AND REPORT TO YOUR PHYSICIAN:  1. Abdominal pain or bloating, other than gas cramps.  2. Chest pain.  3. Back pain.  4. Signs of infection such as: chills or fever occurring within 24 hours   after the procedure.  5. Rectal bleeding, which would show as bright red, maroon, or black stools.   (A tablespoon of blood from the rectum is not serious, especially if    hemorrhoids are present.)  6. Vomiting.  7. Weakness or dizziness.  GO DIRECTLY TO THE NEAREST EMERGENCY ROOM IF YOU HAVE ANY OF THE FOLLOWING:      Difficulty breathing              Chills and/or fever over 101 F   Persistent vomiting and/or vomiting blood   Severe abdominal pain   Severe chest pain   Black, tarry stools   Bleeding- more than one tablespoon   Any other symptom or condition that you feel may need urgent attention  Your doctor recommends these additional instructions:  If any biopsies were taken, your doctors clinic will contact you in 1 to 2   weeks with any results.  - Patient has a contact number available for emergencies.  The signs and   symptoms of potential delayed complications were discussed with the   patient.  Return to normal activities tomorrow.  Written discharge   instructions were provided to the patient.   - Discharge patient to home (ambulatory).   - Resume regular diet indefinitely.   - Continue present medications.   - Repeat colonoscopy in 3 years for surveillance.  For questions, problems or results please call your physician - Polo Abel MD at Work:  [phone].  [facility], EMERGENCY ROOM PHONE NUMBER: [phone]  IF A COMPLICATION OR EMERGENCY SITUATION ARISES AND YOU ARE UNABLE TO REACH   YOUR PHYSICIAN - GO DIRECTLY TO THE EMERGENCY ROOM.  Polo Abel MD  7/25/2022 10:59:48 AM  This report has been verified and signed electronically.  Dear patient,  As a result of recent federal legislation (The Federal Cures Act), you may   receive lab or pathology results from your procedure in your [facility]   account before your physician is able to contact you. Your physician or   their representative will relay the results to you with their   recommendations at their soonest availability.  Thank you,  PROVATION

## 2022-07-25 NOTE — H&P
"COLONOSCOPY HISTORY AND PHYSICAL EXAM    Procedure : Colonoscopy      INDICATIONS: personal history of colon cancer (sigmoid colon adenocarcinoma s/p sigmoid colectomy 2017)    Family Hx of CRC: Denies    Last Colonoscopy:  2019  Findings: There was evidence of a prior end-to-end colo-rectal anastomosis in the proximal rectum. This was patent. The anastomosis was traversed. The exam was otherwise without abnormality.       Past Medical History:   Diagnosis Date    Anxiety     Colon cancer 2016    Depression     Insomnia     Restless leg syndrome      Sedation Problems: NO  Family History   Problem Relation Age of Onset    Hypertension Mother     Hypertension Father     Hypertension Brother     Thyroid disease Unknown     Anesthesia problems Neg Hx      Fam Hx of Sedation Problems: NO  Social History     Socioeconomic History    Marital status:    Tobacco Use    Smoking status: Current Every Day Smoker     Packs/day: 0.50     Years: 30.00     Pack years: 15.00     Types: Cigarettes    Smokeless tobacco: Never Used   Substance and Sexual Activity    Alcohol use: Yes     Comment: social (history of heavy drinking around the time of her divorce)    Drug use: No       Review of Systems - Negative except   Respiratory ROS: no dyspnea  Cardiovascular ROS: no exertional chest pain  Gastrointestinal ROS: NO abdominal discomfort,  NO rectal bleeding  Musculoskeletal ROS: no muscular pain  Neurological ROS: no recent stroke    Physical Exam:  BP (!) 122/51 (BP Location: Left arm, Patient Position: Lying)   Pulse 71   Temp 97.7 °F (36.5 °C) (Temporal)   Resp 16   Ht 5' 2.5" (1.588 m)   Wt 54 kg (119 lb)   LMP  (LMP Unknown)   SpO2 100%   Breastfeeding No   BMI 21.42 kg/m²   General: no distress  Head: normocephalic  Mallampati Score   Neck: supple, symmetrical, trachea midline  Lungs:  clear to auscultation bilaterally and normal respiratory effort  Heart: regular rate and rhythm and no " murmur  Abdomen: soft, non-tender non-distented; bowel sounds normal; no masses,  no organomegaly  Extremities: no cyanosis or edema, or clubbing    ASA:  II    PLAN  COLONOSCOPY.    SedationPlan :MAC    The details of the procedure, the possible need for biopsy or polypectomy and the potential risks including bleeding, perforation, missed polyps were discussed in detail.

## 2022-08-02 LAB
FINAL PATHOLOGIC DIAGNOSIS: NORMAL
Lab: NORMAL

## 2022-10-05 ENCOUNTER — OFFICE VISIT (OUTPATIENT)
Dept: OBSTETRICS AND GYNECOLOGY | Facility: CLINIC | Age: 57
End: 2022-10-05
Payer: COMMERCIAL

## 2022-10-05 ENCOUNTER — LAB VISIT (OUTPATIENT)
Dept: LAB | Facility: HOSPITAL | Age: 57
End: 2022-10-05
Attending: FAMILY MEDICINE
Payer: COMMERCIAL

## 2022-10-05 VITALS
WEIGHT: 120.56 LBS | SYSTOLIC BLOOD PRESSURE: 134 MMHG | BODY MASS INDEX: 21.71 KG/M2 | DIASTOLIC BLOOD PRESSURE: 89 MMHG

## 2022-10-05 DIAGNOSIS — N89.8 VAGINAL DRYNESS: ICD-10-CM

## 2022-10-05 DIAGNOSIS — R53.83 FATIGUE, UNSPECIFIED TYPE: ICD-10-CM

## 2022-10-05 DIAGNOSIS — Z01.419 WELL WOMAN EXAM: Primary | ICD-10-CM

## 2022-10-05 DIAGNOSIS — N95.1 VASOMOTOR SYMPTOMS DUE TO MENOPAUSE: ICD-10-CM

## 2022-10-05 LAB — TSH SERPL DL<=0.005 MIU/L-ACNC: 1.17 UIU/ML (ref 0.4–4)

## 2022-10-05 PROCEDURE — 36415 COLL VENOUS BLD VENIPUNCTURE: CPT | Performed by: NURSE PRACTITIONER

## 2022-10-05 PROCEDURE — 84443 ASSAY THYROID STIM HORMONE: CPT | Performed by: NURSE PRACTITIONER

## 2022-10-05 PROCEDURE — 99999 PR PBB SHADOW E&M-EST. PATIENT-LVL III: CPT | Mod: PBBFAC,,, | Performed by: NURSE PRACTITIONER

## 2022-10-05 PROCEDURE — 3079F PR MOST RECENT DIASTOLIC BLOOD PRESSURE 80-89 MM HG: ICD-10-PCS | Mod: CPTII,S$GLB,, | Performed by: NURSE PRACTITIONER

## 2022-10-05 PROCEDURE — 99396 PR PREVENTIVE VISIT,EST,40-64: ICD-10-PCS | Mod: S$GLB,,, | Performed by: NURSE PRACTITIONER

## 2022-10-05 PROCEDURE — 1159F MED LIST DOCD IN RCRD: CPT | Mod: CPTII,S$GLB,, | Performed by: NURSE PRACTITIONER

## 2022-10-05 PROCEDURE — 1160F PR REVIEW ALL MEDS BY PRESCRIBER/CLIN PHARMACIST DOCUMENTED: ICD-10-PCS | Mod: CPTII,S$GLB,, | Performed by: NURSE PRACTITIONER

## 2022-10-05 PROCEDURE — 3075F PR MOST RECENT SYSTOLIC BLOOD PRESS GE 130-139MM HG: ICD-10-PCS | Mod: CPTII,S$GLB,, | Performed by: NURSE PRACTITIONER

## 2022-10-05 PROCEDURE — 3008F BODY MASS INDEX DOCD: CPT | Mod: CPTII,S$GLB,, | Performed by: NURSE PRACTITIONER

## 2022-10-05 PROCEDURE — 99999 PR PBB SHADOW E&M-EST. PATIENT-LVL III: ICD-10-PCS | Mod: PBBFAC,,, | Performed by: NURSE PRACTITIONER

## 2022-10-05 PROCEDURE — 3008F PR BODY MASS INDEX (BMI) DOCUMENTED: ICD-10-PCS | Mod: CPTII,S$GLB,, | Performed by: NURSE PRACTITIONER

## 2022-10-05 PROCEDURE — 3079F DIAST BP 80-89 MM HG: CPT | Mod: CPTII,S$GLB,, | Performed by: NURSE PRACTITIONER

## 2022-10-05 PROCEDURE — 3075F SYST BP GE 130 - 139MM HG: CPT | Mod: CPTII,S$GLB,, | Performed by: NURSE PRACTITIONER

## 2022-10-05 PROCEDURE — 1159F PR MEDICATION LIST DOCUMENTED IN MEDICAL RECORD: ICD-10-PCS | Mod: CPTII,S$GLB,, | Performed by: NURSE PRACTITIONER

## 2022-10-05 PROCEDURE — 81514 NFCT DS BV&VAGINITIS DNA ALG: CPT | Performed by: NURSE PRACTITIONER

## 2022-10-05 PROCEDURE — 1160F RVW MEDS BY RX/DR IN RCRD: CPT | Mod: CPTII,S$GLB,, | Performed by: NURSE PRACTITIONER

## 2022-10-05 PROCEDURE — 99396 PREV VISIT EST AGE 40-64: CPT | Mod: S$GLB,,, | Performed by: NURSE PRACTITIONER

## 2022-10-05 RX ORDER — ESTRADIOL 0.1 MG/G
1 CREAM VAGINAL DAILY
Qty: 42.5 G | Refills: 1 | Status: SHIPPED | OUTPATIENT
Start: 2022-10-05 | End: 2023-05-16

## 2022-10-05 NOTE — PROGRESS NOTES
CC: Annual    HPI: Pt is a 56 y.o. female who presents for routine annual exam.     She reports vaginal dryness and painful intercourse   Reports low libido and fatigue  Reports hot flashes starting in 40s but has improved  She would like to discuss hormone replacement    PAP: 2020= negative, HPV=negative  Mammogram: 2022  Colonoscopy: 2022  Dexa scan: 2022    Menstrual cycle: menopause  STD screening-declines  Exercise: no    Smoking history: current smoker not ready to quit  Wears seatbelts    Denies domestic violence     Past Medical History:   Diagnosis Date    Anxiety     Colon cancer 2016    Depression     Insomnia     Restless leg syndrome        Past Surgical History:   Procedure Laterality Date    CHOLECYSTECTOMY      COLONOSCOPY N/A 2017    Procedure: COLONOSCOPY  split;  Surgeon: Dallas Mar MD;  Location: [facility];  Service: Endoscopy;  Laterality: N/A;    COLONOSCOPY N/A 2018    Procedure: COLONOSCOPY;  Surgeon: Polo Abel MD;  Location: [address]);  Service: Endoscopy;  Laterality: N/A;    COLONOSCOPY N/A 2019    Procedure: COLONOSCOPY;  Surgeon: Polo Abel MD;  Location: [facility] ([address] [place] [address]);  Service: Endoscopy;  Laterality: N/A;    COLONOSCOPY N/A 2022    Procedure: COLONOSCOPY;  Surgeon: Polo Abel MD;  Location: [facility] ([address] [place] [address]);  Service: Endoscopy;  Laterality: N/A;  vaccinated-GT    gastric sleeve  2015    HERNIA REPAIR      hiatal     TUBAL LIGATION         OB History    Para Term  AB Living   1 1 1     1   SAB IAB Ectopic Multiple Live Births                  # Outcome Date GA Lbr Rajinder/2nd Weight Sex Delivery Anes PTL Lv   1 Term                Family History   Problem Relation Age of Onset    Hypertension Father     Hypertension Mother     Hypertension Brother     Thyroid disease Other     Anesthesia problems Neg Hx     Breast cancer Neg Hx     Ovarian cancer Neg Hx     Colon cancer Neg Hx         Social History     Socioeconomic History    Marital status:    Tobacco Use    Smoking status: Every Day     Packs/day: 0.50     Years: 30.00     Pack years: 15.00     Types: Cigarettes    Smokeless tobacco: Never   Substance and Sexual Activity    Alcohol use: Yes     Comment: social (history of heavy drinking around the time of her divorce)    Drug use: No    Sexual activity: Yes     Partners: Male     Comment:        /89   Wt 54.7 kg (120 lb 9.5 oz)   LMP  (LMP Unknown)   BMI 21.71 kg/m²       ROS:  GENERAL: Feeling well overall. Denies fever or chills.   SKIN: Denies rash or lesions.   HEAD: Denies head injury or headache.   NODES: Denies enlarged lymph nodes.   CHEST: Denies chest pain or shortness of breath.   CARDIOVASCULAR: Denies palpitations or left sided chest pain.   ABDOMEN: No abdominal pain, constipation, diarrhea, nausea, vomiting or rectal bleeding.   URINARY: No dysuria, hematuria, or burning on urination.  REPRODUCTIVE: See HPI.   BREASTS: Denies pain, lumps, or nipple discharge.   HEMATOLOGIC: No easy bruisability or excessive bleeding.   MUSCULOSKELETAL: Denies joint pain or swelling.   NEUROLOGIC: Denies syncope or weakness.   PSYCHIATRIC: Denies depression, anxiety or mood swings.    PE:   APPEARANCE: Well nourished, well developed, in no acute distress.  AFFECT: WNL, alert and oriented x 3  SKIN: No acne or hirsutism  NECK: Neck symmetric  NODES: No inguinal, cervical, axillary, or femoral lymph node enlargement  CHEST: Good respiratory effect  ABDOMEN: Soft.  No tenderness or masses. Nondistended.  BREASTS: Symmetrical, no skin changes or visible lesions.  No palpable masses, nipple discharge bilaterally.  PELVIC: Normal external genitalia without lesions.  Normal hair distribution.  Adequate perineal body, normal urethral meatus.  Vagina moist and well rugated without lesions or discharge.  Cervix pink, without lesions, discharge or tenderness.  No significant  cystocele or rectocele.  Bimanual exam shows uterus to be normal size, regular, mobile and nontender.  Adnexa without masses or tenderness.    Anus Perineum:No lesions, no relaxation, no external hemorrhoids.  EXTREMITIES: No edema.      ASSESSMENT AND PLAN  1. Well woman exam        2. Fatigue, unspecified type  TSH      3. Vasomotor symptoms due to menopause  estradiol-norethindrone (COMBIPATCH) 0.05-0.25 mg/24 hr      4. Vaginal dryness  estradioL (ESTRACE) 0.01 % (0.1 mg/gram) vaginal cream    Vaginosis Screen by DNA Probe        -A full discussion of the benefit-risk ratio of hormonal replacement therapy was carried out. Improvement in vasomotor and other climacteric symptoms is discussed, including possible improvements in sleep and mood. Reduction of risk for osteoporosis was explained. We discussed the study data showing increased risk of thrombo-embolic events such as myocardial infarction, stroke and also possibly breast cancer with estrogen replacement, and how this might affect her. The range of side effects such as breast tenderness, weight gain and including possible increases in lifetime risk of breast cancer and possible thrombotic complications was discussed. We also discussed ACOG's recommendation to use hormone replacement therapy for the relief of hot flashes alone and to be on the lowest dose possible for the shortest amount of time. Alternative such as herbal and soy-based products were reviewed. All of her questions about this therapy were answered.  -Discussed non-hormonal options including Paxil, Gabapentin (currently taking for sleep)  and Clonidine, which the patient declined    Patient was counseled today on A.C.S. Pap guidelines (due 2025) and recommendations for yearly pelvic exams, mammograms and monthly self breast exams; to see her PCP for other health maintenance.     All questions answered, patient verbalizes understanding.     Follow-up with in 1 year for routine exam and 3 months  for HRT follow up.

## 2022-10-07 LAB
BACTERIAL VAGINOSIS DNA: NEGATIVE
CANDIDA GLABRATA DNA: NEGATIVE
CANDIDA KRUSEI DNA: NEGATIVE
CANDIDA RRNA VAG QL PROBE: POSITIVE
T VAGINALIS RRNA GENITAL QL PROBE: NEGATIVE

## 2022-10-10 DIAGNOSIS — B37.31 CANDIDA VAGINITIS: Primary | ICD-10-CM

## 2022-10-10 RX ORDER — FLUCONAZOLE 150 MG/1
150 TABLET ORAL DAILY
Qty: 2 TABLET | Refills: 1 | Status: SHIPPED | OUTPATIENT
Start: 2022-10-10

## 2023-04-15 ENCOUNTER — PATIENT MESSAGE (OUTPATIENT)
Dept: FAMILY MEDICINE | Facility: CLINIC | Age: 58
End: 2023-04-15
Payer: COMMERCIAL

## 2023-04-15 DIAGNOSIS — G25.81 RESTLESS LEG SYNDROME: ICD-10-CM

## 2023-04-15 RX ORDER — GABAPENTIN 300 MG/1
CAPSULE ORAL
Qty: 90 CAPSULE | Refills: 3 | Status: SHIPPED | OUTPATIENT
Start: 2023-04-15 | End: 2023-06-23 | Stop reason: SDUPTHER

## 2023-04-15 NOTE — TELEPHONE ENCOUNTER
No new care gaps identified.  [facility] Embedded Care Gaps. Reference number: [id number]. 4/15/2023   10:56:54 AM BRITTNEYT

## 2023-04-19 DIAGNOSIS — Z12.31 OTHER SCREENING MAMMOGRAM: ICD-10-CM

## 2023-05-01 ENCOUNTER — PATIENT MESSAGE (OUTPATIENT)
Dept: ADMINISTRATIVE | Facility: HOSPITAL | Age: 58
End: 2023-05-01
Payer: COMMERCIAL

## 2023-05-16 ENCOUNTER — OFFICE VISIT (OUTPATIENT)
Dept: FAMILY MEDICINE | Facility: CLINIC | Age: 58
End: 2023-05-16
Attending: FAMILY MEDICINE
Payer: COMMERCIAL

## 2023-05-16 VITALS
DIASTOLIC BLOOD PRESSURE: 62 MMHG | BODY MASS INDEX: 20.66 KG/M2 | SYSTOLIC BLOOD PRESSURE: 102 MMHG | OXYGEN SATURATION: 98 % | HEIGHT: 63 IN | WEIGHT: 116.63 LBS | HEART RATE: 71 BPM

## 2023-05-16 DIAGNOSIS — G47.00 INSOMNIA, UNSPECIFIED TYPE: ICD-10-CM

## 2023-05-16 DIAGNOSIS — G25.81 RESTLESS LEG SYNDROME: ICD-10-CM

## 2023-05-16 DIAGNOSIS — M81.6 LOCALIZED OSTEOPOROSIS WITHOUT CURRENT PATHOLOGICAL FRACTURE: ICD-10-CM

## 2023-05-16 DIAGNOSIS — Z00.00 ROUTINE GENERAL MEDICAL EXAMINATION AT HEALTH CARE FACILITY: Primary | ICD-10-CM

## 2023-05-16 DIAGNOSIS — C18.7 MALIGNANT NEOPLASM OF SIGMOID COLON: ICD-10-CM

## 2023-05-16 PROCEDURE — 1160F PR REVIEW ALL MEDS BY PRESCRIBER/CLIN PHARMACIST DOCUMENTED: ICD-10-PCS | Mod: CPTII,S$GLB,, | Performed by: FAMILY MEDICINE

## 2023-05-16 PROCEDURE — 99396 PR PREVENTIVE VISIT,EST,40-64: ICD-10-PCS | Mod: S$GLB,,, | Performed by: FAMILY MEDICINE

## 2023-05-16 PROCEDURE — 3008F PR BODY MASS INDEX (BMI) DOCUMENTED: ICD-10-PCS | Mod: CPTII,S$GLB,, | Performed by: FAMILY MEDICINE

## 2023-05-16 PROCEDURE — 3074F PR MOST RECENT SYSTOLIC BLOOD PRESSURE < 130 MM HG: ICD-10-PCS | Mod: CPTII,S$GLB,, | Performed by: FAMILY MEDICINE

## 2023-05-16 PROCEDURE — 99999 PR PBB SHADOW E&M-EST. PATIENT-LVL III: ICD-10-PCS | Mod: PBBFAC,,, | Performed by: FAMILY MEDICINE

## 2023-05-16 PROCEDURE — 99396 PREV VISIT EST AGE 40-64: CPT | Mod: S$GLB,,, | Performed by: FAMILY MEDICINE

## 2023-05-16 PROCEDURE — 3074F SYST BP LT 130 MM HG: CPT | Mod: CPTII,S$GLB,, | Performed by: FAMILY MEDICINE

## 2023-05-16 PROCEDURE — 1159F MED LIST DOCD IN RCRD: CPT | Mod: CPTII,S$GLB,, | Performed by: FAMILY MEDICINE

## 2023-05-16 PROCEDURE — 99999 PR PBB SHADOW E&M-EST. PATIENT-LVL III: CPT | Mod: PBBFAC,,, | Performed by: FAMILY MEDICINE

## 2023-05-16 PROCEDURE — 3078F PR MOST RECENT DIASTOLIC BLOOD PRESSURE < 80 MM HG: ICD-10-PCS | Mod: CPTII,S$GLB,, | Performed by: FAMILY MEDICINE

## 2023-05-16 PROCEDURE — 1160F RVW MEDS BY RX/DR IN RCRD: CPT | Mod: CPTII,S$GLB,, | Performed by: FAMILY MEDICINE

## 2023-05-16 PROCEDURE — 3008F BODY MASS INDEX DOCD: CPT | Mod: CPTII,S$GLB,, | Performed by: FAMILY MEDICINE

## 2023-05-16 PROCEDURE — 1159F PR MEDICATION LIST DOCUMENTED IN MEDICAL RECORD: ICD-10-PCS | Mod: CPTII,S$GLB,, | Performed by: FAMILY MEDICINE

## 2023-05-16 PROCEDURE — 3078F DIAST BP <80 MM HG: CPT | Mod: CPTII,S$GLB,, | Performed by: FAMILY MEDICINE

## 2023-05-16 NOTE — PROGRESS NOTES
Subjective:       Patient ID: Brenda Fenner Schumert is a 57 y.o. female.    Chief Complaint: Annual Exam    56 yr old pleasant white female with osteoporosis,  restless legs syndrome, insomnia, colon cA now in remission, presents today for her annual wellness check, lab work.     Osteoporosis - on fosamax weekly - want prolia injections instead    History as below - reviewed     Medication Refill  Associated symptoms include fatigue. Pertinent negatives include no arthralgias, chest pain, congestion, diaphoresis, headaches, myalgias, nausea or sore throat.   Fatigue  This is a chronic problem. The current episode started more than 1 month ago. The problem occurs constantly. The problem has been unchanged. Associated symptoms include fatigue. Pertinent negatives include no arthralgias, chest pain, congestion, diaphoresis, headaches, myalgias, nausea or sore throat. Nothing aggravates the symptoms. She has tried nothing for the symptoms. The treatment provided no relief.   Review of Systems   Constitutional:  Positive for fatigue. Negative for activity change, diaphoresis and unexpected weight change.   HENT: Negative.  Negative for nasal congestion, ear discharge, hearing loss, rhinorrhea, sore throat and voice change.    Eyes: Negative.  Negative for pain, discharge and visual disturbance.   Respiratory: Negative.  Negative for chest tightness, shortness of breath and wheezing.    Cardiovascular: Negative.  Negative for chest pain.   Gastrointestinal: Negative.  Negative for abdominal distention, anal bleeding, constipation and nausea.   Endocrine: Negative.  Negative for cold intolerance, polydipsia and polyuria.   Genitourinary: Negative.  Negative for decreased urine volume, difficulty urinating, dysuria, frequency, menstrual problem and vaginal pain.   Musculoskeletal: Negative.  Negative for arthralgias, gait problem and myalgias.   Integumentary:  Negative for color change, pallor and wound. Negative.    Allergic/Immunologic: Negative.  Negative for environmental allergies and immunocompromised state.   Neurological: Negative.  Negative for dizziness, tremors, seizures, speech difficulty and headaches.   Hematological: Negative.  Negative for adenopathy. Does not bruise/bleed easily.   Psychiatric/Behavioral:  Positive for decreased concentration and dysphoric mood. Negative for agitation, confusion, hallucinations, self-injury and suicidal ideas. The patient is nervous/anxious.    PMH/PSH/FH/SH/MED/ALLERGY reviewed    Past Medical History:   Diagnosis Date    Anxiety     Colon cancer 2016    Depression     Insomnia     Restless leg syndrome        Past Surgical History:   Procedure Laterality Date    CHOLECYSTECTOMY      COLONOSCOPY N/A 2/7/2017    Procedure: COLONOSCOPY  split;  Surgeon: Dallas Mar MD;  Location: [facility];  Service: Endoscopy;  Laterality: N/A;    COLONOSCOPY N/A 2/8/2018    Procedure: COLONOSCOPY;  Surgeon: Polo Abel MD;  Location: [address]);  Service: Endoscopy;  Laterality: N/A;    COLONOSCOPY N/A 5/20/2019    Procedure: COLONOSCOPY;  Surgeon: Polo Abel MD;  Location: [facility]O ([address] [place] [address]);  Service: Endoscopy;  Laterality: N/A;    COLONOSCOPY N/A 7/25/2022    Procedure: COLONOSCOPY;  Surgeon: Polo Abel MD;  Location: [address]);  Service: Endoscopy;  Laterality: N/A;  vaccinated-GT    gastric sleeve  2015    HERNIA REPAIR      hiatal     TUBAL LIGATION         Family History   Problem Relation Age of Onset    Hypertension Father     Hypertension Mother     Hypertension Brother     Thyroid disease Other     Anesthesia problems Neg Hx     Breast cancer Neg Hx     Ovarian cancer Neg Hx     Colon cancer Neg Hx        Social History     Socioeconomic History    Marital status:    Tobacco Use    Smoking status: Every Day     Packs/day: 0.50     Years: 30.00     Pack years: 15.00     Types: Cigarettes     Passive exposure:  Never    Smokeless tobacco: Never   Substance and Sexual Activity    Alcohol use: Yes     Comment: social (history of heavy drinking around the time of her divorce)    Drug use: No    Sexual activity: Yes     Partners: Male     Comment:        Current Outpatient Medications   Medication Sig Dispense Refill    alendronate (FOSAMAX) 70 MG tablet TAKE 1 TABLET (70 MG TOTAL) BY MOUTH EVERY 7 DAYS 4 tablet 11    fluconazole (DIFLUCAN) 150 MG Tab Take 1 tablet (150 mg total) by mouth once daily. Repeat in 3 days if symptoms do not improve 2 tablet 1    gabapentin (NEURONTIN) 300 MG capsule TAKE 1 CAPSULE BY MOUTH IN THE EVENING 90 capsule 3    RESTASIS 0.05 % ophthalmic emulsion Place 1 drop into both eyes every evening.        No current facility-administered medications for this visit.       Review of patient's allergies indicates:   Allergen Reactions    Codeine Itching    Hydrocodone Itching             Objective:       Vitals:    05/16/23 1448   BP: 102/62   Pulse: 71       Physical Exam  Constitutional:       General: She is not in acute distress.     Appearance: She is well-developed. She is not diaphoretic.   HENT:      Head: Normocephalic and atraumatic.      Right Ear: External ear normal.      Left Ear: External ear normal.      Nose: Nose normal.      Mouth/Throat:      Pharynx: No oropharyngeal exudate.   Eyes:      General: No scleral icterus.        Right eye: No discharge.         Left eye: No discharge.      Conjunctiva/sclera: Conjunctivae normal.      Pupils: Pupils are equal, round, and reactive to light.   Neck:      Thyroid: No thyromegaly.      Vascular: No JVD.      Trachea: No tracheal deviation.   Cardiovascular:      Rate and Rhythm: Normal rate and regular rhythm.      Heart sounds: Normal heart sounds. No murmur heard.    No friction rub. No gallop.   Pulmonary:      Effort: Pulmonary effort is normal.      Breath sounds: Normal breath sounds. No stridor. No wheezing or rales.   Chest:       Chest wall: No tenderness.   Abdominal:      General: Bowel sounds are normal. There is no distension.      Palpations: Abdomen is soft. There is no mass.      Tenderness: There is no abdominal tenderness. There is no guarding or rebound.      Hernia: No hernia is present.   Musculoskeletal:         General: No tenderness. Normal range of motion.      Cervical back: Normal range of motion and neck supple.   Lymphadenopathy:      Cervical: No cervical adenopathy.   Skin:     General: Skin is warm and dry.      Coloration: Skin is not pale.      Findings: No erythema or rash.   Neurological:      Mental Status: She is alert and oriented to person, place, and time.      Cranial Nerves: No cranial nerve deficit.      Motor: No abnormal muscle tone.      Coordination: Coordination normal.      Deep Tendon Reflexes: Reflexes are normal and symmetric. Reflexes normal.   Psychiatric:         Behavior: Behavior normal.         Thought Content: Thought content normal.         Judgment: Judgment normal.       Assessment:       Problem List Items Addressed This Visit       Restless leg syndrome    Relevant Orders    CBC Auto Differential    Comprehensive Metabolic Panel    Lipid Panel    Malignant neoplasm of sigmoid colon    Relevant Orders    CBC Auto Differential    Comprehensive Metabolic Panel    Lipid Panel    Localized osteoporosis without current pathological fracture    Relevant Orders    CBC Auto Differential    Comprehensive Metabolic Panel    Lipid Panel    Insomnia    Relevant Orders    CBC Auto Differential    Comprehensive Metabolic Panel    Lipid Panel     Other Visit Diagnoses       Routine general medical examination at health care facility    -  Primary    Relevant Orders    CBC Auto Differential    Comprehensive Metabolic Panel    Lipid Panel            Plan:           Carmen was seen today for annual exam.    Diagnoses and all orders for this visit:    Routine general medical examination at [facility]  facility  -     CBC Auto Differential; Future  -     Comprehensive Metabolic Panel; Future  -     Lipid Panel; Future    Insomnia, unspecified type  -     CBC Auto Differential; Future  -     Comprehensive Metabolic Panel; Future  -     Lipid Panel; Future    Malignant neoplasm of sigmoid colon  -     CBC Auto Differential; Future  -     Comprehensive Metabolic Panel; Future  -     Lipid Panel; Future    Restless leg syndrome  -     CBC Auto Differential; Future  -     Comprehensive Metabolic Panel; Future  -     Lipid Panel; Future    Localized osteoporosis without current pathological fracture  -     CBC Auto Differential; Future  -     Comprehensive Metabolic Panel; Future  -     Lipid Panel; Future    Other orders  -     denosumab (PROLIA) injection 60 mg        Wellness check  -normal exam  -labs    Osteoporosis  -prolia 6 monthly    Anxiety/depression/insomnia  -improving  -home remnedies    Colon CA  -now in remission    RLS  -controlled    Tobacco use  -trying to quit    Spent adequate time in obtaining history and explaining differentials      Follow up in about 1 year (around 5/16/2024), or if symptoms worsen or fail to improve.

## 2023-05-17 ENCOUNTER — TELEPHONE (OUTPATIENT)
Dept: INFUSION THERAPY | Facility: HOSPITAL | Age: 58
End: 2023-05-17
Payer: COMMERCIAL

## 2023-05-18 ENCOUNTER — TELEPHONE (OUTPATIENT)
Dept: INFUSION THERAPY | Facility: HOSPITAL | Age: 58
End: 2023-05-18
Payer: COMMERCIAL

## 2023-05-24 ENCOUNTER — PATIENT MESSAGE (OUTPATIENT)
Dept: FAMILY MEDICINE | Facility: CLINIC | Age: 58
End: 2023-05-24
Payer: COMMERCIAL

## 2023-05-29 ENCOUNTER — HOSPITAL ENCOUNTER (OUTPATIENT)
Dept: RADIOLOGY | Facility: HOSPITAL | Age: 58
Discharge: HOME | End: 2023-05-29
Attending: FAMILY MEDICINE
Payer: COMMERCIAL

## 2023-05-29 DIAGNOSIS — Z12.31 OTHER SCREENING MAMMOGRAM: ICD-10-CM

## 2023-05-29 NOTE — TELEPHONE ENCOUNTER
----- Message from Charleen Ramiro sent at 4/9/2019  8:19 AM CDT -----  Contact: Self- [phone]  Page- pt called to determine if it was time to have another c-scope- found cancer 2 years ago and had it removed- followed up last year- wanted to know if it was time this year- please contact pt at [phone]   DISPLAY PLAN FREE TEXT

## 2023-05-31 DIAGNOSIS — Z12.31 OTHER SCREENING MAMMOGRAM: ICD-10-CM

## 2023-06-05 ENCOUNTER — HOSPITAL ENCOUNTER (OUTPATIENT)
Dept: RADIOLOGY | Facility: HOSPITAL | Age: 58
Discharge: HOME | End: 2023-06-05
Attending: FAMILY MEDICINE
Payer: COMMERCIAL

## 2023-06-05 PROCEDURE — 77063 BREAST TOMOSYNTHESIS BI: CPT | Mod: 26,,, | Performed by: RADIOLOGY

## 2023-06-05 PROCEDURE — 77063 MAMMO DIGITAL SCREENING BILAT WITH TOMO: ICD-10-PCS | Mod: 26,,, | Performed by: RADIOLOGY

## 2023-06-05 PROCEDURE — 77067 MAMMO DIGITAL SCREENING BILAT WITH TOMO: ICD-10-PCS | Mod: 26,,, | Performed by: RADIOLOGY

## 2023-06-05 PROCEDURE — 77067 SCR MAMMO BI INCL CAD: CPT | Mod: TC

## 2023-06-05 PROCEDURE — 77067 SCR MAMMO BI INCL CAD: CPT | Mod: 26,,, | Performed by: RADIOLOGY

## 2023-06-07 ENCOUNTER — PATIENT MESSAGE (OUTPATIENT)
Dept: ADMINISTRATIVE | Facility: HOSPITAL | Age: 58
End: 2023-06-07
Payer: COMMERCIAL

## 2023-06-23 DIAGNOSIS — G25.81 RESTLESS LEG SYNDROME: ICD-10-CM

## 2023-06-23 NOTE — TELEPHONE ENCOUNTER
No care due was identified.  [facility] Embedded Care Due Messages. Reference number: [id number].   6/23/2023 5:24:40 PM CDT

## 2023-06-24 RX ORDER — GABAPENTIN 300 MG/1
CAPSULE ORAL
Qty: 90 CAPSULE | Refills: 3 | Status: SHIPPED | OUTPATIENT
Start: 2023-06-24

## 2024-06-03 ENCOUNTER — OFFICE VISIT (OUTPATIENT)
Dept: FAMILY MEDICINE | Facility: CLINIC | Age: 59
End: 2024-06-03
Attending: FAMILY MEDICINE
Payer: COMMERCIAL

## 2024-06-03 VITALS
SYSTOLIC BLOOD PRESSURE: 124 MMHG | DIASTOLIC BLOOD PRESSURE: 78 MMHG | WEIGHT: 123.25 LBS | BODY MASS INDEX: 21.84 KG/M2 | HEIGHT: 63 IN | HEART RATE: 76 BPM | OXYGEN SATURATION: 100 %

## 2024-06-03 DIAGNOSIS — Z00.00 ROUTINE GENERAL MEDICAL EXAMINATION AT HEALTH CARE FACILITY: Primary | ICD-10-CM

## 2024-06-03 DIAGNOSIS — Z12.31 ENCOUNTER FOR SCREENING MAMMOGRAM FOR BREAST CANCER: ICD-10-CM

## 2024-06-03 DIAGNOSIS — G25.81 RESTLESS LEG SYNDROME: ICD-10-CM

## 2024-06-03 DIAGNOSIS — M81.6 LOCALIZED OSTEOPOROSIS WITHOUT CURRENT PATHOLOGICAL FRACTURE: ICD-10-CM

## 2024-06-03 DIAGNOSIS — R13.10 DYSPHAGIA, UNSPECIFIED TYPE: ICD-10-CM

## 2024-06-03 DIAGNOSIS — C18.7 MALIGNANT NEOPLASM OF SIGMOID COLON: ICD-10-CM

## 2024-06-03 DIAGNOSIS — G47.00 INSOMNIA, UNSPECIFIED TYPE: ICD-10-CM

## 2024-06-03 PROCEDURE — 3008F BODY MASS INDEX DOCD: CPT | Mod: CPTII,S$GLB,, | Performed by: FAMILY MEDICINE

## 2024-06-03 PROCEDURE — 3074F SYST BP LT 130 MM HG: CPT | Mod: CPTII,S$GLB,, | Performed by: FAMILY MEDICINE

## 2024-06-03 PROCEDURE — 99999 PR PBB SHADOW E&M-EST. PATIENT-LVL IV: CPT | Mod: PBBFAC,,, | Performed by: FAMILY MEDICINE

## 2024-06-03 PROCEDURE — 3078F DIAST BP <80 MM HG: CPT | Mod: CPTII,S$GLB,, | Performed by: FAMILY MEDICINE

## 2024-06-03 PROCEDURE — 99214 OFFICE O/P EST MOD 30 MIN: CPT | Mod: S$GLB,,, | Performed by: FAMILY MEDICINE

## 2024-06-03 PROCEDURE — 1160F RVW MEDS BY RX/DR IN RCRD: CPT | Mod: CPTII,S$GLB,, | Performed by: FAMILY MEDICINE

## 2024-06-03 PROCEDURE — 1159F MED LIST DOCD IN RCRD: CPT | Mod: CPTII,S$GLB,, | Performed by: FAMILY MEDICINE

## 2024-06-03 RX ORDER — GABAPENTIN 300 MG/1
CAPSULE ORAL
Qty: 90 CAPSULE | Refills: 3 | Status: SHIPPED | OUTPATIENT
Start: 2024-06-03

## 2024-06-03 NOTE — PROGRESS NOTES
Subjective:       Patient ID: Brenda Fenner Schumert is a 58 y.o. female.    Chief Complaint: Annual Exam and Possible Hernia    56 yr old pleasant white female with osteoporosis,  restless legs syndrome, insomnia, colon cA now in remission, presents today for her annual wellness check, lab work. C/o nausea and dysphagia to solids x 2-3 months. No hematochezia, hematemesis.    Osteoporosis - on fosamax weekly - want prolia injections instead    History as below - reviewed     Medication Refill  Associated symptoms include abdominal pain, fatigue and vomiting. Pertinent negatives include no arthralgias, chest pain, congestion, diaphoresis, headaches, joint swelling, myalgias, nausea, neck pain, sore throat or weakness.   Fatigue  This is a chronic problem. The current episode started more than 1 month ago. The problem occurs constantly. The problem has been unchanged. Associated symptoms include abdominal pain, fatigue and vomiting. Pertinent negatives include no arthralgias, chest pain, congestion, diaphoresis, headaches, joint swelling, myalgias, nausea, neck pain, sore throat or weakness. Nothing aggravates the symptoms. She has tried nothing for the symptoms. The treatment provided no relief.     Review of Systems   Constitutional:  Positive for fatigue. Negative for activity change, diaphoresis and unexpected weight change.   HENT: Negative.  Negative for nasal congestion, ear discharge, hearing loss, rhinorrhea, sore throat, trouble swallowing and voice change.    Eyes: Negative.  Negative for pain, discharge and visual disturbance.   Respiratory: Negative.  Negative for chest tightness, shortness of breath and wheezing.    Cardiovascular: Negative.  Negative for chest pain and palpitations.   Gastrointestinal:  Positive for abdominal pain and vomiting. Negative for abdominal distention, anal bleeding, blood in stool, constipation, diarrhea and nausea.        Dysphagia   Endocrine: Negative.  Negative for cold  intolerance, polydipsia and polyuria.   Genitourinary: Negative.  Negative for decreased urine volume, difficulty urinating, dysuria, frequency, hematuria, menstrual problem and vaginal pain.   Musculoskeletal: Negative.  Negative for arthralgias, gait problem, joint swelling, myalgias and neck pain.   Integumentary:  Negative for color change, pallor and wound. Negative.   Allergic/Immunologic: Negative.  Negative for environmental allergies and immunocompromised state.   Neurological: Negative.  Negative for dizziness, tremors, seizures, speech difficulty, weakness and headaches.   Hematological: Negative.  Negative for adenopathy. Does not bruise/bleed easily.   Psychiatric/Behavioral:  Positive for decreased concentration. Negative for agitation, confusion, dysphoric mood, hallucinations, self-injury and suicidal ideas. The patient is nervous/anxious.      PMH/PSH/FH/SH/MED/ALLERGY reviewed    Past Medical History:   Diagnosis Date    Anxiety     Colon cancer 2016    Depression     Insomnia     Restless leg syndrome        Past Surgical History:   Procedure Laterality Date    CHOLECYSTECTOMY      COLONOSCOPY N/A 2/7/2017    Procedure: COLONOSCOPY  split;  Surgeon: Dallas Mar MD;  Location: [facility];  Service: Endoscopy;  Laterality: N/A;    COLONOSCOPY N/A 2/8/2018    Procedure: COLONOSCOPY;  Surgeon: Polo Abel MD;  Location: [address]);  Service: Endoscopy;  Laterality: N/A;    COLONOSCOPY N/A 5/20/2019    Procedure: COLONOSCOPY;  Surgeon: Polo Abel MD;  Location: [address]);  Service: Endoscopy;  Laterality: N/A;    COLONOSCOPY N/A 7/25/2022    Procedure: COLONOSCOPY;  Surgeon: Polo Abel MD;  Location: [address]);  Service: Endoscopy;  Laterality: N/A;  vaccinated-GT    gastric sleeve  2015    HERNIA REPAIR      hiatal     TUBAL LIGATION         Family History   Problem Relation Name Age of Onset    Hypertension Father      Hypertension  Mother      Hypertension Brother      Thyroid disease Other      Anesthesia problems Neg Hx      Breast cancer Neg Hx      Ovarian cancer Neg Hx      Colon cancer Neg Hx         Social History     Socioeconomic History    Marital status:    Tobacco Use    Smoking status: Every Day     Current packs/day: 0.50     Average packs/day: 0.5 packs/day for 30.0 years (15.0 ttl pk-yrs)     Types: Cigarettes     Passive exposure: Never    Smokeless tobacco: Never   Substance and Sexual Activity    Alcohol use: Yes     Comment: social (history of heavy drinking around the time of her divorce)    Drug use: No    Sexual activity: Yes     Partners: Male     Comment:      Social Determinants of Health     Financial Resource Strain: Medium Risk (6/2/2024)    Overall Financial Resource Strain (CARDIA)     Difficulty of Paying Living Expenses: Somewhat hard   Food Insecurity: No Food Insecurity (6/2/2024)    Hunger Vital Sign     Worried About Running Out of Food in the Last Year: Never true     Ran Out of Food in the Last Year: Never true   Physical Activity: Unknown (6/2/2024)    Exercise Vital Sign     Days of Exercise per Week: 5 days   Stress: Stress Concern Present (6/2/2024)    [employer] [place] [employer] - Occupational Stress Questionnaire     Feeling of Stress : Very much   Housing Stability: Unknown (6/2/2024)    Housing Stability Vital Sign     Unable to Pay for Housing in the Last Year: No       Current Outpatient Medications   Medication Sig Dispense Refill    fluconazole (DIFLUCAN) 150 MG Tab Take 1 tablet (150 mg total) by mouth once daily. Repeat in 3 days if symptoms do not improve 2 tablet 1    gabapentin (NEURONTIN) 300 MG capsule TAKE 1 CAPSULE BY MOUTH IN THE EVENING 90 capsule 3     No current facility-administered medications for this visit.       Review of patient's allergies indicates:   Allergen Reactions    Codeine Itching    Hydrocodone Itching             Objective:        Vitals:    06/03/24 1500   BP: 124/78   Pulse: 76       Physical Exam  Constitutional:       General: She is not in acute distress.     Appearance: She is well-developed. She is not diaphoretic.   HENT:      Head: Normocephalic and atraumatic.      Right Ear: External ear normal.      Left Ear: External ear normal.      Nose: Nose normal.      Mouth/Throat:      Pharynx: No oropharyngeal exudate.   Eyes:      General: No scleral icterus.        Right eye: No discharge.         Left eye: No discharge.      Conjunctiva/sclera: Conjunctivae normal.      Pupils: Pupils are equal, round, and reactive to light.   Neck:      Thyroid: No thyromegaly.      Vascular: No JVD.      Trachea: No tracheal deviation.   Cardiovascular:      Rate and Rhythm: Normal rate and regular rhythm.      Heart sounds: Normal heart sounds. No murmur heard.     No friction rub. No gallop.   Pulmonary:      Effort: Pulmonary effort is normal.      Breath sounds: Normal breath sounds. No stridor. No wheezing or rales.   Chest:      Chest wall: No tenderness.   Abdominal:      General: Bowel sounds are normal. There is no distension.      Palpations: Abdomen is soft. There is no mass.      Tenderness: There is no abdominal tenderness. There is no guarding or rebound.      Hernia: No hernia is present.   Musculoskeletal:         General: No tenderness. Normal range of motion.      Cervical back: Normal range of motion and neck supple.   Lymphadenopathy:      Cervical: No cervical adenopathy.   Skin:     General: Skin is warm and dry.      Coloration: Skin is not pale.      Findings: No erythema or rash.   Neurological:      Mental Status: She is alert and oriented to person, place, and time.      Cranial Nerves: No cranial nerve deficit.      Motor: No abnormal muscle tone.      Coordination: Coordination normal.      Deep Tendon Reflexes: Reflexes are normal and symmetric. Reflexes normal.   Psychiatric:         Behavior: Behavior normal.          Thought Content: Thought content normal.         Judgment: Judgment normal.         Assessment:       Problem List Items Addressed This Visit       Restless leg syndrome    Relevant Medications    gabapentin (NEURONTIN) 300 MG capsule    Other Relevant Orders    CBC Auto Differential    Comprehensive Metabolic Panel    Lipid Panel    TSH    Malignant neoplasm of sigmoid colon    Relevant Orders    CBC Auto Differential    Comprehensive Metabolic Panel    Lipid Panel    TSH    Localized osteoporosis without current pathological fracture    Relevant Orders    CBC Auto Differential    Comprehensive Metabolic Panel    Lipid Panel    TSH    Insomnia    Relevant Orders    CBC Auto Differential    Comprehensive Metabolic Panel    Lipid Panel    TSH     Other Visit Diagnoses       Routine general medical examination at health care facility    -  Primary    Relevant Orders    CBC Auto Differential    Comprehensive Metabolic Panel    Lipid Panel    TSH    Encounter for screening mammogram for breast cancer        Relevant Orders    CBC Auto Differential    Comprehensive Metabolic Panel    Lipid Panel    TSH    Dysphagia, unspecified type        Relevant Orders    Ambulatory referral/consult to Gastroenterology            Plan:           Carmen was seen today for annual exam and possible hernia.    Diagnoses and all orders for this visit:    Routine general medical examination at [facility] care facility  -     CBC Auto Differential; Future  -     Comprehensive Metabolic Panel; Future  -     Lipid Panel; Future  -     TSH; Future    Insomnia, unspecified type  -     CBC Auto Differential; Future  -     Comprehensive Metabolic Panel; Future  -     Lipid Panel; Future  -     TSH; Future    Malignant neoplasm of sigmoid colon  -     CBC Auto Differential; Future  -     Comprehensive Metabolic Panel; Future  -     Lipid Panel; Future  -     TSH; Future    Restless leg syndrome  -     CBC Auto Differential; Future  -     Comprehensive  Metabolic Panel; Future  -     Lipid Panel; Future  -     TSH; Future  -     gabapentin (NEURONTIN) 300 MG capsule; TAKE 1 CAPSULE BY MOUTH IN THE EVENING    Localized osteoporosis without current pathological fracture  -     CBC Auto Differential; Future  -     Comprehensive Metabolic Panel; Future  -     Lipid Panel; Future  -     TSH; Future    Encounter for screening mammogram for breast cancer  -     CBC Auto Differential; Future  -     Comprehensive Metabolic Panel; Future  -     Lipid Panel; Future  -     TSH; Future    Dysphagia, unspecified type  -     Ambulatory referral/consult to Gastroenterology; Future        Wellness check  -normal exam  -labs    Osteoporosis  -prolia 6 monthly    Anxiety/depression/insomnia  -improving  -home remnedies    Colon CA  -now in remission    RLS  -controlled    Tobacco use  -trying to quit    Spent adequate time in obtaining history and explaining differentials      Rtc 3 m r prn

## 2024-06-07 ENCOUNTER — OFFICE VISIT (OUTPATIENT)
Dept: GASTROENTEROLOGY | Facility: CLINIC | Age: 59
End: 2024-06-07
Attending: FAMILY MEDICINE
Payer: COMMERCIAL

## 2024-06-07 ENCOUNTER — HOSPITAL ENCOUNTER (OUTPATIENT)
Dept: RADIOLOGY | Facility: HOSPITAL | Age: 59
Discharge: HOME | End: 2024-06-07
Attending: FAMILY MEDICINE
Payer: COMMERCIAL

## 2024-06-07 VITALS
SYSTOLIC BLOOD PRESSURE: 122 MMHG | BODY MASS INDEX: 21.79 KG/M2 | DIASTOLIC BLOOD PRESSURE: 79 MMHG | WEIGHT: 123 LBS | HEART RATE: 59 BPM | HEIGHT: 63 IN

## 2024-06-07 DIAGNOSIS — R13.10 DYSPHAGIA, UNSPECIFIED TYPE: ICD-10-CM

## 2024-06-07 DIAGNOSIS — Z12.31 OTHER SCREENING MAMMOGRAM: ICD-10-CM

## 2024-06-07 DIAGNOSIS — K21.9 GASTROESOPHAGEAL REFLUX DISEASE, UNSPECIFIED WHETHER ESOPHAGITIS PRESENT: Primary | ICD-10-CM

## 2024-06-07 DIAGNOSIS — R10.13 EPIGASTRIC PAIN: ICD-10-CM

## 2024-06-07 DIAGNOSIS — K59.09 CHRONIC CONSTIPATION: ICD-10-CM

## 2024-06-07 PROCEDURE — 99204 OFFICE O/P NEW MOD 45 MIN: CPT | Mod: S$GLB,,, | Performed by: NURSE PRACTITIONER

## 2024-06-07 PROCEDURE — 77067 SCR MAMMO BI INCL CAD: CPT | Mod: TC

## 2024-06-07 PROCEDURE — 3008F BODY MASS INDEX DOCD: CPT | Mod: CPTII,S$GLB,, | Performed by: NURSE PRACTITIONER

## 2024-06-07 PROCEDURE — 99999 PR PBB SHADOW E&M-EST. PATIENT-LVL IV: CPT | Mod: PBBFAC,,, | Performed by: NURSE PRACTITIONER

## 2024-06-07 PROCEDURE — 3074F SYST BP LT 130 MM HG: CPT | Mod: CPTII,S$GLB,, | Performed by: NURSE PRACTITIONER

## 2024-06-07 PROCEDURE — 77067 SCR MAMMO BI INCL CAD: CPT | Mod: 26,,, | Performed by: RADIOLOGY

## 2024-06-07 PROCEDURE — 1160F RVW MEDS BY RX/DR IN RCRD: CPT | Mod: CPTII,S$GLB,, | Performed by: NURSE PRACTITIONER

## 2024-06-07 PROCEDURE — 77063 BREAST TOMOSYNTHESIS BI: CPT | Mod: TC

## 2024-06-07 PROCEDURE — 3078F DIAST BP <80 MM HG: CPT | Mod: CPTII,S$GLB,, | Performed by: NURSE PRACTITIONER

## 2024-06-07 PROCEDURE — 1159F MED LIST DOCD IN RCRD: CPT | Mod: CPTII,S$GLB,, | Performed by: NURSE PRACTITIONER

## 2024-06-07 PROCEDURE — 77063 BREAST TOMOSYNTHESIS BI: CPT | Mod: 26,,, | Performed by: RADIOLOGY

## 2024-06-07 RX ORDER — PANTOPRAZOLE SODIUM 40 MG/1
40 TABLET, DELAYED RELEASE ORAL DAILY
Qty: 30 TABLET | Refills: 2 | Status: SHIPPED | OUTPATIENT
Start: 2024-06-07 | End: 2025-06-07

## 2024-06-07 NOTE — PROGRESS NOTES
Subjective:       Patient ID: Brenda Fenner Schumert is a 58 y.o. female.    Chief Complaint: Gastroesophageal Reflux and Dysphagia    57 y/o female with hx of gastric sleeve with hiatal hernia repair (2015) and colon cancer s/p sigmoidectomy 3/2017 referred by PCP for GERD and dysphagia. Patient reports burning epigastric pain after most meals. Food feels stuck in her chest and has associated regurgitation. Has tried OTC antacids without relief. She is constipated. Has BM every 2-3 days. States stool is hard to pass. Takes Miralax as needed with some relief. Previously prescribed Linzess daily but stopped medication as symptoms improved. Denies hematochezia or melena.         Past Medical History:   Diagnosis Date    Anxiety     Colon cancer 2016    Depression     Insomnia     Restless leg syndrome        Past Surgical History:   Procedure Laterality Date    CHOLECYSTECTOMY      COLONOSCOPY N/A 2/7/2017    Procedure: COLONOSCOPY  split;  Surgeon: Dallas Mar MD;  Location: [facility];  Service: Endoscopy;  Laterality: N/A;    COLONOSCOPY N/A 2/8/2018    Procedure: COLONOSCOPY;  Surgeon: Polo Abel MD;  Location: [address]);  Service: Endoscopy;  Laterality: N/A;    COLONOSCOPY N/A 5/20/2019    Procedure: COLONOSCOPY;  Surgeon: Polo Abel MD;  Location: [facility] ([address] [place] [address]);  Service: Endoscopy;  Laterality: N/A;    COLONOSCOPY N/A 7/25/2022    Procedure: COLONOSCOPY;  Surgeon: Polo Abel MD;  Location: [address]);  Service: Endoscopy;  Laterality: N/A;  vaccinated-GT    gastric sleeve  2015    HERNIA REPAIR      hiatal     TUBAL LIGATION         Family History   Problem Relation Name Age of Onset    Hypertension Father      Hypertension Mother      Hypertension Brother      Thyroid disease Other      Anesthesia problems Neg Hx      Breast cancer Neg Hx      Ovarian cancer Neg Hx      Colon cancer Neg Hx         Social History     Socioeconomic History     "Marital status:    Tobacco Use    Smoking status: Every Day     Current packs/day: 0.50     Average packs/day: 0.5 packs/day for 30.0 years (15.0 ttl pk-yrs)     Types: Cigarettes     Passive exposure: Never    Smokeless tobacco: Never   Substance and Sexual Activity    Alcohol use: Yes     Comment: social (history of heavy drinking around the time of her divorce)    Drug use: No    Sexual activity: Yes     Partners: Male     Comment:      Social Determinants of Health     Financial Resource Strain: Medium Risk (6/2/2024)    Overall Financial Resource Strain (CARDIA)     Difficulty of Paying Living Expenses: Somewhat hard   Food Insecurity: No Food Insecurity (6/2/2024)    Hunger Vital Sign     Worried About Running Out of Food in the Last Year: Never true     Ran Out of Food in the Last Year: Never true   Physical Activity: Unknown (6/2/2024)    Exercise Vital Sign     Days of Exercise per Week: 5 days   Stress: Stress Concern Present (6/2/2024)    [employer] [place] [employer] - Occupational Stress Questionnaire     Feeling of Stress : Very much   Housing Stability: Unknown (6/2/2024)    Housing Stability Vital Sign     Unable to Pay for Housing in the Last Year: No       Review of Systems   Constitutional:  Negative for appetite change and unexpected weight change.   HENT:  Positive for trouble swallowing.    Respiratory:  Negative for shortness of breath.    Cardiovascular:  Negative for chest pain.   Gastrointestinal:  Positive for abdominal pain and constipation.   Hematological:  Negative for adenopathy. Does not bruise/bleed easily.   Psychiatric/Behavioral:  Negative for dysphoric mood.          Objective:     Vitals:    06/07/24 1333   BP: 122/79   BP Location: Left arm   Patient Position: Sitting   BP Method: Medium (Automatic)   Pulse: (!) 59   Weight: 55.8 kg (123 lb 0.3 oz)   Height: 5' 2.5" (1.588 m)          Physical Exam  Constitutional:       General: She is not in acute " distress.     Appearance: Normal appearance.   HENT:      Head: Normocephalic.   Eyes:      Conjunctiva/sclera: Conjunctivae normal.   Pulmonary:      Effort: Pulmonary effort is normal. No respiratory distress.   Musculoskeletal:         General: Normal range of motion.      Cervical back: Normal range of motion.   Skin:     General: Skin is warm and dry.   Neurological:      Mental Status: She is alert and oriented to person, place, and time.   Psychiatric:         Mood and Affect: Mood normal.         Behavior: Behavior normal.               Assessment:         ICD-10-CM ICD-9-CM   1. Gastroesophageal reflux disease, unspecified whether esophagitis present  K21.9 530.81   2. Dysphagia, unspecified type  R13.10 787.20   3. Epigastric pain  R10.13 789.06   4. Chronic constipation  K59.09 564.00       Plan:       Gastroesophageal reflux disease, unspecified whether esophagitis present; Dysphagia, unspecified type; Epigastric pain  -     FL Upper GI With Small Bowel (xpd); Future; Expected date: 06/07/2024  -     pantoprazole (PROTONIX) 40 MG tablet; Take 1 tablet (40 mg total) by mouth once daily.  Dispense: 30 tablet; Refill: 2    Chronic constipation  -     X-Ray Abdomen Flat And Erect; Future; Expected date: 06/07/2024  -     lubiprostone (AMITIZA) 8 MCG Cap; Take 1 capsule (8 mcg total) by mouth 2 (two) times daily.  Dispense: 180 capsule; Refill: 2      Follow up if symptoms worsen or fail to improve.     Patient's Medications   New Prescriptions    LUBIPROSTONE (AMITIZA) 8 MCG CAP    Take 1 capsule (8 mcg total) by mouth 2 (two) times daily.    PANTOPRAZOLE (PROTONIX) 40 MG TABLET    Take 1 tablet (40 mg total) by mouth once daily.   Previous Medications    FLUCONAZOLE (DIFLUCAN) 150 MG TAB    Take 1 tablet (150 mg total) by mouth once daily. Repeat in 3 days if symptoms do not improve    GABAPENTIN (NEURONTIN) 300 MG CAPSULE    TAKE 1 CAPSULE BY MOUTH IN THE EVENING   Modified Medications    No medications on  file   Discontinued Medications    No medications on file

## 2024-06-10 PROBLEM — Z12.11 SCREENING FOR COLON CANCER: Status: RESOLVED | Noted: 2019-05-20 | Resolved: 2024-06-10

## 2024-06-10 RX ORDER — LUBIPROSTONE 8 UG/1
8 CAPSULE ORAL 2 TIMES DAILY
Qty: 180 CAPSULE | Refills: 2 | Status: SHIPPED | OUTPATIENT
Start: 2024-06-10

## 2024-06-11 ENCOUNTER — HOSPITAL ENCOUNTER (OUTPATIENT)
Dept: RADIOLOGY | Facility: HOSPITAL | Age: 59
Discharge: HOME | End: 2024-06-11
Attending: NURSE PRACTITIONER
Payer: COMMERCIAL

## 2024-06-11 ENCOUNTER — PATIENT MESSAGE (OUTPATIENT)
Dept: GASTROENTEROLOGY | Facility: CLINIC | Age: 59
End: 2024-06-11
Payer: COMMERCIAL

## 2024-06-11 DIAGNOSIS — K21.9 GASTROESOPHAGEAL REFLUX DISEASE, UNSPECIFIED WHETHER ESOPHAGITIS PRESENT: ICD-10-CM

## 2024-06-11 DIAGNOSIS — R13.10 DYSPHAGIA, UNSPECIFIED TYPE: ICD-10-CM

## 2024-06-11 DIAGNOSIS — R10.13 EPIGASTRIC PAIN: ICD-10-CM

## 2024-06-11 PROCEDURE — 74248 X-RAY SM INT F-THRU STD: CPT | Mod: TC

## 2024-06-11 PROCEDURE — A9698 NON-RAD CONTRAST MATERIALNOC: HCPCS | Performed by: NURSE PRACTITIONER

## 2024-06-11 PROCEDURE — 74240 X-RAY XM UPR GI TRC 1CNTRST: CPT | Mod: 26,,, | Performed by: RADIOLOGY

## 2024-06-11 PROCEDURE — 74248 X-RAY SM INT F-THRU STD: CPT | Mod: 26,,, | Performed by: RADIOLOGY

## 2024-06-11 PROCEDURE — 25500020 PHARM REV CODE 255: Performed by: NURSE PRACTITIONER

## 2024-06-11 RX ADMIN — BARIUM SULFATE 137 ML: 980 POWDER, FOR SUSPENSION ORAL at 09:06

## 2024-06-11 RX ADMIN — BARIUM SULFATE 355 ML: 0.6 SUSPENSION ORAL at 09:06

## 2024-06-11 NOTE — TELEPHONE ENCOUNTER
Informed patient via phone that the new medication is for her chronic constipation and the pantoprazole is for her acid reflux and trouble swallowing. Also scheduled patient for a follow up visit 07/17/2024.

## 2024-07-17 ENCOUNTER — OFFICE VISIT (OUTPATIENT)
Dept: GASTROENTEROLOGY | Facility: CLINIC | Age: 59
End: 2024-07-17
Payer: COMMERCIAL

## 2024-07-17 DIAGNOSIS — K21.9 GASTROESOPHAGEAL REFLUX DISEASE, UNSPECIFIED WHETHER ESOPHAGITIS PRESENT: Primary | ICD-10-CM

## 2024-07-17 DIAGNOSIS — K59.09 CHRONIC CONSTIPATION: ICD-10-CM

## 2024-07-17 DIAGNOSIS — K22.4 ESOPHAGEAL DYSMOTILITY: ICD-10-CM

## 2024-07-17 PROCEDURE — 99214 OFFICE O/P EST MOD 30 MIN: CPT | Mod: 95,,, | Performed by: NURSE PRACTITIONER

## 2024-07-17 PROCEDURE — 1160F RVW MEDS BY RX/DR IN RCRD: CPT | Mod: CPTII,95,, | Performed by: NURSE PRACTITIONER

## 2024-07-17 PROCEDURE — 1159F MED LIST DOCD IN RCRD: CPT | Mod: CPTII,95,, | Performed by: NURSE PRACTITIONER

## 2024-07-17 NOTE — PATIENT INSTRUCTIONS
EGD Prep Instructions    [facility] [address] [place]  [address]    You are scheduled for an EGD with TBA on TBA at [facility].  You will enter through the [facility] entrance and check in at Same Day Surgery.    Nothing to eat or drink after midnight before the procedure.  You MAY brush your teeth.    You MAY take your blood pressure, heart, and seizure medication on the morning of the procedure, with a SIP of water.  Hold ALL other medications until after the procedure.    You must have someone with you to DRIVE YOU HOME since you will be receiving IV sedation for the procedure.    If you are on blood thinners THAT YOU HAVE BEEN INSTRUCTED TO HOLD BY YOUR DOCTOR FOR THIS PROCEDURE, then do NOT take this the morning of your EGD.  Do NOT stop these medications on your own, they must be approved to be held by your doctor.  Your EGD can NOT be done if you are on these medications.  Examples of blood thinners include: Coumadin, Aggrenox, Plavix, Pradaxa, Reapro, Pletal, Xarelto, Ticagrelor, Brilinta, Eliquis, and high dose aspirin (325 mg).  You do not have to stop baby aspirin 81 mg.    You will receive a call the afternoon before your EGD to tell you the time to arrive.  If you have not received a call by the day before your procedure, call the Pre-op Coordinator at [phone].

## 2024-07-17 NOTE — PROGRESS NOTES
Subjective:       Patient ID: Brenda Fenner Schumert is a 58 y.o. female.    Chief Complaint: Follow-up    The patient location is: [place], [place]  The chief complaint leading to consultation is: GERD and constipation    Visit type: audiovisual    Face to Face time with patient: 20 minutes  45 minutes of total time spent on the encounter, which includes face to face time and non-face to face time preparing to see the patient (eg, review of tests), Obtaining and/or reviewing separately obtained history, Documenting clinical information in the electronic or other health record, Independently interpreting results (not separately reported) and communicating results to the patient/family/caregiver, or Care coordination (not separately reported).         Each patient to whom he or she provides medical services by telemedicine is:  (1) informed of the relationship between the physician and patient and the respective role of any other health care provider with respect to management of the patient; and (2) notified that he or she may decline to receive medical services by telemedicine and may withdraw from such care at any time.    Notes:     57 y/o female with hx of gastric sleeve with hiatal hernia repair (2015) and colon cancer s/p sigmoidectomy 3/2017 presents for virtual follow up. Initially seen last month for for GERD, dysphagia, and constipation. Today she reports significant improvement in upper GI symptoms. She is able to swallow without difficulty and GERD controlled with daily pantoprazole. Appetite has increased and she has gained a few pounds. Recurrent regurgitation if she misses PPI dose for 1 or 2 days. UGI revealed mild esophageal dysmotility. Will schedule follow up EGD. Bowel habits have not changed since starting Amitiza. Has BM every 2-3 days.            Past Medical History:   Diagnosis Date    Anxiety     Colon cancer 2016    Depression     Insomnia     Restless leg syndrome        Past Surgical  History:   Procedure Laterality Date    CHOLECYSTECTOMY      COLONOSCOPY N/A 2/7/2017    Procedure: COLONOSCOPY  split;  Surgeon: Dallas Mar MD;  Location: [facility] ENDO;  Service: Endoscopy;  Laterality: N/A;    COLONOSCOPY N/A 2/8/2018    Procedure: COLONOSCOPY;  Surgeon: Polo Abel MD;  Location: [facility] ENDO (4TH FLR);  Service: Endoscopy;  Laterality: N/A;    COLONOSCOPY N/A 5/20/2019    Procedure: COLONOSCOPY;  Surgeon: Polo Abel MD;  Location: [facility] ENDO (4TH FLR);  Service: Endoscopy;  Laterality: N/A;    COLONOSCOPY N/A 7/25/2022    Procedure: COLONOSCOPY;  Surgeon: Polo Abel MD;  Location: [facility] ENDO ([facility] FLR);  Service: Endoscopy;  Laterality: N/A;  vaccinated-GT    gastric sleeve  2015    HERNIA REPAIR      hiatal     TUBAL LIGATION         Family History   Problem Relation Name Age of Onset    Hypertension Father      Hypertension Mother      Hypertension Brother      Thyroid disease Other      Anesthesia problems Neg Hx      Breast cancer Neg Hx      Ovarian cancer Neg Hx      Colon cancer Neg Hx         Social History     Socioeconomic History    Marital status:    Tobacco Use    Smoking status: Every Day     Current packs/day: 0.50     Average packs/day: 0.5 packs/day for 30.0 years (15.0 ttl pk-yrs)     Types: Cigarettes     Passive exposure: Never    Smokeless tobacco: Never   Substance and Sexual Activity    Alcohol use: Yes     Comment: social (history of heavy drinking around the time of her divorce)    Drug use: No    Sexual activity: Yes     Partners: Male     Comment:      Social Determinants of Health     Financial Resource Strain: Medium Risk (6/2/2024)    Overall Financial Resource Strain (CARDIA)     Difficulty of Paying Living Expenses: Somewhat hard   Food Insecurity: No Food Insecurity (6/2/2024)    Hunger Vital Sign     Worried About Running Out of Food in the Last Year: Never true     Ran Out of Food in the Last Year: Never true    Physical Activity: Unknown (6/2/2024)    Exercise Vital Sign     Days of Exercise per Week: 5 days   Stress: Stress Concern Present (6/2/2024)    [employer] [place] [employer] - Occupational Stress Questionnaire     Feeling of Stress : Very much   Housing Stability: Unknown (6/2/2024)    Housing Stability Vital Sign     Unable to Pay for Housing in the Last Year: No       Review of Systems   Constitutional:  Negative for appetite change and unexpected weight change.   HENT:  Negative for trouble swallowing.    Respiratory:  Negative for shortness of breath.    Cardiovascular:  Negative for chest pain.   Gastrointestinal:  Positive for constipation. Negative for abdominal pain.   Hematological:  Negative for adenopathy. Does not bruise/bleed easily.   Psychiatric/Behavioral:  Negative for dysphoric mood.          Objective:     There were no vitals filed for this visit.       Physical Exam  Constitutional:       General: She is not in acute distress.     Appearance: Normal appearance. She is not ill-appearing.   HENT:      Head: Normocephalic.   Eyes:      Conjunctiva/sclera: Conjunctivae normal.   Pulmonary:      Effort: Pulmonary effort is normal. No respiratory distress.   Skin:     Coloration: Skin is not jaundiced or pale.   Neurological:      Mental Status: She is alert and oriented to person, place, and time.   Psychiatric:         Mood and Affect: Mood normal.         Behavior: Behavior normal.               Assessment:         ICD-10-CM ICD-9-CM   1. Gastroesophageal reflux disease, unspecified whether esophagitis present  K21.9 530.81   2. Esophageal dysmotility  K22.4 530.5   3. Chronic constipation  K59.09 564.00       Plan:       Gastroesophageal reflux disease, unspecified whether esophagitis present; Esophageal dysmotility  -     Continue pantoprazole daily  -     Case Request Endoscopy: EGD (ESOPHAGOGASTRODUODENOSCOPY)  -      Patient will contact office to schedule EGD once  transportation arranged    Chronic constipation  -     Continue Amitiza bid  -     Add Miralax daily and increase fiber intake    Follow up if symptoms worsen or fail to improve.     Patient's Medications   New Prescriptions    No medications on file   Previous Medications    FLUCONAZOLE (DIFLUCAN) 150 MG TAB    Take 1 tablet (150 mg total) by mouth once daily. Repeat in 3 days if symptoms do not improve    GABAPENTIN (NEURONTIN) 300 MG CAPSULE    TAKE 1 CAPSULE BY MOUTH IN THE EVENING    LUBIPROSTONE (AMITIZA) 8 MCG CAP    Take 1 capsule (8 mcg total) by mouth 2 (two) times daily.    PANTOPRAZOLE (PROTONIX) 40 MG TABLET    Take 1 tablet (40 mg total) by mouth once daily.   Modified Medications    No medications on file   Discontinued Medications    No medications on file

## 2024-07-31 ENCOUNTER — PATIENT MESSAGE (OUTPATIENT)
Dept: GASTROENTEROLOGY | Facility: CLINIC | Age: 59
End: 2024-07-31
Payer: COMMERCIAL

## 2024-07-31 DIAGNOSIS — K21.9 GASTROESOPHAGEAL REFLUX DISEASE, UNSPECIFIED WHETHER ESOPHAGITIS PRESENT: ICD-10-CM

## 2024-07-31 DIAGNOSIS — K59.09 CHRONIC CONSTIPATION: Primary | ICD-10-CM

## 2024-08-02 RX ORDER — OMEPRAZOLE 40 MG/1
40 CAPSULE, DELAYED RELEASE ORAL DAILY
Qty: 30 CAPSULE | Refills: 2 | Status: SHIPPED | OUTPATIENT
Start: 2024-08-02 | End: 2025-08-02

## 2024-08-28 ENCOUNTER — OFFICE VISIT (OUTPATIENT)
Dept: GASTROENTEROLOGY | Facility: CLINIC | Age: 59
End: 2024-08-28
Payer: COMMERCIAL

## 2024-08-28 DIAGNOSIS — K21.9 GASTROESOPHAGEAL REFLUX DISEASE, UNSPECIFIED WHETHER ESOPHAGITIS PRESENT: Primary | ICD-10-CM

## 2024-08-28 DIAGNOSIS — K59.09 CHRONIC CONSTIPATION: ICD-10-CM

## 2024-08-28 PROCEDURE — 99214 OFFICE O/P EST MOD 30 MIN: CPT | Mod: 95,,, | Performed by: NURSE PRACTITIONER

## 2024-08-28 PROCEDURE — 1159F MED LIST DOCD IN RCRD: CPT | Mod: CPTII,95,, | Performed by: NURSE PRACTITIONER

## 2024-08-28 PROCEDURE — 1160F RVW MEDS BY RX/DR IN RCRD: CPT | Mod: CPTII,95,, | Performed by: NURSE PRACTITIONER

## 2024-08-28 NOTE — PROGRESS NOTES
Subjective:       Patient ID: Brenda Fenner Schumert is a 58 y.o. female.    Chief Complaint: Follow-up    The patient location is: [place], [place]  The chief complaint leading to consultation is: GERD and constipation     Visit type: audiovisual     Face to Face time with patient: 20 minutes  45 minutes of total time spent on the encounter, which includes face to face time and non-face to face time preparing to see the patient (eg, review of tests), Obtaining and/or reviewing separately obtained history, Documenting clinical information in the electronic or other health record, Independently interpreting results (not separately reported) and communicating results to the patient/family/caregiver, or Care coordination (not separately reported).            Each patient to whom he or she provides medical services by telemedicine is:  (1) informed of the relationship between the physician and patient and the respective role of any other health care provider with respect to management of the patient; and (2) notified that he or she may decline to receive medical services by telemedicine and may withdraw from such care at any time.     Notes:      59 y/o female with hx of gastric sleeve with hiatal hernia repair (2015) and colon cancer s/p sigmoidectomy 3/2017, GERD and chronic constipation presents for virtual follow up. Patient sent msg to office last month reporting increased bloating, gas, and constipation which she attributed to pantoprazole. PPI therapy was changed to omeprazole. Today she is feeling well. GERD controlled with daily omeprazole. Occasional heartburn after eating spicy food or if she misses a dose of PPI. No longer constipated. Taking Linzess 145 mcg daily.          Past Medical History:   Diagnosis Date    Anxiety     Colon cancer 2016    Depression     Insomnia     Restless leg syndrome        Past Surgical History:   Procedure Laterality Date    CHOLECYSTECTOMY      COLONOSCOPY N/A 2/7/2017     Procedure: COLONOSCOPY  split;  Surgeon: Dallas Mar MD;  Location: [facility] ENDO;  Service: Endoscopy;  Laterality: N/A;    COLONOSCOPY N/A 2/8/2018    Procedure: COLONOSCOPY;  Surgeon: Polo Abel MD;  Location: [facility] ENDO (4TH FLR);  Service: Endoscopy;  Laterality: N/A;    COLONOSCOPY N/A 5/20/2019    Procedure: COLONOSCOPY;  Surgeon: Polo Abel MD;  Location: [facility] ENDO (4TH FLR);  Service: Endoscopy;  Laterality: N/A;    COLONOSCOPY N/A 7/25/2022    Procedure: COLONOSCOPY;  Surgeon: Polo Abel MD;  Location: [facility] ENDO (4TH FLR);  Service: Endoscopy;  Laterality: N/A;  vaccinated-GT    gastric sleeve  2015    HERNIA REPAIR      hiatal     TUBAL LIGATION         Family History   Problem Relation Name Age of Onset    Hypertension Father      Hypertension Mother      Hypertension Brother      Thyroid disease Other      Anesthesia problems Neg Hx      Breast cancer Neg Hx      Ovarian cancer Neg Hx      Colon cancer Neg Hx         Social History     Socioeconomic History    Marital status:    Tobacco Use    Smoking status: Every Day     Current packs/day: 0.50     Average packs/day: 0.5 packs/day for 30.0 years (15.0 ttl pk-yrs)     Types: Cigarettes     Passive exposure: Never    Smokeless tobacco: Never   Substance and Sexual Activity    Alcohol use: Yes     Comment: social (history of heavy drinking around the time of her divorce)    Drug use: No    Sexual activity: Yes     Partners: Male     Comment:      Social Determinants of Health     Financial Resource Strain: Medium Risk (6/2/2024)    Overall Financial Resource Strain (CARDIA)     Difficulty of Paying Living Expenses: Somewhat hard   Food Insecurity: No Food Insecurity (6/2/2024)    Hunger Vital Sign     Worried About Running Out of Food in the Last Year: Never true     Ran Out of Food in the Last Year: Never true   Physical Activity: Unknown (6/2/2024)    Exercise Vital Sign     Days of Exercise per Week:  5 days   Stress: Stress Concern Present (6/2/2024)    [employer] [place] [employer] - Occupational Stress Questionnaire     Feeling of Stress : Very much   Housing Stability: Unknown (6/2/2024)    Housing Stability Vital Sign     Unable to Pay for Housing in the Last Year: No       Review of Systems   Constitutional:  Negative for appetite change and unexpected weight change.   HENT:  Negative for trouble swallowing.    Respiratory:  Negative for shortness of breath.    Cardiovascular:  Negative for chest pain.   Gastrointestinal:  Negative for abdominal pain and constipation.   Hematological:  Negative for adenopathy. Does not bruise/bleed easily.   Psychiatric/Behavioral:  Negative for dysphoric mood.          Objective:     There were no vitals filed for this visit.       Physical Exam  Constitutional:       General: She is not in acute distress.     Appearance: Normal appearance. She is not ill-appearing.   HENT:      Head: Normocephalic.   Eyes:      Conjunctiva/sclera: Conjunctivae normal.   Pulmonary:      Effort: Pulmonary effort is normal. No respiratory distress.   Skin:     Coloration: Skin is not jaundiced or pale.   Neurological:      Mental Status: She is alert and oriented to person, place, and time.   Psychiatric:         Mood and Affect: Mood normal.         Behavior: Behavior normal.               Assessment:         ICD-10-CM ICD-9-CM   1. Gastroesophageal reflux disease, unspecified whether esophagitis present  K21.9 530.81   2. Chronic constipation  K59.09 564.00       Plan:       Gastroesophageal reflux disease, unspecified whether esophagitis present  - Continue omeprazole 40 mg daily  - Discussed elimination of dietary triggers (fatty foods, caffeine, chocolate, spicy foods, food with high fat content, carbonated beverages, and peppermint.  - Raise the head of your bed by 6 to 8 inches - You can do this by putting blocks of wood or rubber under 2 legs of the bed or a foam wedge  under the mattress.  - Avoid late meals - Lying down with a full stomach can make reflux worse. Try to plan meals for at least 2 to 3 hours before bedtime.  - Avoid tight clothing - Some people feel better if they wear comfortable clothing that does not squeeze the stomach area.   - Proven risks of long term PPI use, including pneumonia, c.diff infection, and bone loss, as well as theoretical risks including dementia and CKD, were discussed with the patient at length and the patient understands risks and benefits of therapy.  Option of tapering/weaning PPI away was also discussed, including the need for possible long term therapy to treat symptoms if they recur after cessation of medication, as well as to mitigate the risk of developing complications of reflux such as Kelley's esophagus and/or esophageal cancer.  Patient understands the risks and benefits of treatment with drug versus cessation.  Daily supplementation with MVI with calcium and vitamin D were recommended as was follow up with PCP for bone scan when appropriate.  Labs including B12, folate, CMP, CBC, calcium, and magnesium should be checked at least annually.    Chronic constipation  - Continue Linzess 145 mcg daily  - Increase dietary fiber intake    Follow up if symptoms worsen or fail to improve.     Patient's Medications   New Prescriptions    No medications on file   Previous Medications    GABAPENTIN (NEURONTIN) 300 MG CAPSULE    TAKE 1 CAPSULE BY MOUTH IN THE EVENING    LINACLOTIDE (LINZESS) 145 MCG CAP CAPSULE    Take 1 capsule (145 mcg total) by mouth once daily.    OMEPRAZOLE (PRILOSEC) 40 MG CAPSULE    Take 1 capsule (40 mg total) by mouth once daily.   Modified Medications    No medications on file   Discontinued Medications    FLUCONAZOLE (DIFLUCAN) 150 MG TAB    Take 1 tablet (150 mg total) by mouth once daily. Repeat in 3 days if symptoms do not improve    PANTOPRAZOLE (PROTONIX) 40 MG TABLET    Take 1 tablet (40 mg total) by mouth  once daily.

## 2025-01-16 NOTE — DISCHARGE INSTRUCTIONS
Discharge Summary/Instructions for after Colonoscopy with Biopsy/Polypectomy    Carmen Gray  2/7/2017  Dallas Mar MD    Restrictions on Activity:    - Do not drive car or operate machinery until the day after the procedure.  - The following day: return to full activity including work.  - For 3 days: No heavy lifting, straining or running.  - Diet: Eat and drink normally unless instructed otherwise.    Treatment for Common Side Effects:  - Mild abdominal pain and bloating or excessive gas: rest, eat lightly and use a heating pad.     Symptoms to watch for and report to your physician:  1. Severe abdominal pain.  2. Fever within 24 hours after a procedure.  3. A large amount of rectal bleeding. (A small amount of blood from the rectum is not serious, especially if hemorrhoids are present.)  4. Because air was put into your colon during the procedure, expelling large amount of air from your rectum is normal.  5. You may not have a bowel movement for 1-3 days because of the colonoscopy prep. This is normal.  6. Do not take any products containing aspirin for 10 days.  7. Go directly to the emergency room if you notice any of the following:     Chills and/or fever over 101   Persistent vomiting   Severe abdominal pain, other than gas cramps   Severe chest pain   Black, tarry stools   Any bleeding - exceeding one tablespoon    If you have any questions or problems, please call your Physician:    Dallas Mar MD      Lab Results: Contact Physician's Office      If a complication or emergency situation arises and you are unable to reach your Physician - GO TO THE EMERGENCY ROOM.       16-Jan-2025 17:34

## 2025-05-06 ENCOUNTER — OFFICE VISIT (OUTPATIENT)
Dept: FAMILY MEDICINE | Facility: CLINIC | Age: 60
End: 2025-05-06
Attending: FAMILY MEDICINE
Payer: COMMERCIAL

## 2025-05-06 VITALS
TEMPERATURE: 98 F | BODY MASS INDEX: 22.27 KG/M2 | HEIGHT: 63 IN | WEIGHT: 125.69 LBS | DIASTOLIC BLOOD PRESSURE: 70 MMHG | HEART RATE: 68 BPM | SYSTOLIC BLOOD PRESSURE: 110 MMHG | OXYGEN SATURATION: 98 %

## 2025-05-06 DIAGNOSIS — F17.210 CIGARETTE NICOTINE DEPENDENCE WITHOUT COMPLICATION: ICD-10-CM

## 2025-05-06 DIAGNOSIS — Z12.31 ENCOUNTER FOR SCREENING MAMMOGRAM FOR BREAST CANCER: ICD-10-CM

## 2025-05-06 DIAGNOSIS — Z00.00 ROUTINE GENERAL MEDICAL EXAMINATION AT HEALTH CARE FACILITY: Primary | ICD-10-CM

## 2025-05-06 DIAGNOSIS — Z72.0 TOBACCO ABUSE: ICD-10-CM

## 2025-05-06 DIAGNOSIS — C18.7 MALIGNANT NEOPLASM OF SIGMOID COLON: ICD-10-CM

## 2025-05-06 DIAGNOSIS — G47.00 INSOMNIA, UNSPECIFIED TYPE: ICD-10-CM

## 2025-05-06 DIAGNOSIS — K21.9 GASTROESOPHAGEAL REFLUX DISEASE, UNSPECIFIED WHETHER ESOPHAGITIS PRESENT: ICD-10-CM

## 2025-05-06 DIAGNOSIS — R53.83 FATIGUE, UNSPECIFIED TYPE: ICD-10-CM

## 2025-05-06 DIAGNOSIS — M81.6 LOCALIZED OSTEOPOROSIS WITHOUT CURRENT PATHOLOGICAL FRACTURE: ICD-10-CM

## 2025-05-06 DIAGNOSIS — Z12.11 SCREEN FOR COLON CANCER: ICD-10-CM

## 2025-05-06 DIAGNOSIS — G25.81 RESTLESS LEG SYNDROME: ICD-10-CM

## 2025-05-06 DIAGNOSIS — K59.09 CHRONIC CONSTIPATION: ICD-10-CM

## 2025-05-06 PROCEDURE — 99999 PR PBB SHADOW E&M-EST. PATIENT-LVL IV: CPT | Mod: PBBFAC,,, | Performed by: FAMILY MEDICINE

## 2025-05-06 RX ORDER — GABAPENTIN 300 MG/1
CAPSULE ORAL
Qty: 90 CAPSULE | Refills: 3 | Status: SHIPPED | OUTPATIENT
Start: 2025-05-06

## 2025-05-06 RX ORDER — OMEPRAZOLE 40 MG/1
40 CAPSULE, DELAYED RELEASE ORAL DAILY
Qty: 90 CAPSULE | Refills: 3 | Status: SHIPPED | OUTPATIENT
Start: 2025-05-06 | End: 2026-05-06

## 2025-05-06 NOTE — PROGRESS NOTES
Subjective:       Patient ID: Brenda Fenner Schumert is a 59 y.o. female.    Chief Complaint: Annual Exam    56 yr old pleasant white female with osteoporosis, GERD,  restless legs syndrome, insomnia, colon cA now in remission, presents today for her annual wellness check, lab work. C/o fatigue. She still smokes and smoked more than 20 years. No s/s of lung CA and want CT scan.    Osteoporosis - not on fosamax due t GERD - want prolia injections instead    GERD - on PPI daily - no issues    Constipation - on linzess daily - no side effects     Insomnia and RLS - on gabapentin once a day - no issues     History as below - reviewed     Medication Refill  Associated symptoms include abdominal pain and fatigue. Pertinent negatives include no arthralgias, chest pain, congestion, diaphoresis, headaches, joint swelling, myalgias, nausea, neck pain, sore throat, vomiting or weakness.   Fatigue  This is a chronic problem. The current episode started more than 1 month ago. The problem occurs constantly. The problem has been unchanged. Associated symptoms include abdominal pain and fatigue. Pertinent negatives include no arthralgias, chest pain, congestion, diaphoresis, headaches, joint swelling, myalgias, nausea, neck pain, sore throat, vomiting or weakness. Nothing aggravates the symptoms. She has tried nothing for the symptoms. The treatment provided no relief.     Review of Systems   Constitutional:  Positive for fatigue. Negative for activity change, diaphoresis and unexpected weight change.   HENT: Negative.  Negative for nasal congestion, ear discharge, hearing loss, rhinorrhea, sore throat, trouble swallowing and voice change.    Eyes: Negative.  Negative for pain, discharge and visual disturbance.   Respiratory: Negative.  Negative for chest tightness, shortness of breath and wheezing.    Cardiovascular: Negative.  Negative for chest pain and palpitations.   Gastrointestinal:  Positive for abdominal pain. Negative for  abdominal distention, anal bleeding, blood in stool, constipation, diarrhea, nausea and vomiting.        Dysphagia   Endocrine: Negative.  Negative for cold intolerance, polydipsia and polyuria.   Genitourinary: Negative.  Negative for decreased urine volume, difficulty urinating, dysuria, frequency, hematuria, menstrual problem and vaginal pain.   Musculoskeletal: Negative.  Negative for arthralgias, gait problem, joint swelling, myalgias and neck pain.   Integumentary:  Negative for color change, pallor and wound. Negative.   Allergic/Immunologic: Negative.  Negative for environmental allergies and immunocompromised state.   Neurological: Negative.  Negative for dizziness, tremors, seizures, speech difficulty, weakness and headaches.   Hematological: Negative.  Negative for adenopathy. Does not bruise/bleed easily.   Psychiatric/Behavioral:  Positive for decreased concentration. Negative for agitation, confusion, dysphoric mood, hallucinations, self-injury and suicidal ideas. The patient is nervous/anxious.      PMH/PSH/FH/SH/MED/ALLERGY reviewed    Past Medical History:   Diagnosis Date    Anxiety     Colon cancer 2016    Depression     Insomnia     Restless leg syndrome        Past Surgical History:   Procedure Laterality Date    CHOLECYSTECTOMY      COLONOSCOPY N/A 2/7/2017    Procedure: COLONOSCOPY  split;  Surgeon: Dallas Mar MD;  Location: [facility];  Service: Endoscopy;  Laterality: N/A;    COLONOSCOPY N/A 2/8/2018    Procedure: COLONOSCOPY;  Surgeon: Polo Abel MD;  Location: [address]);  Service: Endoscopy;  Laterality: N/A;    COLONOSCOPY N/A 5/20/2019    Procedure: COLONOSCOPY;  Surgeon: Polo Abel MD;  Location: [address]);  Service: Endoscopy;  Laterality: N/A;    COLONOSCOPY N/A 7/25/2022    Procedure: COLONOSCOPY;  Surgeon: Polo Abel MD;  Location: [address]);  Service: Endoscopy;  Laterality: N/A;  vaccinated-GT    gastric  percy  2015    HERNIA REPAIR      hiatal     TUBAL LIGATION         Family History   Problem Relation Name Age of Onset    Hypertension Father      Hypertension Mother      Hypertension Brother      Thyroid disease Other      Anesthesia problems Neg Hx      Breast cancer Neg Hx      Ovarian cancer Neg Hx      Colon cancer Neg Hx         Social History     Socioeconomic History    Marital status:    Tobacco Use    Smoking status: Every Day     Current packs/day: 0.50     Average packs/day: 0.5 packs/day for 30.0 years (15.0 ttl pk-yrs)     Types: Cigarettes     Passive exposure: Never    Smokeless tobacco: Never   Substance and Sexual Activity    Alcohol use: Yes     Comment: social (history of heavy drinking around the time of her divorce)    Drug use: No    Sexual activity: Yes     Partners: Male     Comment:      Social Drivers of Health     Financial Resource Strain: Low Risk  (4/30/2025)    Overall Financial Resource Strain (CARDIA)     Difficulty of Paying Living Expenses: Not hard at all   Food Insecurity: No Food Insecurity (4/30/2025)    Hunger Vital Sign     Worried About Running Out of Food in the Last Year: Never true     Ran Out of Food in the Last Year: Never true   Transportation Needs: No Transportation Needs (4/30/2025)    PRAPARE - Transportation     Lack of Transportation (Medical): No     Lack of Transportation (Non-Medical): No   Physical Activity: Sufficiently Active (4/30/2025)    Exercise Vital Sign     Days of Exercise per Week: 3 days     Minutes of Exercise per Session: 60 min   Stress: Stress Concern Present (4/30/2025)    [employer] [place] - Occupational Stress Questionnaire     Feeling of Stress : Rather much   Housing Stability: Low Risk  (4/30/2025)    Housing Stability Vital Sign     Unable to Pay for Housing in the Last Year: No     Homeless in the Last Year: No       Current Outpatient Medications   Medication Sig Dispense Refill    gabapentin  (NEURONTIN) 300 MG capsule TAKE 1 CAPSULE BY MOUTH IN THE EVENING 90 capsule 3    linaCLOtide (LINZESS) 145 mcg Cap capsule Take 1 capsule (145 mcg total) by mouth once daily. 30 capsule 2    omeprazole (PRILOSEC) 40 MG capsule Take 1 capsule (40 mg total) by mouth once daily. 90 capsule 3     No current facility-administered medications for this visit.       Review of patient's allergies indicates:   Allergen Reactions    Codeine Itching    Hydrocodone Itching             Objective:       Vitals:    05/06/25 1335   BP: 110/70   Pulse: 68   Temp: 98 °F (36.7 °C)       Physical Exam  Constitutional:       General: She is not in acute distress.     Appearance: She is well-developed. She is not diaphoretic.   HENT:      Head: Normocephalic and atraumatic.      Right Ear: External ear normal.      Left Ear: External ear normal.      Nose: Nose normal.      Mouth/Throat:      Pharynx: No oropharyngeal exudate.   Eyes:      General: No scleral icterus.        Right eye: No discharge.         Left eye: No discharge.      Conjunctiva/sclera: Conjunctivae normal.      Pupils: Pupils are equal, round, and reactive to light.   Neck:      Thyroid: No thyromegaly.      Vascular: No JVD.      Trachea: No tracheal deviation.   Cardiovascular:      Rate and Rhythm: Normal rate and regular rhythm.      Heart sounds: Normal heart sounds. No murmur heard.     No friction rub. No gallop.   Pulmonary:      Effort: Pulmonary effort is normal.      Breath sounds: Normal breath sounds. No stridor. No wheezing or rales.   Chest:      Chest wall: No tenderness.   Abdominal:      General: Bowel sounds are normal. There is no distension.      Palpations: Abdomen is soft. There is no mass.      Tenderness: There is no abdominal tenderness. There is no guarding or rebound.      Hernia: No hernia is present.   Musculoskeletal:         General: No tenderness. Normal range of motion.      Cervical back: Normal range of motion and neck supple.    Lymphadenopathy:      Cervical: No cervical adenopathy.   Skin:     General: Skin is warm and dry.      Coloration: Skin is not pale.      Findings: No erythema or rash.   Neurological:      Mental Status: She is alert and oriented to person, place, and time.      Cranial Nerves: No cranial nerve deficit.      Motor: No abnormal muscle tone.      Coordination: Coordination normal.      Deep Tendon Reflexes: Reflexes are normal and symmetric. Reflexes normal.   Psychiatric:         Behavior: Behavior normal.         Thought Content: Thought content normal.         Judgment: Judgment normal.         Assessment:       Problem List Items Addressed This Visit       Tobacco abuse    Relevant Orders    CT Chest Lung Screening Low Dose    CBC Auto Differential    Comprehensive Metabolic Panel    Lipid Panel    Hemoglobin A1C    TSH    Vitamin D    Vitamin B12    Iron and TIBC    Ferritin    Restless leg syndrome    Relevant Medications    gabapentin (NEURONTIN) 300 MG capsule    Other Relevant Orders    CBC Auto Differential    Comprehensive Metabolic Panel    Lipid Panel    Hemoglobin A1C    TSH    Vitamin D    Vitamin B12    Iron and TIBC    Ferritin    Malignant neoplasm of sigmoid colon    Relevant Orders    CBC Auto Differential    Comprehensive Metabolic Panel    Lipid Panel    Hemoglobin A1C    TSH    Vitamin D    Vitamin B12    Iron and TIBC    Ferritin    Localized osteoporosis without current pathological fracture    Relevant Orders    CBC Auto Differential    Comprehensive Metabolic Panel    Lipid Panel    Hemoglobin A1C    TSH    Vitamin D    Vitamin B12    Iron and TIBC    Ferritin    Insomnia    Relevant Orders    CBC Auto Differential    Comprehensive Metabolic Panel    Lipid Panel    Hemoglobin A1C    TSH    Vitamin D    Vitamin B12    Iron and TIBC    Ferritin    Gastroesophageal reflux disease    Relevant Medications    omeprazole (PRILOSEC) 40 MG capsule    Other Relevant Orders    Iron and TIBC     Ferritin    Chronic constipation    Relevant Medications    linaCLOtide (LINZESS) 145 mcg Cap capsule    Other Relevant Orders    Iron and TIBC    Ferritin     Other Visit Diagnoses         Routine general medical examination at health care facility    -  Primary    Relevant Orders    CBC Auto Differential    Comprehensive Metabolic Panel    Lipid Panel    Hemoglobin A1C    TSH    Vitamin D    Vitamin B12    Iron and TIBC    Ferritin      Cigarette nicotine dependence without complication        Relevant Orders    CT Chest Lung Screening Low Dose    CBC Auto Differential    Comprehensive Metabolic Panel    Lipid Panel    Hemoglobin A1C    TSH    Vitamin D    Vitamin B12    Iron and TIBC    Ferritin      Fatigue, unspecified type        Relevant Orders    CBC Auto Differential    Comprehensive Metabolic Panel    Lipid Panel    Hemoglobin A1C    TSH    Vitamin D    Vitamin B12    Iron and TIBC    Ferritin      Encounter for screening mammogram for breast cancer        Relevant Orders    Mammo Digital Screening Bilat w/ [facility] (XPD)    Iron and TIBC    Ferritin      Screen for colon cancer        Relevant Orders    Ambulatory referral/consult to Endo Procedure     Iron and TIBC    Ferritin            Plan:           Carmen was seen today for annual exam.    Diagnoses and all orders for this visit:    Routine general medical examination at health care facility  -     CBC Auto Differential; Future  -     Comprehensive Metabolic Panel; Future  -     Lipid Panel; Future  -     Hemoglobin A1C; Future  -     TSH; Future  -     Vitamin D; Future  -     Vitamin B12; Future  -     Iron and TIBC; Future  -     Ferritin; Future    Malignant neoplasm of sigmoid colon  -     CBC Auto Differential; Future  -     Comprehensive Metabolic Panel; Future  -     Lipid Panel; Future  -     Hemoglobin A1C; Future  -     TSH; Future  -     Vitamin D; Future  -     Vitamin B12; Future  -     Iron and TIBC; Future  -     Ferritin;  Future    Localized osteoporosis without current pathological fracture  -     CBC Auto Differential; Future  -     Comprehensive Metabolic Panel; Future  -     Lipid Panel; Future  -     Hemoglobin A1C; Future  -     TSH; Future  -     Vitamin D; Future  -     Vitamin B12; Future  -     Iron and TIBC; Future  -     Ferritin; Future    Restless leg syndrome  -     CBC Auto Differential; Future  -     Comprehensive Metabolic Panel; Future  -     Lipid Panel; Future  -     Hemoglobin A1C; Future  -     TSH; Future  -     Vitamin D; Future  -     Vitamin B12; Future  -     gabapentin (NEURONTIN) 300 MG capsule; TAKE 1 CAPSULE BY MOUTH IN THE EVENING  -     Iron and TIBC; Future  -     Ferritin; Future    Insomnia, unspecified type  -     CBC Auto Differential; Future  -     Comprehensive Metabolic Panel; Future  -     Lipid Panel; Future  -     Hemoglobin A1C; Future  -     TSH; Future  -     Vitamin D; Future  -     Vitamin B12; Future  -     Iron and TIBC; Future  -     Ferritin; Future    Tobacco abuse  -     CT Chest Lung Screening Low Dose; Future  -     CBC Auto Differential; Future  -     Comprehensive Metabolic Panel; Future  -     Lipid Panel; Future  -     Hemoglobin A1C; Future  -     TSH; Future  -     Vitamin D; Future  -     Vitamin B12; Future  -     Iron and TIBC; Future  -     Ferritin; Future    Cigarette nicotine dependence without complication  -     CT Chest Lung Screening Low Dose; Future  -     CBC Auto Differential; Future  -     Comprehensive Metabolic Panel; Future  -     Lipid Panel; Future  -     Hemoglobin A1C; Future  -     TSH; Future  -     Vitamin D; Future  -     Vitamin B12; Future  -     Iron and TIBC; Future  -     Ferritin; Future    Fatigue, unspecified type  -     CBC Auto Differential; Future  -     Comprehensive Metabolic Panel; Future  -     Lipid Panel; Future  -     Hemoglobin A1C; Future  -     TSH; Future  -     Vitamin D; Future  -     Vitamin B12; Future  -     Iron and  TIBC; Future  -     Ferritin; Future    Encounter for screening mammogram for breast cancer  -     Mammo Digital Screening Bilat w/ [facility] (XPD); Future  -     Iron and TIBC; Future  -     Ferritin; Future    Screen for colon cancer  -     Ambulatory referral/consult to Endo Procedure ; Future  -     Iron and TIBC; Future  -     Ferritin; Future    Chronic constipation  -     linaCLOtide (LINZESS) 145 mcg Cap capsule; Take 1 capsule (145 mcg total) by mouth once daily.  -     Iron and TIBC; Future  -     Ferritin; Future    Gastroesophageal reflux disease, unspecified whether esophagitis present  -     omeprazole (PRILOSEC) 40 MG capsule; Take 1 capsule (40 mg total) by mouth once daily.  -     Iron and TIBC; Future  -     Ferritin; Future    Other orders  -     denosumab (PROLIA) injection 60 mg        Wellness check  -normal exam  -labs    Osteoporosis  -prolia 6 monthly    Anxiety/depression/insomnia  -improving  -home remedies    Colon CA  -now in remission    RLS  -controlled    GERD  -stable    Tobacco use  -trying to quit  -CT lung screening     Spent adequate time in obtaining history and explaining differentials      Follow up in about 1 year (around 5/6/2026), or if symptoms worsen or fail to improve.

## 2025-05-08 ENCOUNTER — HOSPITAL ENCOUNTER (OUTPATIENT)
Dept: RADIOLOGY | Facility: HOSPITAL | Age: 60
Discharge: HOME | End: 2025-05-08
Attending: FAMILY MEDICINE
Payer: COMMERCIAL

## 2025-05-08 DIAGNOSIS — Z72.0 TOBACCO ABUSE: ICD-10-CM

## 2025-05-08 DIAGNOSIS — F17.210 CIGARETTE NICOTINE DEPENDENCE WITHOUT COMPLICATION: ICD-10-CM

## 2025-05-08 PROCEDURE — 71271 CT THORAX LUNG CANCER SCR C-: CPT | Mod: TC

## 2025-05-09 ENCOUNTER — PATIENT MESSAGE (OUTPATIENT)
Dept: FAMILY MEDICINE | Facility: CLINIC | Age: 60
End: 2025-05-09
Payer: COMMERCIAL

## 2025-05-09 ENCOUNTER — LAB VISIT (OUTPATIENT)
Dept: LAB | Facility: HOSPITAL | Age: 60
End: 2025-05-09
Attending: FAMILY MEDICINE
Payer: COMMERCIAL

## 2025-05-09 DIAGNOSIS — M81.6 LOCALIZED OSTEOPOROSIS WITHOUT CURRENT PATHOLOGICAL FRACTURE: ICD-10-CM

## 2025-05-09 DIAGNOSIS — C18.7 MALIGNANT NEOPLASM OF SIGMOID COLON: ICD-10-CM

## 2025-05-09 DIAGNOSIS — F17.210 CIGARETTE NICOTINE DEPENDENCE WITHOUT COMPLICATION: ICD-10-CM

## 2025-05-09 DIAGNOSIS — G47.00 INSOMNIA, UNSPECIFIED TYPE: ICD-10-CM

## 2025-05-09 DIAGNOSIS — Z12.11 SCREEN FOR COLON CANCER: ICD-10-CM

## 2025-05-09 DIAGNOSIS — K59.09 CHRONIC CONSTIPATION: ICD-10-CM

## 2025-05-09 DIAGNOSIS — G25.81 RESTLESS LEG SYNDROME: ICD-10-CM

## 2025-05-09 DIAGNOSIS — Z00.00 ROUTINE GENERAL MEDICAL EXAMINATION AT HEALTH CARE FACILITY: ICD-10-CM

## 2025-05-09 DIAGNOSIS — Z72.0 TOBACCO ABUSE: ICD-10-CM

## 2025-05-09 DIAGNOSIS — R53.83 FATIGUE, UNSPECIFIED TYPE: ICD-10-CM

## 2025-05-09 DIAGNOSIS — K21.9 GASTROESOPHAGEAL REFLUX DISEASE, UNSPECIFIED WHETHER ESOPHAGITIS PRESENT: ICD-10-CM

## 2025-05-09 DIAGNOSIS — Z12.31 ENCOUNTER FOR SCREENING MAMMOGRAM FOR BREAST CANCER: ICD-10-CM

## 2025-05-09 LAB
25(OH)D3+25(OH)D2 SERPL-MCNC: 57 NG/ML (ref 30–96)
ABSOLUTE EOSINOPHIL (OHS): 0.14 K/UL
ABSOLUTE MONOCYTE (OHS): 0.44 K/UL (ref 0.3–1)
ABSOLUTE NEUTROPHIL COUNT (OHS): 2.7 K/UL (ref 1.8–7.7)
ALBUMIN SERPL BCP-MCNC: 3.9 G/DL (ref 3.5–5.2)
ALP SERPL-CCNC: 65 UNIT/L (ref 40–150)
ALT SERPL W/O P-5'-P-CCNC: 11 UNIT/L (ref 10–44)
ANION GAP (OHS): 8 MMOL/L (ref 8–16)
AST SERPL-CCNC: 19 UNIT/L (ref 11–45)
BASOPHILS # BLD AUTO: 0.02 K/UL
BASOPHILS NFR BLD AUTO: 0.3 %
BILIRUB SERPL-MCNC: 0.7 MG/DL (ref 0.1–1)
BUN SERPL-MCNC: 14 MG/DL (ref 6–20)
CALCIUM SERPL-MCNC: 9.7 MG/DL (ref 8.7–10.5)
CHLORIDE SERPL-SCNC: 106 MMOL/L (ref 95–110)
CHOLEST SERPL-MCNC: 179 MG/DL (ref 120–199)
CHOLEST/HDLC SERPL: 2.7 {RATIO} (ref 2–5)
CO2 SERPL-SCNC: 26 MMOL/L (ref 23–29)
CREAT SERPL-MCNC: 0.7 MG/DL (ref 0.5–1.4)
EAG (OHS): 105 MG/DL (ref 68–131)
ERYTHROCYTE [DISTWIDTH] IN BLOOD BY AUTOMATED COUNT: 13.1 % (ref 11.5–14.5)
FERRITIN SERPL-MCNC: 72.5 NG/ML (ref 20–300)
GFR SERPLBLD CREATININE-BSD FMLA CKD-EPI: >60 ML/MIN/1.73/M2
GLUCOSE SERPL-MCNC: 82 MG/DL (ref 70–110)
HBA1C MFR BLD: 5.3 % (ref 4–5.6)
HCT VFR BLD AUTO: 41.7 % (ref 37–48.5)
HDLC SERPL-MCNC: 67 MG/DL (ref 40–75)
HDLC SERPL: 37.4 % (ref 20–50)
HGB BLD-MCNC: 13.7 GM/DL (ref 12–16)
IMM GRANULOCYTES # BLD AUTO: 0.01 K/UL (ref 0–0.04)
IMM GRANULOCYTES NFR BLD AUTO: 0.2 % (ref 0–0.5)
IRON SATN MFR SERPL: 58 % (ref 20–50)
IRON SERPL-MCNC: 200 UG/DL (ref 30–160)
LDLC SERPL CALC-MCNC: 97.2 MG/DL (ref 63–159)
LYMPHOCYTES # BLD AUTO: 2.94 K/UL (ref 1–4.8)
MCH RBC QN AUTO: 31.1 PG (ref 27–31)
MCHC RBC AUTO-ENTMCNC: 32.9 G/DL (ref 32–36)
MCV RBC AUTO: 95 FL (ref 82–98)
NONHDLC SERPL-MCNC: 112 MG/DL
NUCLEATED RBC (/100WBC) (OHS): 0 /100 WBC
PLATELET # BLD AUTO: 217 K/UL (ref 150–450)
PMV BLD AUTO: 10.5 FL (ref 9.2–12.9)
POTASSIUM SERPL-SCNC: 4.5 MMOL/L (ref 3.5–5.1)
PROT SERPL-MCNC: 8 GM/DL (ref 6–8.4)
RBC # BLD AUTO: 4.41 M/UL (ref 4–5.4)
RELATIVE EOSINOPHIL (OHS): 2.2 %
RELATIVE LYMPHOCYTE (OHS): 47 % (ref 18–48)
RELATIVE MONOCYTE (OHS): 7 % (ref 4–15)
RELATIVE NEUTROPHIL (OHS): 43.3 % (ref 38–73)
SODIUM SERPL-SCNC: 140 MMOL/L (ref 136–145)
TIBC SERPL-MCNC: 345 UG/DL (ref 250–450)
TRANSFERRIN SERPL-MCNC: 233 MG/DL (ref 200–375)
TRIGL SERPL-MCNC: 74 MG/DL (ref 30–150)
TSH SERPL-ACNC: 0.95 UIU/ML (ref 0.4–4)
VIT B12 SERPL-MCNC: >2000 PG/ML (ref 210–950)
WBC # BLD AUTO: 6.25 K/UL (ref 3.9–12.7)

## 2025-05-09 PROCEDURE — 82728 ASSAY OF FERRITIN: CPT

## 2025-05-09 PROCEDURE — 82306 VITAMIN D 25 HYDROXY: CPT

## 2025-05-09 PROCEDURE — 82607 VITAMIN B-12: CPT

## 2025-05-09 PROCEDURE — 83540 ASSAY OF IRON: CPT

## 2025-05-09 PROCEDURE — 85025 COMPLETE CBC W/AUTO DIFF WBC: CPT

## 2025-05-09 PROCEDURE — 84443 ASSAY THYROID STIM HORMONE: CPT

## 2025-05-09 PROCEDURE — 36415 COLL VENOUS BLD VENIPUNCTURE: CPT

## 2025-05-09 PROCEDURE — 80053 COMPREHEN METABOLIC PANEL: CPT

## 2025-05-09 PROCEDURE — 83036 HEMOGLOBIN GLYCOSYLATED A1C: CPT

## 2025-05-09 PROCEDURE — 80061 LIPID PANEL: CPT

## 2025-05-12 ENCOUNTER — TELEPHONE (OUTPATIENT)
Dept: ENDOSCOPY | Facility: HOSPITAL | Age: 60
End: 2025-05-12

## 2025-05-12 ENCOUNTER — CLINICAL SUPPORT (OUTPATIENT)
Dept: ENDOSCOPY | Facility: HOSPITAL | Age: 60
End: 2025-05-12
Attending: FAMILY MEDICINE
Payer: COMMERCIAL

## 2025-05-12 VITALS — HEIGHT: 63 IN | BODY MASS INDEX: 22.86 KG/M2 | WEIGHT: 129 LBS

## 2025-05-12 DIAGNOSIS — Z85.038 HISTORY OF COLON CANCER: ICD-10-CM

## 2025-05-12 DIAGNOSIS — Z12.11 SCREEN FOR COLON CANCER: ICD-10-CM

## 2025-05-12 DIAGNOSIS — Z12.11 SCREENING FOR COLON CANCER: Primary | ICD-10-CM

## 2025-05-12 RX ORDER — SODIUM, POTASSIUM,MAG SULFATES 17.5-3.13G
1 SOLUTION, RECONSTITUTED, ORAL ORAL DAILY
Qty: 1 KIT | Refills: 0 | Status: SHIPPED | OUTPATIENT
Start: 2025-05-12 | End: 2025-05-14

## 2025-05-12 NOTE — PLAN OF CARE
.Patient is scheduled for a Colonoscopy on 5/19/2025 with Dr. FEDERICO Abel  Referral for procedure from PAT appointment

## 2025-05-12 NOTE — TELEPHONE ENCOUNTER
.Patient is scheduled for a Colonoscopy on 5/19/2025 with Dr. FEDERICO Abel  Referral for procedure from PAT appointment.  Pt advised to take extended prep secondary to constipation issues. Pt verbalized understanding of risks of having a poor prep without the extended Prep. Pt would like to continue with Suprep.

## 2025-05-19 ENCOUNTER — ANESTHESIA (OUTPATIENT)
Dept: ENDOSCOPY | Facility: HOSPITAL | Age: 60
End: 2025-05-19
Payer: COMMERCIAL

## 2025-05-19 ENCOUNTER — ANESTHESIA EVENT (OUTPATIENT)
Dept: ENDOSCOPY | Facility: HOSPITAL | Age: 60
End: 2025-05-19
Payer: COMMERCIAL

## 2025-05-19 ENCOUNTER — HOSPITAL ENCOUNTER (OUTPATIENT)
Facility: HOSPITAL | Age: 60
Discharge: HOME | End: 2025-05-19
Attending: COLON & RECTAL SURGERY | Admitting: COLON & RECTAL SURGERY
Payer: COMMERCIAL

## 2025-05-19 VITALS
SYSTOLIC BLOOD PRESSURE: 125 MMHG | HEART RATE: 73 BPM | WEIGHT: 122.38 LBS | HEIGHT: 62 IN | OXYGEN SATURATION: 99 % | DIASTOLIC BLOOD PRESSURE: 67 MMHG | TEMPERATURE: 98 F | RESPIRATION RATE: 16 BRPM | BODY MASS INDEX: 22.52 KG/M2

## 2025-05-19 DIAGNOSIS — Z85.038 HISTORY OF COLON CANCER: ICD-10-CM

## 2025-05-19 DIAGNOSIS — Z85.038 HISTORY OF COLON CANCER, STAGE I: Primary | ICD-10-CM

## 2025-05-19 PROCEDURE — 63600175 PHARM REV CODE 636 W HCPCS

## 2025-05-19 PROCEDURE — 37000009 HC ANESTHESIA EA ADD 15 MINS: Performed by: COLON & RECTAL SURGERY

## 2025-05-19 PROCEDURE — 25000003 PHARM REV CODE 250: Performed by: COLON & RECTAL SURGERY

## 2025-05-19 PROCEDURE — 37000008 HC ANESTHESIA 1ST 15 MINUTES: Performed by: COLON & RECTAL SURGERY

## 2025-05-19 PROCEDURE — G0105 COLORECTAL SCRN; HI RISK IND: HCPCS | Mod: ,,, | Performed by: COLON & RECTAL SURGERY

## 2025-05-19 PROCEDURE — G0105 COLORECTAL SCRN; HI RISK IND: HCPCS | Performed by: COLON & RECTAL SURGERY

## 2025-05-19 RX ORDER — LIDOCAINE HYDROCHLORIDE 20 MG/ML
INJECTION, SOLUTION EPIDURAL; INFILTRATION; INTRACAUDAL; PERINEURAL
Status: DISCONTINUED | OUTPATIENT
Start: 2025-05-19 | End: 2025-05-19

## 2025-05-19 RX ORDER — PROPOFOL 10 MG/ML
VIAL (ML) INTRAVENOUS
Status: DISCONTINUED | OUTPATIENT
Start: 2025-05-19 | End: 2025-05-19

## 2025-05-19 RX ORDER — SODIUM CHLORIDE 9 MG/ML
INJECTION, SOLUTION INTRAVENOUS CONTINUOUS
Status: DISCONTINUED | OUTPATIENT
Start: 2025-05-19 | End: 2025-05-19 | Stop reason: HOSPADM

## 2025-05-19 RX ADMIN — PROPOFOL 175 MCG/KG/MIN: 10 INJECTION, EMULSION INTRAVENOUS at 12:05

## 2025-05-19 RX ADMIN — LIDOCAINE HYDROCHLORIDE 50 MG: 20 INJECTION, SOLUTION EPIDURAL; INFILTRATION; INTRACAUDAL; PERINEURAL at 12:05

## 2025-05-19 RX ADMIN — SODIUM CHLORIDE: 0.9 INJECTION, SOLUTION INTRAVENOUS at 12:05

## 2025-05-19 RX ADMIN — PROPOFOL 70 MG: 10 INJECTION, EMULSION INTRAVENOUS at 12:05

## 2025-05-19 NOTE — [employer] PATIENT INSTRUCTIONS
Discharge Summary/Instructions after an Endoscopic Procedure  Patient Name: Brenda Schumert  Patient MRN: [id number]  Patient YOB: 1965  Monday, May 19, 2025  Polo Abel MD  Dear patient,  As a result of recent federal legislation (The Federal Cures Act), you may   receive lab or pathology results from your procedure in your [facility]   account before your physician is able to contact you. Your physician or   their representative will relay the results to you with their   recommendations at their soonest availability.  Thank you,  RESTRICTIONS:  During your procedure today, you received medications for sedation.  These   medications may affect your judgment, balance and coordination.  Therefore,   for 24 hours, you have the following restrictions:   - DO NOT drive a car, operate machinery, make legal/financial decisions,   sign important papers or drink alcohol.    ACTIVITY:  Today: no heavy lifting, straining or running due to procedural   sedation/anesthesia.  The following day: return to full activity including work.  DIET:  Eat and drink normally unless instructed otherwise.     TREATMENT FOR COMMON SIDE EFFECTS:  - Mild abdominal pain, nausea, belching, bloating or excessive gas:  rest,   eat lightly and use a heating pad.  - Sore Throat: treat with throat lozenges and/or gargle with warm salt   water.  - Because air was used during the procedure, expelling large amounts of air   from your rectum or belching is normal.  - If a bowel prep was taken, you may not have a bowel movement for 1-3 days.    This is normal.  SYMPTOMS TO WATCH FOR AND REPORT TO YOUR PHYSICIAN:  1. Abdominal pain or bloating, other than gas cramps.  2. Chest pain.  3. Back pain.  4. Signs of infection such as: chills or fever occurring within 24 hours   after the procedure.  5. Rectal bleeding, which would show as bright red, maroon, or black stools.   (A tablespoon of blood from the rectum is not serious, especially if    hemorrhoids are present.)  6. Vomiting.  7. Weakness or dizziness.  GO DIRECTLY TO THE NEAREST EMERGENCY ROOM IF YOU HAVE ANY OF THE FOLLOWING:      Difficulty breathing              Chills and/or fever over 101 F   Persistent vomiting and/or vomiting blood   Severe abdominal pain   Severe chest pain   Black, tarry stools   Bleeding- more than one tablespoon   Any other symptom or condition that you feel may need urgent attention  Your doctor recommends these additional instructions:  If any biopsies were taken, your doctors clinic will contact you in 1 to 2   weeks with any results.  - Discharge patient to home (ambulatory).   - Patient has a contact number available for emergencies.  The signs and   symptoms of potential delayed complications were discussed with the   patient.  Return to normal activities tomorrow.  Written discharge   instructions were provided to the patient.   - Resume previous diet.   - Continue present medications.   - Return to primary care physician as previously scheduled.   - Repeat colonoscopy in 5 years for surveillance.  For questions, problems or results please call your physician - Polo Abel MD at Work:  [phone].  [facility] [place], EMERGENCY ROOM PHONE NUMBER: [phone]  IF A COMPLICATION OR EMERGENCY SITUATION ARISES AND YOU ARE UNABLE TO REACH   YOUR PHYSICIAN - GO DIRECTLY TO THE EMERGENCY ROOM.  Polo Abel MD  5/19/2025 12:28:11 PM  This report has been verified and signed electronically.  Dear patient,  As a result of recent federal legislation (The Federal Cures Act), you may   receive lab or pathology results from your procedure in your [facility]   account before your physician is able to contact you. Your physician or   their representative will relay the results to you with their   recommendations at their soonest availability.  Thank you,  PROVATION

## 2025-05-19 NOTE — ANESTHESIA PREPROCEDURE EVALUATION
[facility]  Anesthesia Pre-Operative Evaluation     Patient Name: Brenda Fenner Schumert  YOB: 1965  MRN: [id number]  [facility]: [id number]       Admit Date: 5/19/2025   Admit Team: Networked reference to record PCT   Hospital Day: 1  Date of Procedure: 5/19/2025  Anesthesia: Choice Procedure: Procedure(s) (LRB):  COLONOSCOPY, SCREENING, HIGH RISK PATIENT (N/A)  Pre-Operative Diagnosis: Screening for colon cancer [Z12.11]  History of colon cancer [Z85.038]  Proceduralist:Surgeons and Role:     * Polo Abel MD - Primary  Code Status: No Order   Advanced Directive: Received  Isolation Precautions: No active isolations  Capacity: Full capacity     SUBJECTIVE:   Brenda Fenner Schumert is a 59 y.o. female who  has a past medical history of Anxiety, Colon cancer (2016), Depression, Insomnia, and Restless leg syndrome.  No notes on file   0.9% NaCl   Intravenous Continuous         Hospital LOS: 0 days  ICU LOS: Patient does not have an ICU stay during this admission.    she has a current medication list which includes the following long-term medication(s): gabapentin and omeprazole.   Current Outpatient Medications   Medication Instructions    gabapentin (NEURONTIN) 300 MG capsule TAKE 1 CAPSULE BY MOUTH IN THE EVENING    linaCLOtide (LINZESS) 145 mcg, Oral, Daily    omeprazole (PRILOSEC) 40 mg, Oral, Daily     ALLERGIES:     Review of patient's allergies indicates:   Allergen Reactions    Codeine Itching    Hydrocodone Itching     LDA:   AIRWAY:         [unfilled]     Lines/Drains/Airways       Peripheral Intravenous Line  Duration                  Peripheral IV - Single Lumen 05/19/25 1122 22 G Anterior;Right Hand <1 day                   Anesthesia Evaluation      Airway   Mallampati: II  TM distance: Normal  Neck ROM: Normal ROM  Dental    (+) Intact    Pulmonary    Cardiovascular     Neuro/Psych    (+) psychiatric history    GI/Hepatic/Renal    (+) GERD well controlled     "Endo/Other    Abdominal                      Current Outpatient Medications on File Prior to Encounter   Medication Sig Dispense Refill Last Dose/Taking    gabapentin (NEURONTIN) 300 MG capsule TAKE 1 CAPSULE BY MOUTH IN THE EVENING 90 capsule 3 Past Week    linaCLOtide (LINZESS) 145 mcg Cap capsule Take 1 capsule (145 mcg total) by mouth once daily. 30 capsule 2 Past Week    omeprazole (PRILOSEC) 40 MG capsule Take 1 capsule (40 mg total) by mouth once daily. 90 capsule 3 Past Week      Inpatient Medications:  Antibiotics (From admission, onward)      None          VTE Risk Mitigation (From admission, onward)      None              Current Medications[1]       History:   There are no hospital problems to display for this patient.    Surgical History:    has a past surgical history that includes Tubal ligation; gastric sleeve (2015); Cholecystectomy; Hernia repair; Colonoscopy (N/A, 2/7/2017); Colonoscopy (N/A, 2/8/2018); Colonoscopy (N/A, 5/20/2019); and Colonoscopy (N/A, 7/25/2022).   Social History:    reports being sexually active and has had partner(s) who are male.  reports that she has been smoking cigarettes. She has a 15 pack-year smoking history. She has never been exposed to tobacco smoke. She has never used smokeless tobacco. She reports current alcohol use. She reports that she does not use drugs.    Vitals:    05/19/25 1119   BP: 120/75   Pulse: 69   Resp: 15   Temp: 36.8 °C (98.2 °F)   SpO2: 97%   Weight: 55.5 kg (122 lb 5.7 oz)   Height: 5' 2" (1.575 m)     Vital Signs Range (Last 24H):  Temp:  [36.8 °C (98.2 °F)]   Pulse:  [69]   Resp:  [15]   BP: (120)/(75)   SpO2:  [97 %]     Body mass index is 22.38 kg/m².  Wt Readings from Last 4 Encounters:   05/19/25 55.5 kg (122 lb 5.7 oz)   05/12/25 58.5 kg (129 lb)   05/06/25 57 kg (125 lb 10.6 oz)   06/07/24 55.8 kg (123 lb 0.3 oz)        Intake/Output - Last 3 Shifts       None          Lab Results   Component Value Date    WBC 6.25 05/09/2025    HGB 13.7 " "05/09/2025    HCT 41.7 05/09/2025     05/09/2025     05/09/2025    K 4.5 05/09/2025     05/09/2025    CREATININE 0.7 05/09/2025    BUN 14 05/09/2025    CO2 26 05/09/2025    GLU 82 05/09/2025    CALCIUM 9.7 05/09/2025    MG 2.1 05/07/2018    PHOS 4.3 05/07/2018    ALKPHOS 65 05/09/2025    ALT 11 05/09/2025    AST 19 05/09/2025    ALBUMIN 3.9 05/09/2025    HGBA1C 5.3 05/09/2025     No results found for this or any previous visit (from the past 12 hours).  No results for input(s): "WBC", "HGB", "HCT", "PLT", "NA", "K", "CREATININE", "GLU", "INR", "LACTATE", "5HIAAPLASMA", "5HIAAURINT", "5HIAA", "[id number]" in the last 168 hours.  No LMP recorded (lmp unknown). Patient is postmenopausal.    EKG:     TTE:  No results found for this or any previous visit.    DA:  No results found for this or any previous visit.    Stress Test:  No results found for this or any previous visit.    No results found for this or any previous visit.    LHC:  No results found for this or any previous visit.    Cardiac Device Check   No results found for this or any previous visit.    No results found for this or any previous visit.                                                                                                               05/19/2025  Brenda Fenner Schumert is a 59 y.o., female.      Pre-op Assessment    I have reviewed the Patient Summary Reports.       I have reviewed the Medications.     Review of Systems  Anesthesia Hx:  No problems with previous Anesthesia   History of prior surgery of interest to airway management or planning:  Previous anesthesia: General           Social:  Smoker, Social Alcohol Use       Hematology/Oncology:                                  Oncology Comments: CA of sigmoid colon     Hepatic/GI:     GERD, well controlled   Esophageal dysmotility             Psych:  Psychiatric History anxiety depression                Physical Exam  General: Well nourished, Cooperative, Alert and " Oriented    Airway:  Mallampati: II   Mouth Opening: Normal  TM Distance: Normal  Tongue: Normal  Neck ROM: Normal ROM    Dental:  Intact      Anesthesia Plan  Type of Anesthesia, risks & benefits discussed:    Anesthesia Type: Gen Natural Airway  Intra-op Monitoring Plan: Standard ASA Monitors  Post Op Pain Control Plan: multimodal analgesia and IV/PO Opioids PRN  Induction:  IV  Informed Consent: Informed consent signed with the Patient and all parties understand the risks and agree with anesthesia plan.  All questions answered.   ASA Score: 3    Ready For Surgery From Anesthesia Perspective.     .           [1]   Current Facility-Administered Medications   Medication Dose Route Frequency Provider Last Rate Last Admin    0.9% NaCl infusion   Intravenous Continuous Polo Abel MD

## 2025-05-19 NOTE — H&P
"    COLONOSCOPY HISTORY AND PHYSICAL EXAM    Procedure : Colonoscopy      INDICATIONS: personal history of colon cancer    Family Hx of CRC: none    Last Colonoscopy:  2022  Findings: normal       Past Medical History:   Diagnosis Date    Anxiety     Colon cancer 2016    Depression     Insomnia     Restless leg syndrome      Sedation Problems: NO  Family History   Problem Relation Name Age of Onset    Hypertension Father      Hypertension Mother      Hypertension Brother      Thyroid disease Other      Anesthesia problems Neg Hx      Breast cancer Neg Hx      Ovarian cancer Neg Hx      Colon cancer Neg Hx       Fam Hx of Sedation Problems: NO  Social History[1]    Review of Systems - Negative except   Respiratory ROS: no dyspnea  Cardiovascular ROS: no exertional chest pain  Gastrointestinal ROS: NO abdominal discomfort,  NO rectal bleeding  Musculoskeletal ROS: no muscular pain  Neurological ROS: no recent stroke    Physical Exam:  /75   Pulse 69   Temp 98.2 °F (36.8 °C)   Resp 15   Ht 5' 2" (1.575 m)   Wt 55.5 kg (122 lb 5.7 oz)   LMP  (LMP Unknown)   SpO2 97%   Breastfeeding No   BMI 22.38 kg/m²   General: no distress  Head: normocephalic  Mallampati Score   Neck: supple, symmetrical, trachea midline  Lungs:  normal respiratory effort  Heart: regular rate and rhythm  Abdomen: soft, non-tender non-distented; bowel sounds normal; no masses,  no organomegaly  Extremities: no cyanosis or edema, or clubbing    ASA:  II    PLAN  COLONOSCOPY.    SedationPlan :MAC    The details of the procedure, the possible need for biopsy or polypectomy and the potential risks including bleeding, perforation, missed polyps were discussed in detail.           [1]   Social History  Socioeconomic History    Marital status:    Tobacco Use    Smoking status: Every Day     Current packs/day: 0.50     Average packs/day: 0.5 packs/day for 30.0 years (15.0 ttl pk-yrs)     Types: Cigarettes     Passive exposure: Never    " Smokeless tobacco: Never   Substance and Sexual Activity    Alcohol use: Yes     Comment: social (history of heavy drinking around the time of her divorce)    Drug use: No    Sexual activity: Yes     Partners: Male     Comment:      Social Drivers of Health     Financial Resource Strain: Low Risk  (4/30/2025)    Overall Financial Resource Strain (CARDIA)     Difficulty of Paying Living Expenses: Not hard at all   Food Insecurity: No Food Insecurity (4/30/2025)    Hunger Vital Sign     Worried About Running Out of Food in the Last Year: Never true     Ran Out of Food in the Last Year: Never true   Transportation Needs: No Transportation Needs (4/30/2025)    PRAPARE - Transportation     Lack of Transportation (Medical): No     Lack of Transportation (Non-Medical): No   Physical Activity: Sufficiently Active (4/30/2025)    Exercise Vital Sign     Days of Exercise per Week: 3 days     Minutes of Exercise per Session: 60 min   Stress: Stress Concern Present (4/30/2025)    [employer] [place] [employer] - Occupational Stress Questionnaire     Feeling of Stress : Rather much   Housing Stability: Low Risk  (4/30/2025)    Housing Stability Vital Sign     Unable to Pay for Housing in the Last Year: No     Homeless in the Last Year: No

## 2025-05-19 NOTE — TRANSFER OF CARE
"Anesthesia Transfer of Care Note    Patient: Brenda Fenner Schumert    Procedure(s) Performed: Procedure(s) (LRB):  COLONOSCOPY, SCREENING, HIGH RISK PATIENT (N/A)    Patient location: PACU    Anesthesia Type: general    Transport from OR: Transported from OR on room air with adequate spontaneous ventilation    Post pain: adequate analgesia    Post assessment: no apparent anesthetic complications and tolerated procedure well    Post vital signs: stable    Level of consciousness: awake    Nausea/Vomiting: no nausea/vomiting    Complications: none    Transfer of care protocol was followed      Last vitals: Visit Vitals  /75   Pulse 69   Temp 36.8 °C (98.2 °F)   Resp 15   Ht 5' 2" (1.575 m)   Wt 55.5 kg (122 lb 5.7 oz)   LMP  (LMP Unknown)   SpO2 97%   Breastfeeding No   BMI 22.38 kg/m²     "

## 2025-05-20 NOTE — ANESTHESIA POSTPROCEDURE EVALUATION
Anesthesia Post Evaluation    Patient: Brenda Fenner Schumert    Procedure(s) Performed: Procedure(s) (LRB):  COLONOSCOPY, SCREENING, HIGH RISK PATIENT (N/A)    Final Anesthesia Type: general      Patient location during evaluation: PACU  Patient participation: Yes- Able to Participate  Level of consciousness: awake and alert  Post-procedure vital signs: reviewed and stable  Pain management: adequate  Airway patency: patent    PONV status at discharge: No PONV  Anesthetic complications: no      Cardiovascular status: blood pressure returned to baseline  Respiratory status: unassisted  Hydration status: euvolemic  Follow-up not needed.              Vitals Value Taken Time   /67 05/19/25 13:03   Temp 36.4 °C (97.5 °F) 05/19/25 12:33   Pulse 73 05/19/25 13:03   Resp 16 05/19/25 13:03   SpO2 99 % 05/19/25 13:03         Event Time   Out of Recovery 13:06:42         Pain/Rafal Score: Rafal Score: 9 (5/19/2025 12:34 PM)

## (undated) DEVICE — SUT 3-0 VICRYL SH CR/8 18

## (undated) DEVICE — KIT ANTIFOG

## (undated) DEVICE — SEE L#95700

## (undated) DEVICE — SUT VICRYL PLUS 0 CT1 18IN

## (undated) DEVICE — SUT CTD VICRYL VIL BR UR-6

## (undated) DEVICE — SUT CTD VICRYL BR CR/SH VIL

## (undated) DEVICE — SET IRR URLGY 2LINE UNIV SPIKE

## (undated) DEVICE — TRAY CATH UM FOLEY SIL W 16FR

## (undated) DEVICE — STAPLER ECHELON ARTC 60MM 28CM

## (undated) DEVICE — SYR 30CC LUER LOCK

## (undated) DEVICE — TROCAR ENDOPATH XCEL 5MM 7.5CM

## (undated) DEVICE — CLIPPER BLADE MOD 4406 (CAREF)

## (undated) DEVICE — GOWN SMART IMP BREATHABLE XXLG

## (undated) DEVICE — SUT 3/0 27IN PDS II VIO MO

## (undated) DEVICE — SUT CTD VICRYL VIL BR SH 27

## (undated) DEVICE — SUT 4/0 36IN ETHIBOND EXCE

## (undated) DEVICE — RELOAD ECHELON FLEX BLU 60MM

## (undated) DEVICE — Device

## (undated) DEVICE — SCISSOR 5MMX35CM DIRECT DRIVE

## (undated) DEVICE — SEE MEDLINE ITEM 152487

## (undated) DEVICE — SEE MEDLINE ITEM 146417

## (undated) DEVICE — SEE MEDLINE ITEM 157181

## (undated) DEVICE — STAPLER CIRCULAR XL SEAL 29MM

## (undated) DEVICE — SEALER G2 ARTC TISS ENSEAL

## (undated) DEVICE — TAPE SURG DURAPORE 2 SGL USE

## (undated) DEVICE — RELOAD ECHELON FLEX GRN 60MM

## (undated) DEVICE — NDL HYPO A BEVEL 22X1 1/2

## (undated) DEVICE — SEE MEDLINE ITEM 157144

## (undated) DEVICE — TROCAR ENDOPATH XCEL 12X100MM

## (undated) DEVICE — SEE MEDLINE ITEM 156902

## (undated) DEVICE — SUT MCRYL PLUS 4-0 PS2 27IN

## (undated) DEVICE — SUT CTD VICRYL 2-0 UND BR

## (undated) DEVICE — STAPLER SKIN PROXIMATE WIDE

## (undated) DEVICE — COVER LIGHT HANDLE 80/CA

## (undated) DEVICE — SUT 1 36IN PDS II VIO MONO

## (undated) DEVICE — CONNECTOR Y 3/8X3/8X3/8

## (undated) DEVICE — TUBING HF INSUFFLATION W/ FLTR

## (undated) DEVICE — SOL NS 1000CC

## (undated) DEVICE — TROCAR KII BLLN 12MM 10CM

## (undated) DEVICE — LUBRICANT SURGILUBE 2 OZ

## (undated) DEVICE — SOL IRR NACL .9% 3000ML

## (undated) DEVICE — NDL BOX COUNTER

## (undated) DEVICE — DRESSING ABSRBNT ISLAND 3.6X8

## (undated) DEVICE — DRAPE ABDOMINAL TIBURON 14X11

## (undated) DEVICE — LEGGINGS 48X31 INCH

## (undated) DEVICE — SUT 1 48IN PDS II VIO MONO

## (undated) DEVICE — ELECTRODE REM PLYHSV RETURN 9

## (undated) DEVICE — ADHESIVE DERMABOND ADVANCED

## (undated) DEVICE — SEE MEDLINE ITEM 146347

## (undated) DEVICE — SYR ONLY LUER LOCK 20CC